# Patient Record
Sex: MALE | Race: WHITE | NOT HISPANIC OR LATINO
[De-identification: names, ages, dates, MRNs, and addresses within clinical notes are randomized per-mention and may not be internally consistent; named-entity substitution may affect disease eponyms.]

---

## 2017-01-31 ENCOUNTER — MEDICATION RENEWAL (OUTPATIENT)
Age: 82
End: 2017-01-31

## 2017-03-27 ENCOUNTER — INPATIENT (INPATIENT)
Facility: HOSPITAL | Age: 82
LOS: 2 days | Discharge: HOME | End: 2017-03-30

## 2017-03-31 ENCOUNTER — APPOINTMENT (OUTPATIENT)
Dept: CARDIOLOGY | Facility: CLINIC | Age: 82
End: 2017-03-31

## 2017-04-03 ENCOUNTER — APPOINTMENT (OUTPATIENT)
Dept: CARDIOLOGY | Facility: CLINIC | Age: 82
End: 2017-04-03

## 2017-04-03 VITALS — RESPIRATION RATE: 18 BRPM | HEART RATE: 56 BPM | SYSTOLIC BLOOD PRESSURE: 110 MMHG | DIASTOLIC BLOOD PRESSURE: 80 MMHG

## 2017-04-03 VITALS — HEIGHT: 67 IN | BODY MASS INDEX: 35 KG/M2 | WEIGHT: 223 LBS

## 2017-04-03 RX ORDER — BLOOD SUGAR DIAGNOSTIC
STRIP MISCELLANEOUS
Qty: 200 | Refills: 0 | Status: ACTIVE | COMMUNITY
Start: 2016-05-16

## 2017-04-03 RX ORDER — OXYBUTYNIN CHLORIDE 10 MG/1
10 TABLET, EXTENDED RELEASE ORAL
Qty: 30 | Refills: 0 | Status: ACTIVE | COMMUNITY
Start: 2016-12-27

## 2017-04-03 RX ORDER — PIOGLITAZONE HYDROCHLORIDE 45 MG/1
45 TABLET ORAL
Qty: 30 | Refills: 0 | Status: ACTIVE | COMMUNITY
Start: 2016-12-27

## 2017-04-03 RX ORDER — OMEPRAZOLE 20 MG/1
20 CAPSULE, DELAYED RELEASE ORAL
Qty: 180 | Refills: 0 | Status: ACTIVE | COMMUNITY
Start: 2016-12-05

## 2017-04-03 RX ORDER — GLIPIZIDE 10 MG/1
10 TABLET ORAL
Qty: 360 | Refills: 0 | Status: ACTIVE | COMMUNITY
Start: 2016-10-17

## 2017-04-03 RX ORDER — MEMANTINE HYDROCHLORIDE 10 MG/1
10 TABLET, FILM COATED ORAL
Refills: 0 | Status: ACTIVE | COMMUNITY

## 2017-06-27 DIAGNOSIS — I95.9 HYPOTENSION, UNSPECIFIED: ICD-10-CM

## 2017-06-27 DIAGNOSIS — B95.61 METHICILLIN SUSCEPTIBLE STAPHYLOCOCCUS AUREUS INFECTION AS THE CAUSE OF DISEASES CLASSIFIED ELSEWHERE: ICD-10-CM

## 2017-06-27 DIAGNOSIS — B96.5 PSEUDOMONAS (AERUGINOSA) (MALLEI) (PSEUDOMALLEI) AS THE CAUSE OF DISEASES CLASSIFIED ELSEWHERE: ICD-10-CM

## 2017-06-27 DIAGNOSIS — N39.0 URINARY TRACT INFECTION, SITE NOT SPECIFIED: ICD-10-CM

## 2017-06-27 DIAGNOSIS — Z95.5 PRESENCE OF CORONARY ANGIOPLASTY IMPLANT AND GRAFT: ICD-10-CM

## 2017-06-27 DIAGNOSIS — I25.10 ATHEROSCLEROTIC HEART DISEASE OF NATIVE CORONARY ARTERY WITHOUT ANGINA PECTORIS: ICD-10-CM

## 2017-06-27 DIAGNOSIS — I48.92 UNSPECIFIED ATRIAL FLUTTER: ICD-10-CM

## 2017-06-27 DIAGNOSIS — A41.9 SEPSIS, UNSPECIFIED ORGANISM: ICD-10-CM

## 2017-06-27 DIAGNOSIS — E53.9 VITAMIN B DEFICIENCY, UNSPECIFIED: ICD-10-CM

## 2017-06-27 DIAGNOSIS — I10 ESSENTIAL (PRIMARY) HYPERTENSION: ICD-10-CM

## 2017-06-27 DIAGNOSIS — F03.90 UNSPECIFIED DEMENTIA, UNSPECIFIED SEVERITY, WITHOUT BEHAVIORAL DISTURBANCE, PSYCHOTIC DISTURBANCE, MOOD DISTURBANCE, AND ANXIETY: ICD-10-CM

## 2017-06-27 DIAGNOSIS — E78.5 HYPERLIPIDEMIA, UNSPECIFIED: ICD-10-CM

## 2017-06-27 DIAGNOSIS — I48.91 UNSPECIFIED ATRIAL FIBRILLATION: ICD-10-CM

## 2017-06-27 DIAGNOSIS — E11.9 TYPE 2 DIABETES MELLITUS WITHOUT COMPLICATIONS: ICD-10-CM

## 2017-06-27 DIAGNOSIS — N40.0 BENIGN PROSTATIC HYPERPLASIA WITHOUT LOWER URINARY TRACT SYMPTOMS: ICD-10-CM

## 2017-06-27 DIAGNOSIS — R65.21 SEVERE SEPSIS WITH SEPTIC SHOCK: ICD-10-CM

## 2017-07-08 ENCOUNTER — EMERGENCY (EMERGENCY)
Facility: HOSPITAL | Age: 82
LOS: 0 days | Discharge: HOME | End: 2017-07-08
Admitting: INTERNAL MEDICINE

## 2017-07-08 DIAGNOSIS — Z79.899 OTHER LONG TERM (CURRENT) DRUG THERAPY: ICD-10-CM

## 2017-07-08 DIAGNOSIS — N50.1 VASCULAR DISORDERS OF MALE GENITAL ORGANS: ICD-10-CM

## 2017-07-08 DIAGNOSIS — E11.9 TYPE 2 DIABETES MELLITUS WITHOUT COMPLICATIONS: ICD-10-CM

## 2017-07-08 DIAGNOSIS — Z88.8 ALLERGY STATUS TO OTHER DRUGS, MEDICAMENTS AND BIOLOGICAL SUBSTANCES STATUS: ICD-10-CM

## 2017-07-08 DIAGNOSIS — Z79.84 LONG TERM (CURRENT) USE OF ORAL HYPOGLYCEMIC DRUGS: ICD-10-CM

## 2017-07-08 DIAGNOSIS — E78.00 PURE HYPERCHOLESTEROLEMIA, UNSPECIFIED: ICD-10-CM

## 2017-07-08 DIAGNOSIS — Z79.82 LONG TERM (CURRENT) USE OF ASPIRIN: ICD-10-CM

## 2017-07-08 DIAGNOSIS — N39.0 URINARY TRACT INFECTION, SITE NOT SPECIFIED: ICD-10-CM

## 2017-07-08 DIAGNOSIS — I48.91 UNSPECIFIED ATRIAL FIBRILLATION: ICD-10-CM

## 2017-07-08 DIAGNOSIS — I10 ESSENTIAL (PRIMARY) HYPERTENSION: ICD-10-CM

## 2017-08-17 ENCOUNTER — EMERGENCY (EMERGENCY)
Facility: HOSPITAL | Age: 82
LOS: 0 days | Discharge: HOME | End: 2017-08-17

## 2017-08-17 DIAGNOSIS — N39.0 URINARY TRACT INFECTION, SITE NOT SPECIFIED: ICD-10-CM

## 2017-08-17 DIAGNOSIS — Z79.84 LONG TERM (CURRENT) USE OF ORAL HYPOGLYCEMIC DRUGS: ICD-10-CM

## 2017-08-17 DIAGNOSIS — E78.00 PURE HYPERCHOLESTEROLEMIA, UNSPECIFIED: ICD-10-CM

## 2017-08-17 DIAGNOSIS — Z79.82 LONG TERM (CURRENT) USE OF ASPIRIN: ICD-10-CM

## 2017-08-17 DIAGNOSIS — Z88.8 ALLERGY STATUS TO OTHER DRUGS, MEDICAMENTS AND BIOLOGICAL SUBSTANCES STATUS: ICD-10-CM

## 2017-08-17 DIAGNOSIS — E11.9 TYPE 2 DIABETES MELLITUS WITHOUT COMPLICATIONS: ICD-10-CM

## 2017-08-17 DIAGNOSIS — I10 ESSENTIAL (PRIMARY) HYPERTENSION: ICD-10-CM

## 2017-08-17 DIAGNOSIS — Z79.899 OTHER LONG TERM (CURRENT) DRUG THERAPY: ICD-10-CM

## 2017-08-17 DIAGNOSIS — I48.91 UNSPECIFIED ATRIAL FIBRILLATION: ICD-10-CM

## 2017-08-17 DIAGNOSIS — R41.0 DISORIENTATION, UNSPECIFIED: ICD-10-CM

## 2017-08-28 ENCOUNTER — APPOINTMENT (OUTPATIENT)
Dept: CARDIOLOGY | Facility: CLINIC | Age: 82
End: 2017-08-28

## 2017-08-28 VITALS — WEIGHT: 218 LBS | HEIGHT: 67 IN | BODY MASS INDEX: 34.21 KG/M2

## 2017-08-28 VITALS — HEART RATE: 56 BPM | SYSTOLIC BLOOD PRESSURE: 120 MMHG | RESPIRATION RATE: 18 BRPM | DIASTOLIC BLOOD PRESSURE: 70 MMHG

## 2017-08-28 DIAGNOSIS — Z86.39 PERSONAL HISTORY OF OTHER ENDOCRINE, NUTRITIONAL AND METABOLIC DISEASE: ICD-10-CM

## 2017-08-28 DIAGNOSIS — R60.0 LOCALIZED EDEMA: ICD-10-CM

## 2017-08-28 DIAGNOSIS — Z95.1 PRESENCE OF AORTOCORONARY BYPASS GRAFT: ICD-10-CM

## 2017-08-28 DIAGNOSIS — I48.0 PAROXYSMAL ATRIAL FIBRILLATION: ICD-10-CM

## 2017-08-28 DIAGNOSIS — I70.90 UNSPECIFIED ATHEROSCLEROSIS: ICD-10-CM

## 2017-08-28 DIAGNOSIS — F03.90 UNSPECIFIED DEMENTIA W/OUT BEHAVIORAL DISTURBANCE: ICD-10-CM

## 2017-08-28 RX ORDER — LEVOFLOXACIN 750 MG/1
750 TABLET, FILM COATED ORAL
Qty: 5 | Refills: 0 | Status: DISCONTINUED | COMMUNITY
Start: 2017-03-30 | End: 2017-08-28

## 2018-01-19 ENCOUNTER — EMERGENCY (EMERGENCY)
Facility: HOSPITAL | Age: 83
LOS: 0 days | Discharge: HOME | End: 2018-01-19
Admitting: INTERNAL MEDICINE

## 2018-01-19 DIAGNOSIS — I48.91 UNSPECIFIED ATRIAL FIBRILLATION: ICD-10-CM

## 2018-01-19 DIAGNOSIS — Z79.82 LONG TERM (CURRENT) USE OF ASPIRIN: ICD-10-CM

## 2018-01-19 DIAGNOSIS — R55 SYNCOPE AND COLLAPSE: ICD-10-CM

## 2018-01-19 DIAGNOSIS — N39.0 URINARY TRACT INFECTION, SITE NOT SPECIFIED: ICD-10-CM

## 2018-01-19 DIAGNOSIS — R60.0 LOCALIZED EDEMA: ICD-10-CM

## 2018-01-19 DIAGNOSIS — I10 ESSENTIAL (PRIMARY) HYPERTENSION: ICD-10-CM

## 2018-01-19 DIAGNOSIS — E11.9 TYPE 2 DIABETES MELLITUS WITHOUT COMPLICATIONS: ICD-10-CM

## 2018-01-19 DIAGNOSIS — R53.1 WEAKNESS: ICD-10-CM

## 2018-01-19 DIAGNOSIS — Z79.84 LONG TERM (CURRENT) USE OF ORAL HYPOGLYCEMIC DRUGS: ICD-10-CM

## 2018-01-19 DIAGNOSIS — E78.00 PURE HYPERCHOLESTEROLEMIA, UNSPECIFIED: ICD-10-CM

## 2018-01-19 DIAGNOSIS — Z88.8 ALLERGY STATUS TO OTHER DRUGS, MEDICAMENTS AND BIOLOGICAL SUBSTANCES STATUS: ICD-10-CM

## 2018-01-19 DIAGNOSIS — Z79.899 OTHER LONG TERM (CURRENT) DRUG THERAPY: ICD-10-CM

## 2018-01-30 ENCOUNTER — EMERGENCY (EMERGENCY)
Facility: HOSPITAL | Age: 83
LOS: 0 days | Discharge: HOME | End: 2018-01-30

## 2018-01-30 DIAGNOSIS — I10 ESSENTIAL (PRIMARY) HYPERTENSION: ICD-10-CM

## 2018-01-30 DIAGNOSIS — Z79.899 OTHER LONG TERM (CURRENT) DRUG THERAPY: ICD-10-CM

## 2018-01-30 DIAGNOSIS — N39.0 URINARY TRACT INFECTION, SITE NOT SPECIFIED: ICD-10-CM

## 2018-01-30 DIAGNOSIS — E11.9 TYPE 2 DIABETES MELLITUS WITHOUT COMPLICATIONS: ICD-10-CM

## 2018-01-30 DIAGNOSIS — Z88.8 ALLERGY STATUS TO OTHER DRUGS, MEDICAMENTS AND BIOLOGICAL SUBSTANCES STATUS: ICD-10-CM

## 2018-01-30 DIAGNOSIS — R55 SYNCOPE AND COLLAPSE: ICD-10-CM

## 2018-01-30 DIAGNOSIS — Z79.82 LONG TERM (CURRENT) USE OF ASPIRIN: ICD-10-CM

## 2018-01-30 DIAGNOSIS — I48.91 UNSPECIFIED ATRIAL FIBRILLATION: ICD-10-CM

## 2018-01-30 DIAGNOSIS — E78.00 PURE HYPERCHOLESTEROLEMIA, UNSPECIFIED: ICD-10-CM

## 2018-01-30 DIAGNOSIS — E78.5 HYPERLIPIDEMIA, UNSPECIFIED: ICD-10-CM

## 2018-02-14 ENCOUNTER — MEDICATION RENEWAL (OUTPATIENT)
Age: 83
End: 2018-02-14

## 2018-02-28 ENCOUNTER — APPOINTMENT (OUTPATIENT)
Dept: CARDIOLOGY | Facility: CLINIC | Age: 83
End: 2018-02-28

## 2018-02-28 VITALS — RESPIRATION RATE: 18 BRPM | HEART RATE: 56 BPM | DIASTOLIC BLOOD PRESSURE: 80 MMHG | SYSTOLIC BLOOD PRESSURE: 120 MMHG

## 2018-02-28 VITALS — BODY MASS INDEX: 34.37 KG/M2 | WEIGHT: 219 LBS | HEIGHT: 67 IN

## 2018-02-28 RX ORDER — FUROSEMIDE 20 MG/1
20 TABLET ORAL
Qty: 7 | Refills: 3 | Status: DISCONTINUED | COMMUNITY
Start: 2017-04-03 | End: 2018-02-28

## 2018-03-14 ENCOUNTER — EMERGENCY (EMERGENCY)
Facility: HOSPITAL | Age: 83
LOS: 0 days | Discharge: HOME | End: 2018-03-14
Attending: EMERGENCY MEDICINE

## 2018-03-14 VITALS
SYSTOLIC BLOOD PRESSURE: 166 MMHG | DIASTOLIC BLOOD PRESSURE: 54 MMHG | TEMPERATURE: 97 F | RESPIRATION RATE: 18 BRPM | HEART RATE: 51 BPM | OXYGEN SATURATION: 97 %

## 2018-03-14 VITALS
RESPIRATION RATE: 18 BRPM | DIASTOLIC BLOOD PRESSURE: 83 MMHG | OXYGEN SATURATION: 98 % | SYSTOLIC BLOOD PRESSURE: 150 MMHG | TEMPERATURE: 99 F | HEART RATE: 51 BPM

## 2018-03-14 DIAGNOSIS — K59.00 CONSTIPATION, UNSPECIFIED: ICD-10-CM

## 2018-03-14 DIAGNOSIS — E78.00 PURE HYPERCHOLESTEROLEMIA, UNSPECIFIED: ICD-10-CM

## 2018-03-14 DIAGNOSIS — K62.89 OTHER SPECIFIED DISEASES OF ANUS AND RECTUM: ICD-10-CM

## 2018-03-14 DIAGNOSIS — I10 ESSENTIAL (PRIMARY) HYPERTENSION: ICD-10-CM

## 2018-03-14 DIAGNOSIS — R39.198 OTHER DIFFICULTIES WITH MICTURITION: ICD-10-CM

## 2018-03-14 DIAGNOSIS — Z88.8 ALLERGY STATUS TO OTHER DRUGS, MEDICAMENTS AND BIOLOGICAL SUBSTANCES: ICD-10-CM

## 2018-03-14 DIAGNOSIS — E11.9 TYPE 2 DIABETES MELLITUS WITHOUT COMPLICATIONS: ICD-10-CM

## 2018-03-14 DIAGNOSIS — Z79.899 OTHER LONG TERM (CURRENT) DRUG THERAPY: ICD-10-CM

## 2018-03-14 LAB
ALBUMIN SERPL ELPH-MCNC: 4.2 G/DL — SIGNIFICANT CHANGE UP (ref 3.5–5.2)
ALP SERPL-CCNC: 69 U/L — SIGNIFICANT CHANGE UP (ref 30–115)
ALT FLD-CCNC: 10 U/L — SIGNIFICANT CHANGE UP (ref 0–41)
ANION GAP SERPL CALC-SCNC: 11 MMOL/L — SIGNIFICANT CHANGE UP (ref 7–14)
APPEARANCE UR: CLEAR — SIGNIFICANT CHANGE UP
AST SERPL-CCNC: 15 U/L — SIGNIFICANT CHANGE UP (ref 0–41)
BACTERIA # UR AUTO: (no result)
BASOPHILS # BLD AUTO: 0.03 K/UL — SIGNIFICANT CHANGE UP (ref 0–0.2)
BASOPHILS NFR BLD AUTO: 0.7 % — SIGNIFICANT CHANGE UP (ref 0–1)
BILIRUB SERPL-MCNC: 0.4 MG/DL — SIGNIFICANT CHANGE UP (ref 0.2–1.2)
BILIRUB UR-MCNC: NEGATIVE — SIGNIFICANT CHANGE UP
BUN SERPL-MCNC: 20 MG/DL — SIGNIFICANT CHANGE UP (ref 10–20)
CALCIUM SERPL-MCNC: 9.1 MG/DL — SIGNIFICANT CHANGE UP (ref 8.5–10.1)
CHLORIDE SERPL-SCNC: 100 MMOL/L — SIGNIFICANT CHANGE UP (ref 98–110)
CO2 SERPL-SCNC: 28 MMOL/L — SIGNIFICANT CHANGE UP (ref 17–32)
COLOR SPEC: YELLOW — SIGNIFICANT CHANGE UP
CREAT SERPL-MCNC: 1.1 MG/DL — SIGNIFICANT CHANGE UP (ref 0.7–1.5)
DIFF PNL FLD: NEGATIVE — SIGNIFICANT CHANGE UP
EOSINOPHIL # BLD AUTO: 0.02 K/UL — SIGNIFICANT CHANGE UP (ref 0–0.7)
EOSINOPHIL NFR BLD AUTO: 0.4 % — SIGNIFICANT CHANGE UP (ref 0–8)
EPI CELLS # UR: (no result) /HPF
GLUCOSE SERPL-MCNC: 138 MG/DL — HIGH (ref 70–110)
GLUCOSE UR QL: 100 MG/DL
HCT VFR BLD CALC: 36.2 % — LOW (ref 42–52)
HGB BLD-MCNC: 11.7 G/DL — LOW (ref 14–18)
IMM GRANULOCYTES NFR BLD AUTO: 0.2 % — SIGNIFICANT CHANGE UP (ref 0.1–0.3)
KETONES UR-MCNC: NEGATIVE — SIGNIFICANT CHANGE UP
LACTATE SERPL-SCNC: 1.4 MMOL/L — SIGNIFICANT CHANGE UP (ref 0.5–2.2)
LEUKOCYTE ESTERASE UR-ACNC: (no result)
LIDOCAIN IGE QN: 14 U/L — SIGNIFICANT CHANGE UP (ref 7–60)
LYMPHOCYTES # BLD AUTO: 0.82 K/UL — LOW (ref 1.2–3.4)
LYMPHOCYTES # BLD AUTO: 18 % — LOW (ref 20.5–51.1)
MCHC RBC-ENTMCNC: 28.5 PG — SIGNIFICANT CHANGE UP (ref 27–31)
MCHC RBC-ENTMCNC: 32.3 G/DL — SIGNIFICANT CHANGE UP (ref 32–37)
MCV RBC AUTO: 88.1 FL — SIGNIFICANT CHANGE UP (ref 80–94)
MONOCYTES # BLD AUTO: 0.29 K/UL — SIGNIFICANT CHANGE UP (ref 0.1–0.6)
MONOCYTES NFR BLD AUTO: 6.4 % — SIGNIFICANT CHANGE UP (ref 1.7–9.3)
NEUTROPHILS # BLD AUTO: 3.38 K/UL — SIGNIFICANT CHANGE UP (ref 1.4–6.5)
NEUTROPHILS NFR BLD AUTO: 74.3 % — SIGNIFICANT CHANGE UP (ref 42.2–75.2)
NITRITE UR-MCNC: NEGATIVE — SIGNIFICANT CHANGE UP
NRBC # BLD: 0 /100 WBCS — SIGNIFICANT CHANGE UP (ref 0–0)
PH UR: 6 — SIGNIFICANT CHANGE UP (ref 5–8)
PLATELET # BLD AUTO: 190 K/UL — SIGNIFICANT CHANGE UP (ref 130–400)
POTASSIUM SERPL-MCNC: 4.1 MMOL/L — SIGNIFICANT CHANGE UP (ref 3.5–5)
POTASSIUM SERPL-SCNC: 4.1 MMOL/L — SIGNIFICANT CHANGE UP (ref 3.5–5)
PROT SERPL-MCNC: 6 G/DL — SIGNIFICANT CHANGE UP (ref 6–8)
PROT UR-MCNC: NEGATIVE MG/DL — SIGNIFICANT CHANGE UP
RBC # BLD: 4.11 M/UL — LOW (ref 4.7–6.1)
RBC # FLD: 13 % — SIGNIFICANT CHANGE UP (ref 11.5–14.5)
RBC CASTS # UR COMP ASSIST: SIGNIFICANT CHANGE UP /HPF
SODIUM SERPL-SCNC: 139 MMOL/L — SIGNIFICANT CHANGE UP (ref 135–146)
SP GR SPEC: 1.02 — SIGNIFICANT CHANGE UP (ref 1.01–1.03)
UROBILINOGEN FLD QL: 0.2 MG/DL — SIGNIFICANT CHANGE UP (ref 0.2–0.2)
WBC # BLD: 4.55 K/UL — LOW (ref 4.8–10.8)
WBC # FLD AUTO: 4.55 K/UL — LOW (ref 4.8–10.8)
WBC UR QL: (no result) /HPF

## 2018-03-14 RX ORDER — DIATRIZOATE MEGLUMINE 180 MG/ML
30 INJECTION, SOLUTION INTRAVESICAL ONCE
Qty: 0 | Refills: 0 | Status: COMPLETED | OUTPATIENT
Start: 2018-03-14 | End: 2018-03-14

## 2018-03-14 RX ORDER — SODIUM CHLORIDE 9 MG/ML
1000 INJECTION INTRAMUSCULAR; INTRAVENOUS; SUBCUTANEOUS
Qty: 0 | Refills: 0 | Status: DISCONTINUED | OUTPATIENT
Start: 2018-03-14 | End: 2018-03-14

## 2018-03-14 RX ADMIN — DIATRIZOATE MEGLUMINE 30 MILLILITER(S): 180 INJECTION, SOLUTION INTRAVESICAL at 12:20

## 2018-03-14 RX ADMIN — SODIUM CHLORIDE 125 MILLILITER(S): 9 INJECTION INTRAMUSCULAR; INTRAVENOUS; SUBCUTANEOUS at 12:11

## 2018-03-14 NOTE — ED ADULT TRIAGE NOTE - CHIEF COMPLAINT QUOTE
patient's daughter states that yesterday he was complaining of rectal pain, and patient's daughter believes he may be rectally impacted.

## 2018-03-14 NOTE — ED PROVIDER NOTE - MEDICAL DECISION MAKING DETAILS
I have personally performed a history and physical exam on this patient and personally directed the management of the patient. Patient presents with complaints of rectal pain it is mild, cramping in nature, improves with passing gas, he deniesa ny dysuria, hesitancy or frequency.  He denies any vomiting, he denies any abdominal pain or chest pain.  He denies any fevers.  On physical exam the patient is nc/at perrla eomi oropharynx clear cta b/l, rrr s1s2 noted abd-soft nt nd bs + ext from with no edema  rectal per pa.  based on his age symptoms and history we obtained labs as well as ct of the abdomen.  He improved here in the Ed I will discharge at this time.  we discussed indications to return at this time

## 2018-03-28 ENCOUNTER — EMERGENCY (EMERGENCY)
Facility: HOSPITAL | Age: 83
LOS: 0 days | Discharge: HOME | End: 2018-03-28
Attending: EMERGENCY MEDICINE

## 2018-03-28 VITALS
DIASTOLIC BLOOD PRESSURE: 66 MMHG | SYSTOLIC BLOOD PRESSURE: 149 MMHG | RESPIRATION RATE: 18 BRPM | HEART RATE: 54 BPM | TEMPERATURE: 98 F | OXYGEN SATURATION: 98 %

## 2018-03-28 VITALS
HEART RATE: 54 BPM | RESPIRATION RATE: 19 BRPM | SYSTOLIC BLOOD PRESSURE: 149 MMHG | HEIGHT: 72 IN | DIASTOLIC BLOOD PRESSURE: 66 MMHG | OXYGEN SATURATION: 98 % | WEIGHT: 240.08 LBS | TEMPERATURE: 97 F

## 2018-03-28 DIAGNOSIS — I10 ESSENTIAL (PRIMARY) HYPERTENSION: ICD-10-CM

## 2018-03-28 DIAGNOSIS — K62.89 OTHER SPECIFIED DISEASES OF ANUS AND RECTUM: ICD-10-CM

## 2018-03-28 DIAGNOSIS — Z79.84 LONG TERM (CURRENT) USE OF ORAL HYPOGLYCEMIC DRUGS: ICD-10-CM

## 2018-03-28 DIAGNOSIS — E78.00 PURE HYPERCHOLESTEROLEMIA, UNSPECIFIED: ICD-10-CM

## 2018-03-28 DIAGNOSIS — Z88.8 ALLERGY STATUS TO OTHER DRUGS, MEDICAMENTS AND BIOLOGICAL SUBSTANCES STATUS: ICD-10-CM

## 2018-03-28 DIAGNOSIS — R10.9 UNSPECIFIED ABDOMINAL PAIN: ICD-10-CM

## 2018-03-28 DIAGNOSIS — Z79.899 OTHER LONG TERM (CURRENT) DRUG THERAPY: ICD-10-CM

## 2018-03-28 DIAGNOSIS — E11.9 TYPE 2 DIABETES MELLITUS WITHOUT COMPLICATIONS: ICD-10-CM

## 2018-03-28 LAB
ALBUMIN SERPL ELPH-MCNC: 3.9 G/DL — SIGNIFICANT CHANGE UP (ref 3.5–5.2)
ALP SERPL-CCNC: 56 U/L — SIGNIFICANT CHANGE UP (ref 30–115)
ALT FLD-CCNC: 11 U/L — SIGNIFICANT CHANGE UP (ref 0–41)
ANION GAP SERPL CALC-SCNC: 16 MMOL/L — HIGH (ref 7–14)
APPEARANCE UR: CLEAR — SIGNIFICANT CHANGE UP
AST SERPL-CCNC: 45 U/L — HIGH (ref 0–41)
BACTERIA # UR AUTO: NEGATIVE — SIGNIFICANT CHANGE UP
BASE EXCESS BLDV CALC-SCNC: 1.3 MMOL/L — SIGNIFICANT CHANGE UP (ref -2–2)
BASOPHILS # BLD AUTO: 0.03 K/UL — SIGNIFICANT CHANGE UP (ref 0–0.2)
BASOPHILS NFR BLD AUTO: 0.5 % — SIGNIFICANT CHANGE UP (ref 0–1)
BILIRUB SERPL-MCNC: 0.4 MG/DL — SIGNIFICANT CHANGE UP (ref 0.2–1.2)
BILIRUB UR-MCNC: NEGATIVE — SIGNIFICANT CHANGE UP
BUN SERPL-MCNC: 22 MG/DL — HIGH (ref 10–20)
CA-I SERPL-SCNC: 1.22 MMOL/L — SIGNIFICANT CHANGE UP (ref 1.12–1.3)
CALCIUM SERPL-MCNC: 9.2 MG/DL — SIGNIFICANT CHANGE UP (ref 8.5–10.1)
CHLORIDE SERPL-SCNC: 103 MMOL/L — SIGNIFICANT CHANGE UP (ref 98–110)
CO2 SERPL-SCNC: 24 MMOL/L — SIGNIFICANT CHANGE UP (ref 17–32)
COD CRY URNS QL: NEGATIVE — SIGNIFICANT CHANGE UP
COLOR SPEC: YELLOW — SIGNIFICANT CHANGE UP
CREAT SERPL-MCNC: 1.2 MG/DL — SIGNIFICANT CHANGE UP (ref 0.7–1.5)
DIFF PNL FLD: NEGATIVE — SIGNIFICANT CHANGE UP
EOSINOPHIL # BLD AUTO: 0.04 K/UL — SIGNIFICANT CHANGE UP (ref 0–0.7)
EOSINOPHIL NFR BLD AUTO: 0.7 % — SIGNIFICANT CHANGE UP (ref 0–8)
EPI CELLS # UR: NEGATIVE — SIGNIFICANT CHANGE UP
GAS PNL BLDV: 139 MMOL/L — SIGNIFICANT CHANGE UP (ref 136–145)
GAS PNL BLDV: SIGNIFICANT CHANGE UP
GLUCOSE SERPL-MCNC: 182 MG/DL — HIGH (ref 70–99)
GLUCOSE UR QL: NEGATIVE MG/DL — SIGNIFICANT CHANGE UP
HCO3 BLDV-SCNC: 27 MMOL/L — SIGNIFICANT CHANGE UP (ref 22–29)
HCT VFR BLD CALC: 37.4 % — LOW (ref 42–52)
HCT VFR BLDA CALC: 37.1 % — SIGNIFICANT CHANGE UP (ref 34–44)
HGB BLD CALC-MCNC: 12.1 G/DL — LOW (ref 14–18)
HGB BLD-MCNC: 11.8 G/DL — LOW (ref 14–18)
HOROWITZ INDEX BLDV+IHG-RTO: 21 — SIGNIFICANT CHANGE UP
IMM GRANULOCYTES NFR BLD AUTO: 0.2 % — SIGNIFICANT CHANGE UP (ref 0.1–0.3)
KETONES UR-MCNC: NEGATIVE — SIGNIFICANT CHANGE UP
LACTATE BLDV-MCNC: 1.9 MMOL/L — HIGH (ref 0.5–1.6)
LEUKOCYTE ESTERASE UR-ACNC: NEGATIVE — SIGNIFICANT CHANGE UP
LIDOCAIN IGE QN: 20 U/L — SIGNIFICANT CHANGE UP (ref 7–60)
LYMPHOCYTES # BLD AUTO: 1.01 K/UL — LOW (ref 1.2–3.4)
LYMPHOCYTES # BLD AUTO: 18.5 % — LOW (ref 20.5–51.1)
MCHC RBC-ENTMCNC: 28.3 PG — SIGNIFICANT CHANGE UP (ref 27–31)
MCHC RBC-ENTMCNC: 31.6 G/DL — LOW (ref 32–37)
MCV RBC AUTO: 89.7 FL — SIGNIFICANT CHANGE UP (ref 80–94)
MONOCYTES # BLD AUTO: 0.35 K/UL — SIGNIFICANT CHANGE UP (ref 0.1–0.6)
MONOCYTES NFR BLD AUTO: 6.4 % — SIGNIFICANT CHANGE UP (ref 1.7–9.3)
NEUTROPHILS # BLD AUTO: 4.03 K/UL — SIGNIFICANT CHANGE UP (ref 1.4–6.5)
NEUTROPHILS NFR BLD AUTO: 73.7 % — SIGNIFICANT CHANGE UP (ref 42.2–75.2)
NITRITE UR-MCNC: NEGATIVE — SIGNIFICANT CHANGE UP
NRBC # BLD: 0 /100 WBCS — SIGNIFICANT CHANGE UP (ref 0–0)
PCO2 BLDV: 48 MMHG — SIGNIFICANT CHANGE UP (ref 41–51)
PH BLDV: 7.36 — SIGNIFICANT CHANGE UP (ref 7.26–7.43)
PH UR: 6 — SIGNIFICANT CHANGE UP (ref 5–8)
PLATELET # BLD AUTO: 194 K/UL — SIGNIFICANT CHANGE UP (ref 130–400)
PO2 BLDV: 28 MMHG — SIGNIFICANT CHANGE UP (ref 20–40)
POTASSIUM BLDV-SCNC: 4.1 MMOL/L — SIGNIFICANT CHANGE UP (ref 3.3–5.6)
POTASSIUM SERPL-MCNC: 6.5 MMOL/L — CRITICAL HIGH (ref 3.5–5)
POTASSIUM SERPL-SCNC: 6.5 MMOL/L — CRITICAL HIGH (ref 3.5–5)
PROT SERPL-MCNC: 6.3 G/DL — SIGNIFICANT CHANGE UP (ref 6–8)
PROT UR-MCNC: 30 MG/DL
RBC # BLD: 4.17 M/UL — LOW (ref 4.7–6.1)
RBC # FLD: 13.2 % — SIGNIFICANT CHANGE UP (ref 11.5–14.5)
RBC CASTS # UR COMP ASSIST: NEGATIVE — SIGNIFICANT CHANGE UP
SAO2 % BLDV: 46 % — SIGNIFICANT CHANGE UP
SODIUM SERPL-SCNC: 143 MMOL/L — SIGNIFICANT CHANGE UP (ref 135–146)
SP GR SPEC: >=1.03 (ref 1.01–1.03)
TRI-PHOS CRY UR QL COMP ASSIST: NEGATIVE — SIGNIFICANT CHANGE UP
URATE CRY FLD QL MICRO: NEGATIVE — SIGNIFICANT CHANGE UP
UROBILINOGEN FLD QL: 0.2 MG/DL — SIGNIFICANT CHANGE UP (ref 0.2–0.2)
WBC # BLD: 5.47 K/UL — SIGNIFICANT CHANGE UP (ref 4.8–10.8)
WBC # FLD AUTO: 5.47 K/UL — SIGNIFICANT CHANGE UP (ref 4.8–10.8)
WBC UR QL: SIGNIFICANT CHANGE UP /HPF

## 2018-03-28 NOTE — ED PROVIDER NOTE - OBJECTIVE STATEMENT
this is 86 yo male presents to ed for evaluation.  patient states he had episode of rectal pain lasting about 2 min. patient denies any pain now. Daugter sent him to ed for eval. patient was seen in ed last week for same. xenia had full worksup and everything was negative

## 2018-03-28 NOTE — ED PROVIDER NOTE - NS ED ROS FT
Review of Systems:  	•	CONSTITUTIONAL - no fever, no diaphoresis, no chills  	•	SKIN - no rash  	•	HEMATOLOGIC - no bleeding, no bruising  	•	EYES - no eye pain, no blurry vision  	•	ENT - no change in hearing, no sore throat, no ear pain or tinnitus  	•	RESPIRATORY - no shortness of breath, no cough  	•	CARDIAC - no chest pain, no palpitations  	•	GI - no abd pain, no nausea, no vomiting, no diarrhea, no constipation  	    	•	NEUROLOGIC - no weakness, no headache, no paresthesias, no LOC  	•	PSYCH - no anxiety, non suicidal, non homicidal, no hallucination, no depression

## 2018-03-28 NOTE — ED PROVIDER NOTE - PROGRESS NOTE DETAILS
Patient to be discharged from ED. Any available test results were discussed with patient and/or family. Verbal instructions given, including instructions to return to ED immediately for any new, worsening, or concerning symptoms. Patient endorsed understanding. Written discharge instructions additionally given, including follow-up plan.

## 2018-03-28 NOTE — ED PROVIDER NOTE - PHYSICAL EXAMINATION
--EXAM--  VITAL SIGNS: I have reviewed vs documented at present.  CONSTITUTIONAL: Well-developed; well-nourished; in no acute distress.   SKIN: Warm and dry, no acute rash.   HEAD: Normocephalic; atraumatic.  EYES: PERRL, EOM intact; conjunctiva and sclera clear. No nystagmus.  ENT: No nasal discharge; airway clear.  NECK: Supple; non tender.  CARD: S1, S2, Regular rate and rhythm.   RESP: No wheezes, rales or rhonchi.  ABD: Normal bowel sounds; soft; non-distended; non-tender. rectal soft stook   EXT: Normal ROM.   NEURO: Alert, oriented, grossly unremarkable. Strength 5/5 in all extremities. Sensation intact throughout.  PSYCH: Cooperative, appropriate.

## 2018-03-28 NOTE — ED PROVIDER NOTE - MEDICAL DECISION MAKING DETAILS
I have personally performed a history and physical exam on this patient and personally directed the management of the patient. patient presents for evaluation of cramping abdominal pain that started approx 2 hours prior to arrival. He was seen a short time ago for similar symtpoms he denies any dysuria hestiancy or frequency he denies any back pain  On physical exam he is nc/at perrla eomi oropharynx clear cta b/l, rrr s1s2 noted abd-soft nt nd bs = no guarding no rebound ext from   A/p - I reviewed old chart including lasbs and ct patient is asymptomatic here in the Ed he had a normal bm in addition has a normal exam with normal vital signs he was able to eat an entire dinner tray I will discharge at this time

## 2018-04-18 ENCOUNTER — INPATIENT (INPATIENT)
Facility: HOSPITAL | Age: 83
LOS: 0 days | Discharge: HOME | End: 2018-04-19
Attending: INTERNAL MEDICINE | Admitting: INTERNAL MEDICINE

## 2018-04-18 ENCOUNTER — EMERGENCY (EMERGENCY)
Facility: HOSPITAL | Age: 83
LOS: 0 days | Discharge: HOME | End: 2018-04-18
Admitting: INTERNAL MEDICINE

## 2018-04-18 VITALS — WEIGHT: 210.1 LBS

## 2018-04-18 DIAGNOSIS — Z87.891 PERSONAL HISTORY OF NICOTINE DEPENDENCE: ICD-10-CM

## 2018-04-18 DIAGNOSIS — Z88.8 ALLERGY STATUS TO OTHER DRUGS, MEDICAMENTS AND BIOLOGICAL SUBSTANCES: ICD-10-CM

## 2018-04-18 DIAGNOSIS — E78.00 PURE HYPERCHOLESTEROLEMIA, UNSPECIFIED: ICD-10-CM

## 2018-04-18 DIAGNOSIS — I10 ESSENTIAL (PRIMARY) HYPERTENSION: ICD-10-CM

## 2018-04-18 DIAGNOSIS — E11.9 TYPE 2 DIABETES MELLITUS WITHOUT COMPLICATIONS: ICD-10-CM

## 2018-04-18 DIAGNOSIS — F03.90 UNSPECIFIED DEMENTIA WITHOUT BEHAVIORAL DISTURBANCE: ICD-10-CM

## 2018-04-18 DIAGNOSIS — N32.9 BLADDER DISORDER, UNSPECIFIED: ICD-10-CM

## 2018-04-18 DIAGNOSIS — R53.1 WEAKNESS: ICD-10-CM

## 2018-04-18 DIAGNOSIS — R41.82 ALTERED MENTAL STATUS, UNSPECIFIED: ICD-10-CM

## 2018-04-18 DIAGNOSIS — I48.91 UNSPECIFIED ATRIAL FIBRILLATION: ICD-10-CM

## 2018-04-18 DIAGNOSIS — E78.5 HYPERLIPIDEMIA, UNSPECIFIED: ICD-10-CM

## 2018-04-18 DIAGNOSIS — R09.82 POSTNASAL DRIP: ICD-10-CM

## 2018-04-18 LAB
ALBUMIN SERPL ELPH-MCNC: 3.8 G/DL — SIGNIFICANT CHANGE UP (ref 3.5–5.2)
ALP SERPL-CCNC: 60 U/L — SIGNIFICANT CHANGE UP (ref 30–115)
ALT FLD-CCNC: 9 U/L — SIGNIFICANT CHANGE UP (ref 0–41)
ANION GAP SERPL CALC-SCNC: 13 MMOL/L — SIGNIFICANT CHANGE UP (ref 7–14)
APPEARANCE UR: CLEAR — SIGNIFICANT CHANGE UP
AST SERPL-CCNC: 17 U/L — SIGNIFICANT CHANGE UP (ref 0–41)
BACTERIA # UR AUTO: (no result)
BILIRUB SERPL-MCNC: 1.2 MG/DL — SIGNIFICANT CHANGE UP (ref 0.2–1.2)
BILIRUB UR-MCNC: (no result)
BUN SERPL-MCNC: 21 MG/DL — HIGH (ref 10–20)
CALCIUM SERPL-MCNC: 9.3 MG/DL — SIGNIFICANT CHANGE UP (ref 8.5–10.1)
CHLORIDE SERPL-SCNC: 100 MMOL/L — SIGNIFICANT CHANGE UP (ref 98–110)
CO2 SERPL-SCNC: 25 MMOL/L — SIGNIFICANT CHANGE UP (ref 17–32)
COD CRY URNS QL: NEGATIVE — SIGNIFICANT CHANGE UP
COLOR SPEC: YELLOW — SIGNIFICANT CHANGE UP
CREAT SERPL-MCNC: 1.2 MG/DL — SIGNIFICANT CHANGE UP (ref 0.7–1.5)
DIFF PNL FLD: (no result)
EPI CELLS # UR: (no result) /HPF
GLUCOSE SERPL-MCNC: 150 MG/DL — HIGH (ref 70–99)
GLUCOSE UR QL: NEGATIVE — SIGNIFICANT CHANGE UP
GRAN CASTS # UR COMP ASSIST: (no result) /LPF
HCT VFR BLD CALC: 34.1 % — LOW (ref 42–52)
HGB BLD-MCNC: 10.9 G/DL — LOW (ref 14–18)
HYALINE CASTS # UR AUTO: (no result) /LPF
KETONES UR-MCNC: 40 — SIGNIFICANT CHANGE UP
LACTATE SERPL-SCNC: 1.4 MMOL/L — SIGNIFICANT CHANGE UP (ref 0.5–2.2)
LEUKOCYTE ESTERASE UR-ACNC: NEGATIVE — SIGNIFICANT CHANGE UP
MCHC RBC-ENTMCNC: 27.9 PG — SIGNIFICANT CHANGE UP (ref 27–31)
MCHC RBC-ENTMCNC: 32 G/DL — SIGNIFICANT CHANGE UP (ref 32–37)
MCV RBC AUTO: 87.4 FL — SIGNIFICANT CHANGE UP (ref 80–94)
NITRITE UR-MCNC: NEGATIVE — SIGNIFICANT CHANGE UP
NRBC # BLD: 0 /100 WBCS — SIGNIFICANT CHANGE UP (ref 0–0)
PH UR: 5.5 — SIGNIFICANT CHANGE UP (ref 5–8)
PLATELET # BLD AUTO: 174 K/UL — SIGNIFICANT CHANGE UP (ref 130–400)
POTASSIUM SERPL-MCNC: 4 MMOL/L — SIGNIFICANT CHANGE UP (ref 3.5–5)
POTASSIUM SERPL-SCNC: 4 MMOL/L — SIGNIFICANT CHANGE UP (ref 3.5–5)
PROT SERPL-MCNC: 6.2 G/DL — SIGNIFICANT CHANGE UP (ref 6–8)
PROT UR-MCNC: 100
RBC # BLD: 3.9 M/UL — LOW (ref 4.7–6.1)
RBC # FLD: 13.7 % — SIGNIFICANT CHANGE UP (ref 11.5–14.5)
RBC CASTS # UR COMP ASSIST: (no result) /HPF
SODIUM SERPL-SCNC: 138 MMOL/L — SIGNIFICANT CHANGE UP (ref 135–146)
SP GR SPEC: >=1.03 (ref 1.01–1.03)
TRI-PHOS CRY UR QL COMP ASSIST: NEGATIVE — SIGNIFICANT CHANGE UP
URATE CRY FLD QL MICRO: NEGATIVE — SIGNIFICANT CHANGE UP
UROBILINOGEN FLD QL: 0.2 — SIGNIFICANT CHANGE UP (ref 0.2–0.2)
WBC # BLD: 8.28 K/UL — SIGNIFICANT CHANGE UP (ref 4.8–10.8)
WBC # FLD AUTO: 8.28 K/UL — SIGNIFICANT CHANGE UP (ref 4.8–10.8)
WBC UR QL: SIGNIFICANT CHANGE UP /HPF

## 2018-04-18 RX ORDER — LOSARTAN POTASSIUM 100 MG/1
100 TABLET, FILM COATED ORAL DAILY
Qty: 0 | Refills: 0 | Status: DISCONTINUED | OUTPATIENT
Start: 2018-04-18 | End: 2018-04-19

## 2018-04-18 RX ORDER — SODIUM CHLORIDE 9 MG/ML
1000 INJECTION INTRAMUSCULAR; INTRAVENOUS; SUBCUTANEOUS ONCE
Qty: 0 | Refills: 0 | Status: COMPLETED | OUTPATIENT
Start: 2018-04-18 | End: 2018-04-18

## 2018-04-18 RX ORDER — OXYBUTYNIN CHLORIDE 5 MG
5 TABLET ORAL DAILY
Qty: 0 | Refills: 0 | Status: DISCONTINUED | OUTPATIENT
Start: 2018-04-18 | End: 2018-04-19

## 2018-04-18 RX ORDER — ACETAMINOPHEN 500 MG
325 TABLET ORAL EVERY 4 HOURS
Qty: 0 | Refills: 0 | Status: DISCONTINUED | OUTPATIENT
Start: 2018-04-18 | End: 2018-04-19

## 2018-04-18 RX ORDER — OXYBUTYNIN CHLORIDE 5 MG
10 TABLET ORAL DAILY
Qty: 0 | Refills: 0 | Status: DISCONTINUED | OUTPATIENT
Start: 2018-04-18 | End: 2018-04-18

## 2018-04-18 RX ORDER — HEPARIN SODIUM 5000 [USP'U]/ML
5000 INJECTION INTRAVENOUS; SUBCUTANEOUS EVERY 12 HOURS
Qty: 0 | Refills: 0 | Status: DISCONTINUED | OUTPATIENT
Start: 2018-04-18 | End: 2018-04-19

## 2018-04-18 RX ORDER — PANTOPRAZOLE SODIUM 20 MG/1
40 TABLET, DELAYED RELEASE ORAL
Qty: 0 | Refills: 0 | Status: DISCONTINUED | OUTPATIENT
Start: 2018-04-18 | End: 2018-04-19

## 2018-04-18 RX ORDER — CARVEDILOL PHOSPHATE 80 MG/1
12.5 CAPSULE, EXTENDED RELEASE ORAL EVERY 12 HOURS
Qty: 0 | Refills: 0 | Status: DISCONTINUED | OUTPATIENT
Start: 2018-04-18 | End: 2018-04-19

## 2018-04-18 RX ORDER — DONEPEZIL HYDROCHLORIDE 10 MG/1
10 TABLET, FILM COATED ORAL AT BEDTIME
Qty: 0 | Refills: 0 | Status: DISCONTINUED | OUTPATIENT
Start: 2018-04-18 | End: 2018-04-19

## 2018-04-18 RX ORDER — MEMANTINE HYDROCHLORIDE 10 MG/1
10 TABLET ORAL
Qty: 0 | Refills: 0 | Status: DISCONTINUED | OUTPATIENT
Start: 2018-04-18 | End: 2018-04-19

## 2018-04-18 RX ORDER — CEFTRIAXONE 500 MG/1
1 INJECTION, POWDER, FOR SOLUTION INTRAMUSCULAR; INTRAVENOUS ONCE
Qty: 0 | Refills: 0 | Status: COMPLETED | OUTPATIENT
Start: 2018-04-18 | End: 2018-04-18

## 2018-04-18 RX ORDER — COLESEVELAM HYDROCHLORIDE 625 MG/1
3 TABLET, FILM COATED ORAL
Qty: 0 | Refills: 0 | COMMUNITY

## 2018-04-18 RX ADMIN — CEFTRIAXONE 100 GRAM(S): 500 INJECTION, POWDER, FOR SOLUTION INTRAMUSCULAR; INTRAVENOUS at 18:05

## 2018-04-18 RX ADMIN — SODIUM CHLORIDE 1000 MILLILITER(S): 9 INJECTION INTRAMUSCULAR; INTRAVENOUS; SUBCUTANEOUS at 19:43

## 2018-04-18 RX ADMIN — SODIUM CHLORIDE 500 MILLILITER(S): 9 INJECTION INTRAMUSCULAR; INTRAVENOUS; SUBCUTANEOUS at 13:44

## 2018-04-18 NOTE — ED PROVIDER NOTE - PHYSICAL EXAMINATION
VITAL SIGNS: I have reviewed the initial vital signs.   CONSTITUTIONAL: Awake, alert. Elderly; in no distress. Non-toxic appearing.   SKIN: No rash, vesicles/lesion, abrasions or lacerations. No ecchymosis or signs of trauma. Cap refill < 2 secs.   HEAD: Normocephalic; atraumatic.   EYES: Symmetrical, no discharge or signs of trauma. Conjunctiva and sclera clear.   ENT: Airway patent. MMM.   NECK: Supple; non-tender.   CARD: No chest wall deformity or tenderness. S1, S2 normal; no murmurs, gallops, or rubs. Regular rate and rhythm.  RESP: Good air movement. Lungs CTAB. No crackles, wheezes, rales or rhonchi.  ABD: Soft; non-distended; non-tender. No rebound/guarding/rigidity.   EXT: No bony deformity or tenderness. Normal ROM x 4 extremities.   NEURO: Strength 5/5 UE/LE b/l. No sensory deficits. n/v intact UE/LE b/l, pulses symmetrical.   PSYCH: Cooperative, appropriate.

## 2018-04-18 NOTE — H&P ADULT - ASSESSMENT
· Chief Complaint: The patient is a 85y Male complaining of weakness.	  · HPI Objective Statement: Pt is an 84 y/o Male, PMHX of htn, DM, presents to ED accompanied by daughter for altered mental status, weakness, lethargy. Daughter reports a few days ago pt was coughing, had post nasal drip, she thought it was a cold and gave him over the counter cold medicines. She reports pt has also had a decreased PO intake over the last several days. Reports low grade fevers at home. Agata productive cough, hemoptysis, chest pain, SOB, abdominal pain, n/v/d, hematuria, dysuria.

## 2018-04-18 NOTE — ED PROVIDER NOTE - MEDICAL DECISION MAKING DETAILS
I personally evaluated the patient. I reviewed the Resident’s or Physician Assistant’s note (as assigned above), and agree with the findings and plan except as documented in my note. Pt brought in with daughter with c/o increased weakness. Daughter states that the patient began to have URI symptoms this week and since then the patient has increased lethargy, with b/b incontinence, decreased PO intake. On exam: NCAT. PERRLA, EOMI, DMM. OP clear. Lungs CTAB. RRR, S1S2 noted. Radial pulses 2+ and equal, pedal pulses 2+ and equal. Abdomen soft, NT/ND, no rebound or guarding. FROM x4 extremities. No focal neuro deficits. Plan: I personally evaluated the patient. I reviewed the Resident’s or Physician Assistant’s note (as assigned above), and agree with the findings and plan except as documented in my note. Pt brought in with daughter with c/o increased weakness. Daughter states that the patient began to have URI symptoms this week and since then the patient has increased lethargy, with b/b incontinence, decreased PO intake. On exam: NCAT. PERRLA, EOMI, DMM. OP clear. Lungs CTAB. RRR, S1S2 noted. Radial pulses 2+ and equal, pedal pulses 2+ and equal. Abdomen soft, NT/ND, no rebound or guarding. FROM x4 extremities. No focal neuro deficits. Plan: Patient has cough, and change in mental status he is not eating usual po fluids and food according the daughter which is a drastic change I will admit for further monitoring I suspect potential infection. I have seen the patient on 2 prior occasions this represents something different.  I will likely admit for further management and increasing confusion

## 2018-04-18 NOTE — PATIENT PROFILE ADULT. - ABILITY TO HEAR (WITH HEARING AID OR HEARING APPLIANCE IF NORMALLY USED):
uses hearing aid/Mildly to Moderately Impaired: difficulty hearing in some environments or speaker may need to increase volume or speak distinctly

## 2018-04-18 NOTE — ED PROVIDER NOTE - ATTENDING CONTRIBUTION TO CARE
I personally evaluated the patient. I reviewed the Resident’s or Physician Assistant’s note (as assigned above), and agree with the findings and plan except as documented in my note. Pt brought in with daughter with c/o increased weakness. Daughter states that the patient began to have URI symptoms this week and since then the patient has increased lethargy, with b/b incontinence, decreased PO intake. On exam: NCAT. PERRLA, EOMI, DMM. OP clear. Lungs CTAB. RRR, S1S2 noted. Radial pulses 2+ and equal, pedal pulses 2+ and equal. Abdomen soft, NT/ND, no rebound or guarding. FROM x4 extremities. No focal neuro deficits. Plan: Patient has cough, and change in mental status he is not eating usual po fluids and food according the daughter which is a drastic change I will admit for further monitoring I suspect potential infection. I have seen the patient on 2 prior occasions this represents something different.  I will likely admit for further management and increasing confusion

## 2018-04-18 NOTE — H&P ADULT - NSHPPHYSICALEXAM_GEN_ALL_CORE
GENERAL:  86y/o Male NAD, resting comfortably.  HEAD:  Atraumatic, Normocephalic  EYES: EOMI, PERRLA, conjunctiva and sclera clear  NECK: Supple, No JVD, no cervical lymphadenopathy, non-tender  CHEST/LUNG: Clear to auscultation bilaterally; No wheeze, rhonchi, or rales  HEART: Regular rate and rhythm; S1&S2  ABDOMEN: Soft, Nontender, Nondistended x 4 quadrants; Bowel sounds present  EXTREMITIES:   Peripheral Pulses Present, No clubbing, no cyanosis, or no edema, no calf tenderness  PSYCH: AAOx3, cooperative, appropriate  NEUROLOGY: WNL  SKIN: WNL

## 2018-04-18 NOTE — ED PROVIDER NOTE - NS ED ROS FT
Except as documented in HPI, all other ROS negative.   GENERAL: Denies fever/chills, loss of appetite/weight or fatigue.  SKIN: Denies rashes, abrasions, lacerations, ecchymosis, erythema, or edema.  HEAD: Denies headache, dizziness or trauma.  CARDIAC: Denies chest pain, palpitations, or SOB.   RESPIRATORY: Denies SOB, cough, hemoptysis or wheezing.   GI: Denies abdominal pain, n/v/d.   : Denies hematuria, dysuria or frequency.   MSK: Denies myalgias, bony deformity or pain.   NEURO: + AMS, + weakness, + lethargy.

## 2018-04-18 NOTE — ED PROVIDER NOTE - PMH
Afib    Diabetes    High cholesterol    Hypertension Afib    Dementia    Diabetes    High cholesterol    Hypertension

## 2018-04-18 NOTE — H&P ADULT - ATTENDING COMMENTS
86yo male sent to ER due to altered mental status which included lethargy and weakness along with poor appetite. Patient seen at bedside but no family currently at bedside. He is fully awake and appears comfortable. He denies any pain or discomfort. On exam NC/AT LUNGS- CTA CV-RR+R  ABDOMEN - soft NEURO- no gross focal defects Assessment + Plan for now will monitor for symptom reccurence Will also consider  evaluation

## 2018-04-18 NOTE — H&P ADULT - NSHPLABSRESULTS_GEN_ALL_CORE
10.9   8.28  )-----------( 174      ( 18 Apr 2018 12:34 )             34.1       04-18    138  |  100  |  21<H>  ----------------------------<  150<H>  4.0   |  25  |  1.2    Ca    9.3      18 Apr 2018 12:34    TPro  6.2  /  Alb  3.8  /  TBili  1.2  /  DBili  x   /  AST  17  /  ALT  9   /  AlkPhos  60  04-18              Urinalysis Basic - ( 18 Apr 2018 14:28 )    Color: Yellow / Appearance: Clear / SG: >=1.030 / pH: x  Gluc: x / Ketone: 40  / Bili: Small / Urobili: 0.2   Blood: x / Protein: 100 / Nitrite: Negative   Leuk Esterase: Negative / RBC: 2-5 /HPF / WBC 1-2 /HPF   Sq Epi: x / Non Sq Epi: Occasional /HPF / Bacteria: Few            Lactate Trend  04-18 @ 12:34 Lactate:1.4             CAPILLARY BLOOD GLUCOSE

## 2018-04-19 ENCOUNTER — TRANSCRIPTION ENCOUNTER (OUTPATIENT)
Age: 83
End: 2018-04-19

## 2018-04-19 VITALS
DIASTOLIC BLOOD PRESSURE: 56 MMHG | SYSTOLIC BLOOD PRESSURE: 113 MMHG | TEMPERATURE: 98 F | RESPIRATION RATE: 14 BRPM | HEART RATE: 88 BPM

## 2018-04-19 RX ADMIN — CARVEDILOL PHOSPHATE 12.5 MILLIGRAM(S): 80 CAPSULE, EXTENDED RELEASE ORAL at 05:47

## 2018-04-19 RX ADMIN — PANTOPRAZOLE SODIUM 40 MILLIGRAM(S): 20 TABLET, DELAYED RELEASE ORAL at 05:48

## 2018-04-19 RX ADMIN — HEPARIN SODIUM 5000 UNIT(S): 5000 INJECTION INTRAVENOUS; SUBCUTANEOUS at 05:48

## 2018-04-19 RX ADMIN — LOSARTAN POTASSIUM 100 MILLIGRAM(S): 100 TABLET, FILM COATED ORAL at 05:48

## 2018-04-19 RX ADMIN — MEMANTINE HYDROCHLORIDE 10 MILLIGRAM(S): 10 TABLET ORAL at 05:48

## 2018-04-19 NOTE — PHYSICAL THERAPY INITIAL EVALUATION ADULT - GENERAL OBSERVATIONS, REHAB EVAL
Time: 11: 00 to 11: 20. Chart reviewed, pt received supine in bed, NAD, no c/o pain, +daughter at bedside.

## 2018-04-19 NOTE — PROGRESS NOTE ADULT - ASSESSMENT
Bladder disorder: Bladder disorder  Dementia: Dementia  High cholesterol: High cholesterol  Diabetes: Diabetes  Hypertension: Hypertension  Altered mental status: Altered mental status

## 2018-04-19 NOTE — DISCHARGE NOTE ADULT - CARE PLAN
Principal Discharge DX:	Dementia  Goal:	stable, resolution  Assessment and plan of treatment:	pt mental status at baseline, ambulated with physical therapist, uses walker at home. no complaints

## 2018-04-19 NOTE — DISCHARGE NOTE ADULT - MEDICATION SUMMARY - MEDICATIONS TO TAKE
I will START or STAY ON the medications listed below when I get home from the hospital:    losartan 100 mg oral tablet  -- 1 tab(s) by mouth once a day  -- Indication: For Hypertension    glipiZIDE 10 mg oral tablet, extended release  -- 2 tab(s) by mouth 2 times a day  -- Indication: For Diabetes    carvedilol 12.5 mg oral tablet  -- 1 tab(s) by mouth 2 times a day  -- Indication: For Hypertension    donepezil 10 mg oral tablet  -- orally once a day  -- Indication: For Dementia    memantine 10 mg oral tablet  -- 1 tab(s) by mouth 2 times a day  -- Indication: For Dementia    omeprazole 20 mg oral delayed release tablet  -- 1 tab(s) by mouth 2 times a day  -- Indication: For gerd    oxybutynin 10 mg/24 hr oral tablet, extended release  -- orally once a day (at bedtime)  -- Indication: For Bladder disorder

## 2018-04-19 NOTE — DISCHARGE NOTE ADULT - PLAN OF CARE
stable, resolution pt mental status at baseline, ambulated with physical therapist, uses walker at home. no complaints

## 2018-04-19 NOTE — DISCHARGE NOTE ADULT - HOSPITAL COURSE
pt admitted with altered mental status, and decreased PO intake, mental status at baseline. pt ambulated with physical therapist using a walker. uses a walker at baseline. lives at home with his daughter. no need for rehab services. pt has no complaints, urinalysis negative, cxr negative.

## 2018-04-19 NOTE — DISCHARGE NOTE ADULT - PATIENT PORTAL LINK FT
You can access the [facility], offered by [facility], by registering with the following website: [email or web address]

## 2018-04-19 NOTE — DISCHARGE NOTE ADULT - ABILITY TO HEAR (WITH HEARING AID OR HEARING APPLIANCE IF NORMALLY USED):
Mildly to Moderately Impaired: difficulty hearing in some environments or speaker may need to increase volume or speak distinctly/uses hearing aid

## 2018-04-19 NOTE — PHYSICAL THERAPY INITIAL EVALUATION ADULT - CRITERIA FOR SKILLED THERAPEUTIC INTERVENTIONS
anticipated equipment needs at discharge/therapy frequency/rehab potential/predicted duration of therapy intervention/impairments found

## 2018-04-19 NOTE — DISCHARGE NOTE ADULT - CARE PROVIDER_API CALL
Geremias Acuña), Internal Medicine  [address] [place] [id number]  Phone: [phone]  Fax: [phone]

## 2018-07-05 ENCOUNTER — INPATIENT (INPATIENT)
Facility: HOSPITAL | Age: 83
LOS: 1 days | Discharge: HOME | End: 2018-07-07
Attending: INTERNAL MEDICINE | Admitting: INTERNAL MEDICINE
Payer: MEDICARE

## 2018-07-05 VITALS
RESPIRATION RATE: 19 BRPM | DIASTOLIC BLOOD PRESSURE: 72 MMHG | SYSTOLIC BLOOD PRESSURE: 165 MMHG | WEIGHT: 220.02 LBS | HEART RATE: 54 BPM | OXYGEN SATURATION: 98 % | HEIGHT: 67 IN | TEMPERATURE: 97 F

## 2018-07-05 RX ORDER — PANTOPRAZOLE SODIUM 20 MG/1
40 TABLET, DELAYED RELEASE ORAL
Qty: 0 | Refills: 0 | Status: DISCONTINUED | OUTPATIENT
Start: 2018-07-05 | End: 2018-07-07

## 2018-07-05 RX ORDER — DEXTROSE 50 % IN WATER 50 %
15 SYRINGE (ML) INTRAVENOUS ONCE
Qty: 0 | Refills: 0 | Status: DISCONTINUED | OUTPATIENT
Start: 2018-07-05 | End: 2018-07-07

## 2018-07-05 RX ORDER — POLYETHYLENE GLYCOL 3350 17 G/17G
17 POWDER, FOR SOLUTION ORAL ONCE
Qty: 0 | Refills: 0 | Status: COMPLETED | OUTPATIENT
Start: 2018-07-05 | End: 2018-07-05

## 2018-07-05 RX ORDER — SODIUM CHLORIDE 9 MG/ML
1000 INJECTION, SOLUTION INTRAVENOUS
Qty: 0 | Refills: 0 | Status: DISCONTINUED | OUTPATIENT
Start: 2018-07-05 | End: 2018-07-07

## 2018-07-05 RX ORDER — LOSARTAN POTASSIUM 100 MG/1
100 TABLET, FILM COATED ORAL DAILY
Qty: 0 | Refills: 0 | Status: DISCONTINUED | OUTPATIENT
Start: 2018-07-05 | End: 2018-07-07

## 2018-07-05 RX ORDER — DEXTROSE 50 % IN WATER 50 %
25 SYRINGE (ML) INTRAVENOUS ONCE
Qty: 0 | Refills: 0 | Status: DISCONTINUED | OUTPATIENT
Start: 2018-07-05 | End: 2018-07-07

## 2018-07-05 RX ORDER — FUROSEMIDE 40 MG
20 TABLET ORAL DAILY
Qty: 0 | Refills: 0 | Status: DISCONTINUED | OUTPATIENT
Start: 2018-07-05 | End: 2018-07-07

## 2018-07-05 RX ORDER — GLUCAGON INJECTION, SOLUTION 0.5 MG/.1ML
1 INJECTION, SOLUTION SUBCUTANEOUS ONCE
Qty: 0 | Refills: 0 | Status: DISCONTINUED | OUTPATIENT
Start: 2018-07-05 | End: 2018-07-07

## 2018-07-05 RX ORDER — CARVEDILOL PHOSPHATE 80 MG/1
12.5 CAPSULE, EXTENDED RELEASE ORAL EVERY 12 HOURS
Qty: 0 | Refills: 0 | Status: DISCONTINUED | OUTPATIENT
Start: 2018-07-05 | End: 2018-07-07

## 2018-07-05 RX ORDER — DEXTROSE 50 % IN WATER 50 %
12.5 SYRINGE (ML) INTRAVENOUS ONCE
Qty: 0 | Refills: 0 | Status: DISCONTINUED | OUTPATIENT
Start: 2018-07-05 | End: 2018-07-07

## 2018-07-05 RX ORDER — INSULIN LISPRO 100/ML
VIAL (ML) SUBCUTANEOUS
Qty: 0 | Refills: 0 | Status: DISCONTINUED | OUTPATIENT
Start: 2018-07-05 | End: 2018-07-07

## 2018-07-05 RX ORDER — HEPARIN SODIUM 5000 [USP'U]/ML
5000 INJECTION INTRAVENOUS; SUBCUTANEOUS EVERY 12 HOURS
Qty: 0 | Refills: 0 | Status: DISCONTINUED | OUTPATIENT
Start: 2018-07-05 | End: 2018-07-07

## 2018-07-05 RX ORDER — LACTULOSE 10 G/15ML
20 SOLUTION ORAL EVERY 4 HOURS
Qty: 0 | Refills: 0 | Status: DISCONTINUED | OUTPATIENT
Start: 2018-07-05 | End: 2018-07-06

## 2018-07-05 RX ORDER — DONEPEZIL HYDROCHLORIDE 10 MG/1
10 TABLET, FILM COATED ORAL AT BEDTIME
Qty: 0 | Refills: 0 | Status: DISCONTINUED | OUTPATIENT
Start: 2018-07-05 | End: 2018-07-07

## 2018-07-05 RX ORDER — MEMANTINE HYDROCHLORIDE 10 MG/1
10 TABLET ORAL
Qty: 0 | Refills: 0 | Status: DISCONTINUED | OUTPATIENT
Start: 2018-07-05 | End: 2018-07-07

## 2018-07-05 RX ORDER — OXYBUTYNIN CHLORIDE 5 MG
5 TABLET ORAL
Qty: 0 | Refills: 0 | Status: DISCONTINUED | OUTPATIENT
Start: 2018-07-05 | End: 2018-07-07

## 2018-07-05 RX ORDER — INSULIN GLARGINE 100 [IU]/ML
10 INJECTION, SOLUTION SUBCUTANEOUS AT BEDTIME
Qty: 0 | Refills: 0 | Status: DISCONTINUED | OUTPATIENT
Start: 2018-07-05 | End: 2018-07-07

## 2018-07-05 RX ADMIN — DONEPEZIL HYDROCHLORIDE 10 MILLIGRAM(S): 10 TABLET, FILM COATED ORAL at 23:18

## 2018-07-05 RX ADMIN — POLYETHYLENE GLYCOL 3350 17 GRAM(S): 17 POWDER, FOR SOLUTION ORAL at 14:59

## 2018-07-05 RX ADMIN — INSULIN GLARGINE 10 UNIT(S): 100 INJECTION, SOLUTION SUBCUTANEOUS at 23:18

## 2018-07-05 RX ADMIN — LACTULOSE 20 GRAM(S): 10 SOLUTION ORAL at 23:18

## 2018-07-05 NOTE — ED PROVIDER NOTE - OBJECTIVE STATEMENT
86 y/o male with 86 y/o male with dementia, HTN, DM presents for medical evaluation. Patient's daughter states for the past 5 years, patient has had intermittent issues with constipation, has been on multiple therapies including miralax daily, however she feels as though she is unable to appropriately manage it at home anymore. Patient is a poor historian, unable to provide history.

## 2018-07-05 NOTE — ED PROVIDER NOTE - ATTENDING CONTRIBUTION TO CARE
abd is NT.  xray shows large stool burden.  no obstruction.  sx tx ordered.  out pt mgmt d/w daughter. abd is NT.  xray shows large stool burden.  no obstruction.  sx tx ordered.  out pt mgmt d/w daughter.  daughter feels she is unable to care for father safely and he is a risk to fall.  will admit for safety.

## 2018-07-05 NOTE — ED PROVIDER NOTE - PHYSICAL EXAMINATION
CONSTITUTIONAL: Well-developed; well-nourished; in no acute distress.   SKIN: warm, dry  HEAD: Normocephalic; atraumatic.  EYES: no conj injection  ENT: No nasal discharge; airway clear.  NECK: Supple; non tender.  CARD: S1, S2 normal; no murmurs, gallops, or rubs. Regular rate and rhythm.   RESP: No wheezes, rales or rhonchi.  ABD: soft ntnd  EXT: No clubbing, cyanosis or edema.   NEURO: grossly unremarkable  PSYCH: Cooperative, appropriate.

## 2018-07-05 NOTE — H&P ADULT - NSHPPHYSICALEXAM_GEN_ALL_CORE
VITALS:  T(F): 97.1 (07-05-18 @ 17:39), Max: 97.1 (07-05-18 @ 17:39)  HR: 55 (07-05-18 @ 17:39) (54 - 55)  BP: 139/64 (07-05-18 @ 17:39) (139/64 - 165/72)  RR: 18 (07-05-18 @ 17:39) (18 - 19)  SpO2: 97% (07-05-18 @ 17:39) (97% - 98%)    PHYSICAL EXAM:  GENERAL: NAD, well-groomed, well-developed  HEAD:  Atraumatic, Normocephalic  EYES: EOMI, PERRLA, conjunctiva and sclera clear  ENMT: No tonsillar erythema, exudates, or enlargement; Moist mucous membranes, Good dentition, No lesions  NECK: Supple, No JVD, Normal thyroid  NERVOUS SYSTEM:  Alert & Oriented X3, Good concentration; Motor Strength 5/5 B/L upper and lower extremities; DTRs 2+ intact and symmetric  CHEST/LUNG: Clear to percussion bilaterally; No rales, rhonchi, wheezing, or rubs  HEART: Regular rate and rhythm; No murmurs, rubs, or gallops  ABDOMEN: Soft, Nontender, Nondistended; Bowel sounds present  EXTREMITIES:  2+ Peripheral Pulses, No clubbing, cyanosis, or edema  LYMPH: No lymphadenopathy noted  SKIN: No rashes or lesions VITALS:  T(F): 97.1 (07-05-18 @ 17:39), Max: 97.1 (07-05-18 @ 17:39)  HR: 55 (07-05-18 @ 17:39) (54 - 55)  BP: 139/64 (07-05-18 @ 17:39) (139/64 - 165/72)  RR: 18 (07-05-18 @ 17:39) (18 - 19)  SpO2: 97% (07-05-18 @ 17:39) (97% - 98%)    PHYSICAL EXAM:  GENERAL: NAD, well-groomed, well-developed  HEAD:  Atraumatic, Normocephalic  EYES: conjunctiva and sclera clear  ENMT: Moist mucous membranes  NECK: Supple, Normal thyroid  NERVOUS SYSTEM:  Alert & Oriented X 1, Motor Strength 5/5 B/L upper and lower extremities  CHEST/LUNG: Clear to auscultation bilaterally; No rales, rhonchi, wheezing, or rubs  HEART: Regular rate and rhythm; No murmurs, rubs, or gallops  ABDOMEN: Soft, Nontender, Nondistended; Bowel sounds present  EXTREMITIES:  2+ Peripheral Pulses, No clubbing, cyanosis, Bipedal edema  LYMPH: No lymphadenopathy noted  SKIN: No rashes or lesions

## 2018-07-05 NOTE — ED ADULT NURSE REASSESSMENT NOTE - NS ED NURSE REASSESS COMMENT FT1
soap water enema administered. Pt tolerated 1000 ml. Waiting for results. Will continue to monitor. Daughter at bedside. No acute distress noted.
MD aware B/L LE swollen.
no bowel movement at this time. Daughter at bedside. Pt ate dinner.  No acute distress noted at this time.

## 2018-07-05 NOTE — H&P ADULT - NSHPSOCIALHISTORY_GEN_ALL_CORE
Lives at home with daughter. Lives at home with daughter.  No tobacco, illicit drugs or Alcohol use.

## 2018-07-05 NOTE — H&P ADULT - HISTORY OF PRESENT ILLNESS
Patient is a 86 y/o Male with PMHx of Dementia, Afib, High cholesterol, Hypertension, NIDDM, No significant past surgical history Patient is a 84 y/o Male with PMHx of Dementia, Afib, High cholesterol, Hypertension, NIDDM, CAD s/p CABG brought in from home by Daughter with complaints of worsening Constipation. Patient's daughter states for the past 5 1/2 years, patient has had intermittent issues with constipation with associated Syncopal/near syncopal events. He has never been evaluated by GI, not sure a prior colonoscopy, no hematochezia, but has been on multiple therapies including Miralax daily. For the past few weeks however symptoms have been very difficult for her to manage at home anymore. Patient due to Dementia a could not provide history except "I don't feel well". X-ray showed Stool filled colon with no evidence of obstruction.

## 2018-07-05 NOTE — H&P ADULT - NSHPLABSRESULTS_GEN_ALL_CORE
X-ray Abdomen 1 View Portable, IMMEDIATE (07.05.18 @ 15:23)     Comparison 3/20/2018  The bowel gas pattern is normal.  No free air is seen.  There is a good deal of stool throughout the colon unchanged.  Impression normal bowel gas pattern

## 2018-07-05 NOTE — H&P ADULT - ASSESSMENT
Assessment and Plan:    1. Constipation:  No evidence of obstruction Continue Tap water enemas and Lactulose  GI consult.  Last colonoscopy unknown.      2. NIDDM:  Monitor Finger sticks.   Continue Correctional dose Insulin.      3. CAD s/p CBAG:  Continue Coreg    4. Dementia:   On Aricept and memantine.      DVT prophylaxis: Heparin Patient is a 86 y/o Male with PMHx of Dementia, Afib, High cholesterol, Hypertension, NIDDM, CAD s/p CABG brought in from home by Daughter with complaints of worsening Constipation. Patient's daughter states for the past 5 1/2 years, patient has had intermittent issues with constipation with associated Syncopal/near syncopal events. He has never been evaluated by GI, not sure a prior colonoscopy, no hematochezia, but has been on multiple therapies including Miralax daily. For the past few weeks however symptoms have been very difficult for her to manage at home anymore. Patient due to Dementia a could not provide history except "I don't feel well". X-ray showed Stool filled colon with no evidence of obstruction.    Assessment and Plan:    1. Constipation:  No evidence of obstruction Continue Tap water enemas and Lactulose/Mag Citrate.  GI consulted. Last colonoscopy unknown.      2. NIDDM:  Monitor Finger sticks. Hold Glipizide.  Continue Correctional dose Insulin & Lantus.      3. CAD s/p CABG  Continue Coreg. Not on Aspirin or Statin.    4. Dementia:   On Aricept and memantine.      DVT prophylaxis: Heparin

## 2018-07-06 LAB
ALBUMIN SERPL ELPH-MCNC: 3.7 G/DL — SIGNIFICANT CHANGE UP (ref 3.5–5.2)
ALP SERPL-CCNC: 65 U/L — SIGNIFICANT CHANGE UP (ref 30–115)
ALT FLD-CCNC: 8 U/L — SIGNIFICANT CHANGE UP (ref 0–41)
ANION GAP SERPL CALC-SCNC: 11 MMOL/L — SIGNIFICANT CHANGE UP (ref 7–14)
AST SERPL-CCNC: 11 U/L — SIGNIFICANT CHANGE UP (ref 0–41)
BILIRUB SERPL-MCNC: 0.4 MG/DL — SIGNIFICANT CHANGE UP (ref 0.2–1.2)
BUN SERPL-MCNC: 21 MG/DL — HIGH (ref 10–20)
CALCIUM SERPL-MCNC: 9.2 MG/DL — SIGNIFICANT CHANGE UP (ref 8.5–10.1)
CHLORIDE SERPL-SCNC: 104 MMOL/L — SIGNIFICANT CHANGE UP (ref 98–110)
CO2 SERPL-SCNC: 27 MMOL/L — SIGNIFICANT CHANGE UP (ref 17–32)
CREAT SERPL-MCNC: 1.1 MG/DL — SIGNIFICANT CHANGE UP (ref 0.7–1.5)
ESTIMATED AVERAGE GLUCOSE: 160 MG/DL — HIGH (ref 68–114)
GLUCOSE SERPL-MCNC: 111 MG/DL — HIGH (ref 70–99)
HBA1C BLD-MCNC: 7.2 % — HIGH (ref 4–5.6)
HCT VFR BLD CALC: 35 % — LOW (ref 42–52)
HGB BLD-MCNC: 11.3 G/DL — LOW (ref 14–18)
MAGNESIUM SERPL-MCNC: 2 MG/DL — SIGNIFICANT CHANGE UP (ref 1.8–2.4)
MCHC RBC-ENTMCNC: 27.8 PG — SIGNIFICANT CHANGE UP (ref 27–31)
MCHC RBC-ENTMCNC: 32.3 G/DL — SIGNIFICANT CHANGE UP (ref 32–37)
MCV RBC AUTO: 86.2 FL — SIGNIFICANT CHANGE UP (ref 80–94)
NRBC # BLD: 0 /100 WBCS — SIGNIFICANT CHANGE UP (ref 0–0)
PHOSPHATE SERPL-MCNC: 3.5 MG/DL — SIGNIFICANT CHANGE UP (ref 2.1–4.9)
PLATELET # BLD AUTO: 200 K/UL — SIGNIFICANT CHANGE UP (ref 130–400)
POTASSIUM SERPL-MCNC: 4.1 MMOL/L — SIGNIFICANT CHANGE UP (ref 3.5–5)
POTASSIUM SERPL-SCNC: 4.1 MMOL/L — SIGNIFICANT CHANGE UP (ref 3.5–5)
PROT SERPL-MCNC: 5.9 G/DL — LOW (ref 6–8)
RBC # BLD: 4.06 M/UL — LOW (ref 4.7–6.1)
RBC # FLD: 14.5 % — SIGNIFICANT CHANGE UP (ref 11.5–14.5)
SODIUM SERPL-SCNC: 142 MMOL/L — SIGNIFICANT CHANGE UP (ref 135–146)
WBC # BLD: 4.88 K/UL — SIGNIFICANT CHANGE UP (ref 4.8–10.8)
WBC # FLD AUTO: 4.88 K/UL — SIGNIFICANT CHANGE UP (ref 4.8–10.8)

## 2018-07-06 RX ORDER — POLYETHYLENE GLYCOL 3350 17 G/17G
17 POWDER, FOR SOLUTION ORAL
Qty: 0 | Refills: 0 | Status: DISCONTINUED | OUTPATIENT
Start: 2018-07-06 | End: 2018-07-07

## 2018-07-06 RX ORDER — SENNA PLUS 8.6 MG/1
2 TABLET ORAL AT BEDTIME
Qty: 0 | Refills: 0 | Status: DISCONTINUED | OUTPATIENT
Start: 2018-07-06 | End: 2018-07-07

## 2018-07-06 RX ORDER — MULTIVIT WITH MIN/MFOLATE/K2 340-15/3 G
300 POWDER (GRAM) ORAL ONCE
Qty: 0 | Refills: 0 | Status: COMPLETED | OUTPATIENT
Start: 2018-07-06 | End: 2018-07-06

## 2018-07-06 RX ADMIN — Medication 300 MILLILITER(S): at 09:47

## 2018-07-06 RX ADMIN — SENNA PLUS 2 TABLET(S): 8.6 TABLET ORAL at 21:15

## 2018-07-06 RX ADMIN — LACTULOSE 20 GRAM(S): 10 SOLUTION ORAL at 02:35

## 2018-07-06 RX ADMIN — MEMANTINE HYDROCHLORIDE 10 MILLIGRAM(S): 10 TABLET ORAL at 05:47

## 2018-07-06 RX ADMIN — Medication 5 MILLIGRAM(S): at 17:04

## 2018-07-06 RX ADMIN — DONEPEZIL HYDROCHLORIDE 10 MILLIGRAM(S): 10 TABLET, FILM COATED ORAL at 21:14

## 2018-07-06 RX ADMIN — Medication 20 MILLIGRAM(S): at 05:47

## 2018-07-06 RX ADMIN — LACTULOSE 20 GRAM(S): 10 SOLUTION ORAL at 05:47

## 2018-07-06 RX ADMIN — LACTULOSE 20 GRAM(S): 10 SOLUTION ORAL at 09:47

## 2018-07-06 RX ADMIN — LOSARTAN POTASSIUM 100 MILLIGRAM(S): 100 TABLET, FILM COATED ORAL at 05:47

## 2018-07-06 RX ADMIN — Medication 5 MILLIGRAM(S): at 05:47

## 2018-07-06 RX ADMIN — MEMANTINE HYDROCHLORIDE 10 MILLIGRAM(S): 10 TABLET ORAL at 17:04

## 2018-07-06 RX ADMIN — CARVEDILOL PHOSPHATE 12.5 MILLIGRAM(S): 80 CAPSULE, EXTENDED RELEASE ORAL at 05:46

## 2018-07-06 RX ADMIN — HEPARIN SODIUM 5000 UNIT(S): 5000 INJECTION INTRAVENOUS; SUBCUTANEOUS at 05:48

## 2018-07-06 RX ADMIN — HEPARIN SODIUM 5000 UNIT(S): 5000 INJECTION INTRAVENOUS; SUBCUTANEOUS at 17:04

## 2018-07-06 RX ADMIN — CARVEDILOL PHOSPHATE 12.5 MILLIGRAM(S): 80 CAPSULE, EXTENDED RELEASE ORAL at 17:04

## 2018-07-06 RX ADMIN — PANTOPRAZOLE SODIUM 40 MILLIGRAM(S): 20 TABLET, DELAYED RELEASE ORAL at 06:20

## 2018-07-06 RX ADMIN — Medication 1: at 17:02

## 2018-07-06 RX ADMIN — Medication 3: at 12:48

## 2018-07-06 RX ADMIN — INSULIN GLARGINE 10 UNIT(S): 100 INJECTION, SOLUTION SUBCUTANEOUS at 21:17

## 2018-07-06 NOTE — PROGRESS NOTE ADULT - SUBJECTIVE AND OBJECTIVE BOX
Patient is an 86 y/o gentleman with PMHx of A-Fib, CAD ( previous CABG), HTN, DM, HLD, and dementia that presents from home for Syncopal episodes that are brought on by constipation . When attempting to toilet patient becomes syncopal. As per note patient has been on Miralax and many available over the counter agents. Patient currently feels well. Patient was placed on Lactulose and has had bowel movement. He currently denies any complaints but appears to be a poor historian overall.  Patient denies any current fever, chills, headache, dizziness, change in vision/hearing/speech, cough, SOB, chest pain, palpitations, abdominal pain, nausea/emesis, flank pain, burning on urination, swelling of lower extremities, itchiness, or change in skin color.    Vital Signs:  T(F): 97.4   HR: 63   BP: 140/79   RR: 16   SpO2: 97%  Physical Exam  Gen: NAD  HEENT: NC/AT, Mucosal Membranes  Cardio: S1/S2 No S3/S4, Regular  Resp: CTA B/L  Abdomen: Soft, ND/NT  Extremities: FROM x 4                          11.3   4.88  )-----------( 200      ( 06 Jul 2018 05:47 )             35.0   07-06    142  |  104  |  21<H>  ----------------------------<  111<H>  4.1   |  27  |  1.1    Ca    9.2      06 Jul 2018 05:47  Phos  3.5     07-06  Mg     2.0     07-06    TPro  5.9<L>  /  Alb  3.7  /  TBili  0.4  /  DBili  x   /  AST  11  /  ALT  8   /  AlkPhos  65  07-06 Patient is an 86 y/o gentleman with PMHx of A-Fib, CAD ( previous CABG), HTN, DM, HLD, and dementia that presents from home for Syncopal episodes that are brought on by constipation . When attempting to toilet patient becomes syncopal. As per note patient has been on Miralax and many available over the counter agents. Patient currently feels well. Patient was placed on Lactulose and has had bowel movements. He currently denies any complaints (other than a reduced appetite) but appears to be a poor historian overall.  Patient denies any current fever, chills, headache, dizziness, change in vision/hearing/speech, cough, SOB, chest pain, palpitations, abdominal pain, nausea/emesis, flank pain, burning on urination, swelling of lower extremities, itchiness, or change in skin color.    Vital Signs:  T(F): 97.4   HR: 63   BP: 140/79   RR: 16   SpO2: 97%  Physical Exam  Gen: NAD  HEENT: NC/AT, Mucosal Membranes  Cardio: S1/S2 No S3/S4, Regular  Resp: CTA B/L  Abdomen: Soft, ND/NT  Extremities: FROM x 4                          11.3   4.88  )-----------( 200      ( 06 Jul 2018 05:47 )             35.0   07-06    142  |  104  |  21<H>  ----------------------------<  111<H>  4.1   |  27  |  1.1    Ca    9.2      06 Jul 2018 05:47  Phos  3.5     07-06  Mg     2.0     07-06    TPro  5.9<L>  /  Alb  3.7  /  TBili  0.4  /  DBili  x   /  AST  11  /  ALT  8   /  AlkPhos  65  07-06

## 2018-07-06 NOTE — PROGRESS NOTE ADULT - SUBJECTIVE AND OBJECTIVE BOX
DIONNE CURIEL  85y  Male    Patient is a 85y old  Male who presents with a chief complaint of c/o constipation (05 Jul 2018 20:38)      INTERVAL HPI/OVERNIGHT EVENTS:  No interval events. patient had a small bowel movement overnight.    REVIEW OF SYSTEMS: Unable to fully obtain due to Dementia.  CONSTITUTIONAL: No fever, weight loss, or fatigue  EYES: No eye pain, visual disturbances, or discharge  ENMT:  No difficulty hearing, tinnitus, vertigo; No sinus or throat pain  RESPIRATORY: No cough, wheezing, chills or hemoptysis; No shortness of breath  CARDIOVASCULAR: No chest pain, palpitations, dizziness, or leg swelling  GASTROINTESTINAL: No abdominal or epigastric pain. No nausea, vomiting, or hematemesis; No diarrhea or constipation. No melena or hematochezia.  GENITOURINARY: No dysuria, frequency, hematuria, or incontinence  NEUROLOGICAL: No headaches, loss of strength, numbness, or tremors  MUSCULOSKELETAL: No joint pain or swelling; No muscle, back, or extremity pain      VITALS:  T(F): 97.4 (07-06-18 @ 05:28), Max: 98 (07-05-18 @ 22:30)  HR: 63 (07-06-18 @ 05:28) (54 - 64)  BP: 140/79 (07-06-18 @ 05:28) (105/51 - 165/72)  RR: 16 (07-06-18 @ 05:28) (16 - 19)  SpO2: 97% (07-05-18 @ 17:39) (97% - 98%)    PHYSICAL EXAM:  GENERAL: NAD  HEAD:  Atraumatic, Normocephalic  EYES: conjunctiva and sclera clear  ENMT: enlargement; Moist mucous membranes  NECK: Supple, Normal thyroid  NERVOUS SYSTEM:  Alert & Oriented X 1; Motor Strength 5/5 B/L upper and lower extremities  CHEST/LUNG: Clear to percussion bilaterally; No rales, rhonchi, wheezing, or rubs  HEART: Regular rate and rhythm; No murmurs, rubs, or gallops  ABDOMEN: Soft, Nontender, Nondistended; Bowel sounds present  EXTREMITIES:  2+ Peripheral Pulses, No clubbing, cyanosis, or edema  LYMPH: No lymphadenopathy noted  SKIN: Please see nursing assessment.    Consultant(s) Notes Reviewed:  [x ] YES  [ ] NO  Care Discussed with Consultants/Other Providers [ x] YES  [ ] NO    LABS:                        11.3   4.88  )-----------( 200      ( 06 Jul 2018 05:47 )             35.0     07-06    142  |  104  |  21<H>  ----------------------------<  111<H>  4.1   |  27  |  1.1    Ca    9.2      06 Jul 2018 05:47  Phos  3.5     07-06  Mg     2.0     07-06    TPro  5.9<L>  /  Alb  3.7  /  TBili  0.4  /  DBili  x   /  AST  11  /  ALT  8   /  AlkPhos  65  07-06      MICROBIOLOGY: None      RADIOLOGY & ADDITIONAL TESTS:  CT Abdomen and Pelvis w/ Oral Cont and w/ IV Cont (03.14.18 @ 15:04)   No acute abnormality in the abdomen and pelvis.    Normal-appearing rectum. No evidence of proctitis. No perirectal abscess.    Stool seen in the rectum.    Xray Abdomen 1 View Portable, IMMEDIATE (07.05.18 @ 15:23)   Comparison 3/20/2018  The bowel gas pattern is normal.  No free air is seen.  There is a good deal of stool throughout the colon unchanged.  Impression normal bowel gas pattern      Imaging Personally Reviewed:  [x] YES  [ ] NO    MEDICATIONS  (STANDING):  carvedilol 12.5 milliGRAM(s) Oral every 12 hours  dextrose 5%. 1000 milliLiter(s) (50 mL/Hr) IV Continuous <Continuous>  dextrose 50% Injectable 12.5 Gram(s) IV Push once  dextrose 50% Injectable 25 Gram(s) IV Push once  dextrose 50% Injectable 25 Gram(s) IV Push once  donepezil 10 milliGRAM(s) Oral at bedtime  furosemide    Tablet 20 milliGRAM(s) Oral daily  heparin  Injectable 5000 Unit(s) SubCutaneous every 12 hours  insulin glargine Injectable (LANTUS) 10 Unit(s) SubCutaneous at bedtime  insulin lispro (HumaLOG) corrective regimen sliding scale   SubCutaneous three times a day before meals  lactulose Syrup 20 Gram(s) Oral every 4 hours  losartan 100 milliGRAM(s) Oral daily  magnesium citrate Solution 300 milliLiter(s) Oral once  memantine 10 milliGRAM(s) Oral two times a day  oxybutynin 5 milliGRAM(s) Oral two times a day  pantoprazole    Tablet 40 milliGRAM(s) Oral before breakfast    MEDICATIONS  (PRN):  dextrose 40% Gel 15 Gram(s) Oral once PRN Blood Glucose LESS THAN 70 milliGRAM(s)/deciliter  glucagon  Injectable 1 milliGRAM(s) IntraMuscular once PRN Glucose LESS THAN 70 milligrams/deciliter      HEALTH ISSUES - PROBLEM Dx:  Constipation  CAD s/p CABG  Dementia  Afib  High cholesterol  Hypertension  Diabetes DIONNE CURIEL  85y  Male    Patient is a 85y old  Male who presents with a chief complaint of c/o constipation (05 Jul 2018 20:38)      INTERVAL HPI/OVERNIGHT EVENTS:  No interval events. patient had a small bowel movement overnight.    REVIEW OF SYSTEMS: Unable to fully obtain due to Dementia.  CONSTITUTIONAL: No fever, weight loss, or fatigue  EYES: No eye pain, visual disturbances, or discharge  ENMT:  No difficulty hearing, tinnitus, vertigo; No sinus or throat pain  RESPIRATORY: No cough, wheezing, chills or hemoptysis; No shortness of breath  CARDIOVASCULAR: No chest pain, palpitations, dizziness, or leg swelling  GASTROINTESTINAL: No abdominal or epigastric pain. No nausea, vomiting, or hematemesis; No diarrhea or constipation. No melena or hematochezia.  GENITOURINARY: No dysuria, frequency, hematuria, or incontinence  NEUROLOGICAL: No headaches, loss of strength, numbness, or tremors  MUSCULOSKELETAL: No joint pain or swelling; No muscle, back, or extremity pain      VITALS:  T(F): 97.4 (07-06-18 @ 05:28), Max: 98 (07-05-18 @ 22:30)  HR: 63 (07-06-18 @ 05:28) (54 - 64)  BP: 140/79 (07-06-18 @ 05:28) (105/51 - 165/72)  RR: 16 (07-06-18 @ 05:28) (16 - 19)  SpO2: 97% (07-05-18 @ 17:39) (97% - 98%)      CAPILLARY BLOOD GLUCOSE  145 (06 Jul 2018 07:41)  183 (05 Jul 2018 21:30)        PHYSICAL EXAM:  GENERAL: NAD  HEAD:  Atraumatic, Normocephalic  EYES: conjunctiva and sclera clear  ENMT: enlargement; Moist mucous membranes  NECK: Supple, Normal thyroid  NERVOUS SYSTEM:  Alert & Oriented X 1; Motor Strength 5/5 B/L upper and lower extremities  CHEST/LUNG: Clear to percussion bilaterally; No rales, rhonchi, wheezing, or rubs  HEART: Regular rate and rhythm; No murmurs, rubs, or gallops  ABDOMEN: Soft, Nontender, Nondistended; Bowel sounds present  EXTREMITIES:  2+ Peripheral Pulses, No clubbing, cyanosis, Bipedal edema  LYMPH: No lymphadenopathy noted  SKIN: Please see nursing assessment.    Consultant(s) Notes Reviewed:  [x ] YES  [ ] NO  Care Discussed with Consultants/Other Providers [ x] YES  [ ] NO    LABS:                        11.3   4.88  )-----------( 200      ( 06 Jul 2018 05:47 )             35.0     07-06    142  |  104  |  21<H>  ----------------------------<  111<H>  4.1   |  27  |  1.1    Ca    9.2      06 Jul 2018 05:47  Phos  3.5     07-06  Mg     2.0     07-06    TPro  5.9<L>  /  Alb  3.7  /  TBili  0.4  /  DBili  x   /  AST  11  /  ALT  8   /  AlkPhos  65  07-06      MICROBIOLOGY: None      RADIOLOGY & ADDITIONAL TESTS:  CT Abdomen and Pelvis w/ Oral Cont and w/ IV Cont (03.14.18 @ 15:04)   No acute abnormality in the abdomen and pelvis.    Normal-appearing rectum. No evidence of proctitis. No perirectal abscess.    Stool seen in the rectum.    Xray Abdomen 1 View Portable, IMMEDIATE (07.05.18 @ 15:23)   Comparison 3/20/2018  The bowel gas pattern is normal.  No free air is seen.  There is a good deal of stool throughout the colon unchanged.  Impression normal bowel gas pattern      Imaging Personally Reviewed:  [x] YES  [ ] NO    MEDICATIONS  (STANDING):  carvedilol 12.5 milliGRAM(s) Oral every 12 hours  dextrose 5%. 1000 milliLiter(s) (50 mL/Hr) IV Continuous <Continuous>  dextrose 50% Injectable 12.5 Gram(s) IV Push once  dextrose 50% Injectable 25 Gram(s) IV Push once  dextrose 50% Injectable 25 Gram(s) IV Push once  donepezil 10 milliGRAM(s) Oral at bedtime  furosemide    Tablet 20 milliGRAM(s) Oral daily  heparin  Injectable 5000 Unit(s) SubCutaneous every 12 hours  insulin glargine Injectable (LANTUS) 10 Unit(s) SubCutaneous at bedtime  insulin lispro (HumaLOG) corrective regimen sliding scale   SubCutaneous three times a day before meals  lactulose Syrup 20 Gram(s) Oral every 4 hours  losartan 100 milliGRAM(s) Oral daily  magnesium citrate Solution 300 milliLiter(s) Oral once  memantine 10 milliGRAM(s) Oral two times a day  oxybutynin 5 milliGRAM(s) Oral two times a day  pantoprazole    Tablet 40 milliGRAM(s) Oral before breakfast    MEDICATIONS  (PRN):  dextrose 40% Gel 15 Gram(s) Oral once PRN Blood Glucose LESS THAN 70 milliGRAM(s)/deciliter  glucagon  Injectable 1 milliGRAM(s) IntraMuscular once PRN Glucose LESS THAN 70 milligrams/deciliter      HEALTH ISSUES - PROBLEM Dx:  Constipation  CAD s/p CABG  Dementia  Afib  High cholesterol  Hypertension  Diabetes

## 2018-07-06 NOTE — CONSULT NOTE ADULT - ASSESSMENT
IMPRESSION: Rehab of 84 y/o  m ptn rehab  for  gd  debility  sp  fall     PRECAUTIONS: [  ] Cardiac  [  ] Respiratory  [  ] Seizures [  ] Contact Isolation  [  ] Droplet Isolation  [ x ] Other fall    Weight Bearing Status:     RECOMMENDATION:    Out of Bed to Chair     DVT/Decubiti Prophylaxis    REHAB PLAN:     [  xx ] Bedside P/T 3-5 times a week   [   ]   Bedside O/T  2-3 times a week             [   ] No Rehab Therapy Indicated                   [   ]  Speech Therapy   Conditioning/ROM                                    ADL  Bed Mobility                                               Conditioning/ROM  Transfers                                                     Bed Mobility  Sitting /Standing Balance                         Transfers                                        Gait Training                                               Sitting/Standing Balance  Stair Training [   ]Applicable                    Home equipment Eval                                                                        Splinting  [   ] Only      GOALS:   ADL   [ x ]   Independent                    Transfers  [x   ] Independent                          Ambulation  [  x ] Independent     [   x ] With device                            [   ]  CG                                                         [   ]  CG                                                                  [   ] CG                            [    ] Min A                                                   [   ] Min A                                                              [   ] Min  A          DISCHARGE PLAN:   [   ]  Good candidate for Intensive Rehabilitation/Hospital based-[facility]                                             Will tolerate 3hrs Intensive Rehab Daily                                       [ xx   ]  Short Term Rehab in Skilled Nursing Facility may  need  hc  vs  LTC                                       [    ]  Home with Outpatient or VN services                                         [    ]  Possible Candidate for Intensive Hospital based Rehab

## 2018-07-06 NOTE — PHYSICAL THERAPY INITIAL EVALUATION ADULT - GENERAL OBSERVATIONS, REHAB EVAL
9: 30 to 9: 50. Chart reviewed, pt received supine, HOB slightly elevated, NAD, c/o stomach pain, RN aware

## 2018-07-06 NOTE — PROGRESS NOTE ADULT - ASSESSMENT
Patient is a 86 y/o Male with PMHx of Dementia, Afib, High cholesterol, Hypertension, NIDDM, CAD s/p CABG brought in from home by Daughter with complaints of worsening Constipation. Patient's daughter states for the past 5 1/2 years, patient has had intermittent issues with constipation with associated Syncopal/near syncopal events. He has never been evaluated by GI, not sure a prior colonoscopy, no hematochezia, but has been on multiple therapies including Miralax daily. For the past few weeks however symptoms have been very difficult for her to manage at home anymore. Patient due to Dementia a could not provide history except "I don't feel well". X-ray showed Stool filled colon with no evidence of obstruction.    Assessment and Plan:    1. Constipation:  No evidence of obstruction Continue Tap water enemas and Lactulose/Mag Citrate.  GI consulted. Last colonoscopy unknown.      2. NIDDM:  Monitor Finger sticks. Hold Glipizide.  Continue Correctional dose Insulin & Lantus.      3. CAD s/p CABG  Continue Coreg. Not on Aspirin or Statin.    4. Dementia:   On Aricept and memantine.      DVT prophylaxis: Heparin.  Disposition: Pending PT eval.

## 2018-07-06 NOTE — PROGRESS NOTE ADULT - ASSESSMENT
Patient is an 84 y/o gentleman with PMHx of A-Fib, CAD ( previous CABG), HTN, DM, HLD, and dementia that presents from home for Syncopal episodes that are brought on by constipation . When attempting to toilet patient becomes syncopal. As per note patient has been on Miralax and many available over the counter agents. Patient currently feels well. Patient was placed on Lactulose and has had bowel movement. He currently denies any complaints but appears to be a poor historian overall. Imaging done during admission notable for stool throughout the bowel. Patient is an 84 y/o gentleman with PMHx of A-Fib, CAD ( previous CABG), HTN, DM, HLD, and dementia that presents from home for Syncopal episodes that are brought on by constipation . When attempting to toilet patient becomes syncopal. As per note patient has been on Miralax and many available over the counter agents. Patient currently feels well. Patient was placed on Lactulose and has had bowel movement. He currently denies any complaints but appears to be a poor historian overall. Imaging done during admission notable for stool throughout the bowel. Patient is having bowel movements currently. Chronic constipation likely from age and comorbidities along with medication.     Chronic constipation   - Would stop Lactulose  - Miralax four times daily  - Dulcolax nightly   - Senna  - Consider Thyroid studies Patient is an 86 y/o gentleman with PMHx of A-Fib, CAD ( previous CABG), HTN, DM, HLD, and dementia that presents from home for Syncopal episodes that are brought on by constipation . When attempting to toilet patient becomes syncopal. As per note patient has been on Miralax and many available over the counter agents. Patient currently feels well. Patient was placed on Lactulose and has had bowel movement. He currently denies any complaints but appears to be a poor historian overall. Imaging done during admission notable for stool throughout the bowel. Patient is having bowel movements currently. Chronic constipation likely from age and comorbidities along with medication.     Chronic constipation   - Would stop Lactulose  - Miralax four times daily  - Dulcolax one pill daily for two days  - Senna nightly   - Consider Thyroid studies   - Continue Tap water enemas three times daily Patient is an 86 y/o gentleman with PMHx of A-Fib, CAD ( previous CABG), HTN, DM, HLD, and dementia that presents from home for Syncopal episodes that are brought on by constipation . When attempting to toilet patient becomes syncopal. As per note patient has been on Miralax and many available over the counter agents. Patient currently feels well. Patient was placed on Lactulose and has had bowel movement. He currently denies any complaints but appears to be a poor historian overall. Imaging done during admission notable for stool throughout the bowel. Patient is having bowel movements currently. Chronic constipation likely from age and comorbidities along with medication.     Chronic constipation   - Would stop Lactulose  - Miralax four times daily  - Dulcolax one pill daily for two days  - Senna  2 pills nightly   - Consider Thyroid studies   - Continue Tap water enemas bid until patient is having soft stools

## 2018-07-06 NOTE — CONSULT NOTE ADULT - SUBJECTIVE AND OBJECTIVE BOX
HPI:  Patient is a 86 y/o Male with PMHx of Dementia, Afib, High cholesterol, Hypertension, NIDDM, CAD s/p CABG brought in from home by Daughter with complaints of worsening Constipation. Patient's daughter states for the past 5 1/2 years, patient has had intermittent issues with constipation with associated Syncopal/near syncopal events. He has never been evaluated by GI, not sure a prior colonoscopy, no hematochezia, but has been on multiple therapies including Miralax daily. For the past few weeks however symptoms have been very difficult for her to manage at home anymore. Patient due to Dementia a could not provide history except "I don't feel well". X-ray showed Stool filled colon with no evidence of obstruction.     PTN  REFERRED TO ACUTE  REHAB  FOR  EVAL AND  TX   PAST MEDICAL & SURGICAL HISTORY:  Dementia  Afib  High cholesterol  Hypertension  Diabetes  No significant past surgical history      Hospital Course:    TODAY'S SUBJECTIVE & REVIEW OF SYMPTOMS:     Constitutional WNL   Cardio WNL   Resp WNL   GI WNL  Heme WNL  Endo WNL  Skin WNL  MSK WNL  Neuro WNL  Cognitive WNL  Psych WNL      MEDICATIONS  (STANDING):  carvedilol 12.5 milliGRAM(s) Oral every 12 hours  dextrose 5%. 1000 milliLiter(s) (50 mL/Hr) IV Continuous <Continuous>  dextrose 50% Injectable 12.5 Gram(s) IV Push once  dextrose 50% Injectable 25 Gram(s) IV Push once  dextrose 50% Injectable 25 Gram(s) IV Push once  donepezil 10 milliGRAM(s) Oral at bedtime  furosemide    Tablet 20 milliGRAM(s) Oral daily  heparin  Injectable 5000 Unit(s) SubCutaneous every 12 hours  insulin glargine Injectable (LANTUS) 10 Unit(s) SubCutaneous at bedtime  insulin lispro (HumaLOG) corrective regimen sliding scale   SubCutaneous three times a day before meals  lactulose Syrup 20 Gram(s) Oral every 4 hours  losartan 100 milliGRAM(s) Oral daily  memantine 10 milliGRAM(s) Oral two times a day  oxybutynin 5 milliGRAM(s) Oral two times a day  pantoprazole    Tablet 40 milliGRAM(s) Oral before breakfast    MEDICATIONS  (PRN):  dextrose 40% Gel 15 Gram(s) Oral once PRN Blood Glucose LESS THAN 70 milliGRAM(s)/deciliter  glucagon  Injectable 1 milliGRAM(s) IntraMuscular once PRN Glucose LESS THAN 70 milligrams/deciliter      FAMILY HISTORY:  No pertinent family history in first degree relatives      Allergies    Avandia (Unknown)  Lipitor (Unknown)  metformin (Unknown)    Intolerances        SOCIAL HISTORY:    [  ] Etoh  [  ] Smoking  [  ] Substance abuse     Home Environment:  [  ] Home Alone  [ x ] Lives with Family  [  ] Home Health Aid    Dwelling:  [  ] Apartment  [ x ] Private House  [  ] Adult Home  [  ] Skilled Nursing Facility      [  ] Short Term  [  ] Long Term  [x  ] Stairs       Elevator [  ]    FUNCTIONAL STATUS PTA: (Check all that apply)  Ambulation: [   ]Independent    [  ] Dependent     [  ] Non-Ambulatory  Assistive Device: [  ] SA Cane  [  ]  Q Cane  [  ] Walker  [  ]  Wheelchair  ADL : [  ] Independent  [  ]  Dependent       Vital Signs Last 24 Hrs  T(C): 36.3 (06 Jul 2018 05:28), Max: 36.7 (05 Jul 2018 22:30)  T(F): 97.4 (06 Jul 2018 05:28), Max: 98 (05 Jul 2018 22:30)  HR: 63 (06 Jul 2018 05:28) (54 - 64)  BP: 140/79 (06 Jul 2018 05:28) (105/51 - 165/72)  BP(mean): --  RR: 16 (06 Jul 2018 05:28) (16 - 19)  SpO2: 97% (05 Jul 2018 17:39) (97% - 98%)      PHYSICAL EXAM: Alert & Oriented X 2 confused  but NAD  GENERAL: NAD, well-groomed, well-developed  HEAD:  Atraumatic, Normocephalic  EYES: EOMI, PERRLA, conjunctiva and sclera clear  NECK: Supple, No JVD, Normal thyroid  CHEST/LUNG: Clear to percussion bilaterally; No rales, rhonchi, wheezing, or rubs  HEART: Regular rate and rhythm; No murmurs, rubs, or gallops  ABDOMEN: Soft, Nontender, Nondistended; Bowel sounds present  EXTREMITIES:  2+ Peripheral Pulses, No clubbing, cyanosis, or edema    NERVOUS SYSTEM:  Cranial Nerves 2-12 intact [ x ] Abnormal  [  ]  ROM: WFL all extremities [ x ]  Abnormal [  ]  Motor Strength: WFL all extremities  [  ]  Abnormal [ x ]  Sensation: intact to light touch [ x ] Abnormal [  ]  Reflexes: Symmetric [  x]  Abnormal [  ]    FUNCTIONAL STATUS:  Bed Mobility: Independent [  ]  Supervision [  ]  Needs Assistance [ x ]  N/A [  ]  Transfers: Independent [  ]  Supervision [  ]  Needs Assistance [x  ]  N/A [  ]   Ambulation: Independent [  ]  Supervision [  ]  Needs Assistance [ x ]  N/A [  ]  ADL: Independent [ x] Requires Assistance [  ] N/A [  ]    see  pt  ie  notes  LABS:                        11.3   4.88  )-----------( 200      ( 06 Jul 2018 05:47 )             35.0     07-06    142  |  104  |  21<H>  ----------------------------<  111<H>  4.1   |  27  |  1.1    Ca    9.2      06 Jul 2018 05:47  Phos  3.5     07-06  Mg     2.0     07-06    TPro  5.9<L>  /  Alb  3.7  /  TBili  0.4  /  DBili  x   /  AST  11  /  ALT  8   /  AlkPhos  65  07-06          RADIOLOGY & ADDITIONAL STUDIES:    Assesment:

## 2018-07-07 ENCOUNTER — TRANSCRIPTION ENCOUNTER (OUTPATIENT)
Age: 83
End: 2018-07-07

## 2018-07-07 VITALS — WEIGHT: 229.94 LBS

## 2018-07-07 LAB
ALBUMIN SERPL ELPH-MCNC: 3.4 G/DL — LOW (ref 3.5–5.2)
ALP SERPL-CCNC: 66 U/L — SIGNIFICANT CHANGE UP (ref 30–115)
ALT FLD-CCNC: 8 U/L — SIGNIFICANT CHANGE UP (ref 0–41)
ANION GAP SERPL CALC-SCNC: 11 MMOL/L — SIGNIFICANT CHANGE UP (ref 7–14)
AST SERPL-CCNC: 13 U/L — SIGNIFICANT CHANGE UP (ref 0–41)
BILIRUB SERPL-MCNC: 0.4 MG/DL — SIGNIFICANT CHANGE UP (ref 0.2–1.2)
BUN SERPL-MCNC: 18 MG/DL — SIGNIFICANT CHANGE UP (ref 10–20)
CALCIUM SERPL-MCNC: 8.8 MG/DL — SIGNIFICANT CHANGE UP (ref 8.5–10.1)
CHLORIDE SERPL-SCNC: 103 MMOL/L — SIGNIFICANT CHANGE UP (ref 98–110)
CO2 SERPL-SCNC: 28 MMOL/L — SIGNIFICANT CHANGE UP (ref 17–32)
CREAT SERPL-MCNC: 1.3 MG/DL — SIGNIFICANT CHANGE UP (ref 0.7–1.5)
GLUCOSE SERPL-MCNC: 137 MG/DL — HIGH (ref 70–99)
HCT VFR BLD CALC: 34.6 % — LOW (ref 42–52)
HGB BLD-MCNC: 11.1 G/DL — LOW (ref 14–18)
MAGNESIUM SERPL-MCNC: 2.3 MG/DL — SIGNIFICANT CHANGE UP (ref 1.8–2.4)
MCHC RBC-ENTMCNC: 27.8 PG — SIGNIFICANT CHANGE UP (ref 27–31)
MCHC RBC-ENTMCNC: 32.1 G/DL — SIGNIFICANT CHANGE UP (ref 32–37)
MCV RBC AUTO: 86.5 FL — SIGNIFICANT CHANGE UP (ref 80–94)
NRBC # BLD: 0 /100 WBCS — SIGNIFICANT CHANGE UP (ref 0–0)
PHOSPHATE SERPL-MCNC: 3.7 MG/DL — SIGNIFICANT CHANGE UP (ref 2.1–4.9)
PLATELET # BLD AUTO: 177 K/UL — SIGNIFICANT CHANGE UP (ref 130–400)
POTASSIUM SERPL-MCNC: 4.5 MMOL/L — SIGNIFICANT CHANGE UP (ref 3.5–5)
POTASSIUM SERPL-SCNC: 4.5 MMOL/L — SIGNIFICANT CHANGE UP (ref 3.5–5)
PROT SERPL-MCNC: 5.5 G/DL — LOW (ref 6–8)
RBC # BLD: 4 M/UL — LOW (ref 4.7–6.1)
RBC # FLD: 14.6 % — HIGH (ref 11.5–14.5)
SODIUM SERPL-SCNC: 142 MMOL/L — SIGNIFICANT CHANGE UP (ref 135–146)
TSH SERPL-MCNC: 2.87 UIU/ML — SIGNIFICANT CHANGE UP (ref 0.27–4.2)
WBC # BLD: 5.34 K/UL — SIGNIFICANT CHANGE UP (ref 4.8–10.8)
WBC # FLD AUTO: 5.34 K/UL — SIGNIFICANT CHANGE UP (ref 4.8–10.8)

## 2018-07-07 PROCEDURE — 93970 EXTREMITY STUDY: CPT | Mod: 26

## 2018-07-07 RX ORDER — POLYETHYLENE GLYCOL 3350 17 G/17G
17 POWDER, FOR SOLUTION ORAL
Qty: 0 | Refills: 0 | COMMUNITY
Start: 2018-07-07

## 2018-07-07 RX ADMIN — LOSARTAN POTASSIUM 100 MILLIGRAM(S): 100 TABLET, FILM COATED ORAL at 05:02

## 2018-07-07 RX ADMIN — PANTOPRAZOLE SODIUM 40 MILLIGRAM(S): 20 TABLET, DELAYED RELEASE ORAL at 07:38

## 2018-07-07 RX ADMIN — CARVEDILOL PHOSPHATE 12.5 MILLIGRAM(S): 80 CAPSULE, EXTENDED RELEASE ORAL at 05:01

## 2018-07-07 RX ADMIN — Medication 20 MILLIGRAM(S): at 05:01

## 2018-07-07 RX ADMIN — HEPARIN SODIUM 5000 UNIT(S): 5000 INJECTION INTRAVENOUS; SUBCUTANEOUS at 05:01

## 2018-07-07 RX ADMIN — Medication 5 MILLIGRAM(S): at 11:09

## 2018-07-07 RX ADMIN — POLYETHYLENE GLYCOL 3350 17 GRAM(S): 17 POWDER, FOR SOLUTION ORAL at 05:03

## 2018-07-07 RX ADMIN — MEMANTINE HYDROCHLORIDE 10 MILLIGRAM(S): 10 TABLET ORAL at 05:02

## 2018-07-07 RX ADMIN — Medication 5 MILLIGRAM(S): at 05:02

## 2018-07-07 RX ADMIN — POLYETHYLENE GLYCOL 3350 17 GRAM(S): 17 POWDER, FOR SOLUTION ORAL at 11:09

## 2018-07-07 NOTE — PROGRESS NOTE ADULT - ASSESSMENT
Patient is a 86 y/o Male with PMHx of Dementia, Afib, High cholesterol, Hypertension, NIDDM, CAD s/p CABG brought in from home by Daughter with complaints of worsening Constipation. Patient's daughter states for the past 5 1/2 years, patient has had intermittent issues with constipation with associated Syncopal/near syncopal events. He has never been evaluated by GI, not sure a prior colonoscopy, no hematochezia, but has been on multiple therapies including Miralax daily. For the past few weeks however symptoms have been very difficult for her to manage at home anymore. Patient due to Dementia a could not provide history except "I don't feel well". X-ray showed Stool filled colon with no evidence of obstruction.    Assessment and Plan:    1. Constipation:  No evidence of obstruction. Now passing soft stools.   Stop Tap water enemas. Continue Dulcolax, Miralax and Senna.  GI consulted: appreciate input. Recommendations noted.       2. NIDDM:  Monitor Finger sticks. Hold Glipizide.  Continue Correctional dose Insulin & Lantus.      3. CAD s/p CABG  Continue Coreg. Not on Aspirin or Statin.    4. Dementia:   On Aricept and memantine.      DVT prophylaxis: Heparin.  Disposition: STR recommended.

## 2018-07-07 NOTE — DISCHARGE NOTE ADULT - PATIENT PORTAL LINK FT
You can access the [facility] Patient Portal, offered by [facility], by registering with the following website: [email or web address]

## 2018-07-07 NOTE — DISCHARGE NOTE ADULT - MEDICATION SUMMARY - MEDICATIONS TO TAKE
I will START or STAY ON the medications listed below when I get home from the hospital:    losartan 100 mg oral tablet  -- 1 tab(s) by mouth once a day  -- Indication: For Hypertension    glipiZIDE 10 mg oral tablet, extended release  -- 2 tab(s) by mouth 2 times a day  -- Indication: For Diabetes    carvedilol 12.5 mg oral tablet  -- 1 tab(s) by mouth 2 times a day  -- Indication: For Hypertension    donepezil 10 mg oral tablet  -- orally once a day  -- Indication: For Dementia    polyethylene glycol 3350 oral powder for reconstitution  -- 17 gram(s) by mouth once a day, As Needed  -- Indication: For Constipation    memantine 10 mg oral tablet  -- 1 tab(s) by mouth 2 times a day  -- Indication: For Dementia    omeprazole 20 mg oral delayed release tablet  -- 1 tab(s) by mouth 2 times a day  -- Indication: For GERD    oxybutynin 10 mg/24 hr oral tablet, extended release  -- orally once a day (at bedtime)  -- Indication: For Overactive Bladder

## 2018-07-07 NOTE — DISCHARGE NOTE ADULT - HOSPITAL COURSE
Patient is a 84 y/o Male with PMHx of Dementia, Afib, High cholesterol, Hypertension, NIDDM, CAD s/p CABG brought in from home by Daughter with complaints of worsening Constipation. Patient's daughter states for the past 5 1/2 years, patient has had intermittent issues with constipation with associated Syncopal/near syncopal events. He has never been evaluated by GI, not sure a prior colonoscopy, no hematochezia, but has been on multiple therapies including Miralax daily. For the past few weeks however symptoms have been very difficult for her to manage at home anymore. Patient due to Dementia a could not provide history except "I don't feel well". X-ray showed Stool filled colon with no evidence of obstruction.    Assessment and Plan:    1. Constipation:  No evidence of obstruction. Now passing soft stools.   Stop Tap water enemas. Continue Dulcolax, Miralax and Senna.  GI consulted: appreciate input. Recommendations noted.       2. NIDDM:  Monitor Finger sticks. Hold Glipizide.  Continue Correctional dose Insulin & Lantus.      3. CAD s/p CABG  Continue Coreg. Not on Aspirin or Statin.    4. Dementia:   On Aricept and memantine.

## 2018-07-07 NOTE — PROGRESS NOTE ADULT - SUBJECTIVE AND OBJECTIVE BOX
DIONNE CURIEL  85y  Male    Patient is a 85y old  Male who presents with a chief complaint of c/o constipation (05 Jul 2018 20:38)      INTERVAL HPI/OVERNIGHT EVENTS:  No interval events. Patient has no complaints. Moving his bowels.      REVIEW OF SYSTEMS: Unable to fully obtain due to Dementia.  CONSTITUTIONAL: No fever, weight loss, or fatigue  EYES: No eye pain, visual disturbances, or discharge  ENMT:  No difficulty hearing, tinnitus, vertigo; No sinus or throat pain  RESPIRATORY: No cough, wheezing, chills or hemoptysis; No shortness of breath  CARDIOVASCULAR: No chest pain, palpitations, dizziness, or leg swelling  GASTROINTESTINAL: No abdominal or epigastric pain. No nausea, vomiting, or hematemesis; No diarrhea or constipation. No melena or hematochezia.  GENITOURINARY: No dysuria, frequency, hematuria, or incontinence  NEUROLOGICAL: No headaches, loss of strength, numbness, or tremors  MUSCULOSKELETAL: No joint pain or swelling; No muscle, back, or extremity pain      VITALS:  T(C): 35.8 (07 Jul 2018 05:27), Max: 37.6 (06 Jul 2018 22:08)  T(F): 96.4 (07 Jul 2018 05:27), Max: 99.6 (06 Jul 2018 22:08)  HR: 59 (07 Jul 2018 05:27) (57 - 64)  BP: 139/63 (07 Jul 2018 05:27) (109/55 - 146/66)  BP(mean): --  RR: 16 (07 Jul 2018 05:27) (16 - 16)  SpO2: --      CAPILLARY BLOOD GLUCOSE  194 (07 Jul 2018 11:25)  131 (07 Jul 2018 07:22)  117 (06 Jul 2018 22:08)  187 (06 Jul 2018 16:43)        PHYSICAL EXAM:  GENERAL: NAD  HEAD:  Atraumatic, Normocephalic  EYES: conjunctiva and sclera clear  ENMT: enlargement; Moist mucous membranes  NECK: Supple, Normal thyroid  NERVOUS SYSTEM:  Alert & Oriented X 1; Motor Strength 5/5 B/L upper and lower extremities  CHEST/LUNG: Clear to percussion bilaterally; No rales, rhonchi, wheezing, or rubs  HEART: Regular rate and rhythm; No murmurs, rubs, or gallops  ABDOMEN: Soft, Nontender, Nondistended; Bowel sounds present  EXTREMITIES:  2+ Peripheral Pulses, No clubbing, cyanosis, Bipedal edema  LYMPH: No lymphadenopathy noted  SKIN: Please see nursing assessment.    Consultant(s) Notes Reviewed:  [x ] YES  [ ] NO  Care Discussed with Consultants/Other Providers [ x] YES  [ ] NO    LABS:                        11.3   4.88  )-----------( 200      ( 06 Jul 2018 05:47 )             35.0     07-06    142  |  104  |  21<H>  ----------------------------<  111<H>  4.1   |  27  |  1.1    Ca    9.2      06 Jul 2018 05:47  Phos  3.5     07-06  Mg     2.0     07-06    TPro  5.9<L>  /  Alb  3.7  /  TBili  0.4  /  DBili  x   /  AST  11  /  ALT  8   /  AlkPhos  65  07-06      MICROBIOLOGY: None      RADIOLOGY & ADDITIONAL TESTS:  CT Abdomen and Pelvis w/ Oral Cont and w/ IV Cont (03.14.18 @ 15:04)   No acute abnormality in the abdomen and pelvis.    Normal-appearing rectum. No evidence of proctitis. No perirectal abscess.    Stool seen in the rectum.    X-ray Abdomen 1 View Portable, IMMEDIATE (07.05.18 @ 15:23)   Comparison 3/20/2018  The bowel gas pattern is normal.  No free air is seen.  There is a good deal of stool throughout the colon unchanged.  Impression normal bowel gas pattern    VA Duplex Lower Ext Vein Scan, Bilat (07.07.18 @ 10:33)  No evidence of deep venous thrombosis or superficial thrombophlebitis in   the bilateral lower extremities.      Imaging Personally Reviewed:  [x] YES  [ ] NO    MEDICATIONS  (STANDING):  bisacodyl 5 milliGRAM(s) Oral daily  carvedilol 12.5 milliGRAM(s) Oral every 12 hours  dextrose 5%. 1000 milliLiter(s) (50 mL/Hr) IV Continuous <Continuous>  dextrose 50% Injectable 12.5 Gram(s) IV Push once  dextrose 50% Injectable 25 Gram(s) IV Push once  dextrose 50% Injectable 25 Gram(s) IV Push once  donepezil 10 milliGRAM(s) Oral at bedtime  furosemide    Tablet 20 milliGRAM(s) Oral daily  heparin  Injectable 5000 Unit(s) SubCutaneous every 12 hours  insulin glargine Injectable (LANTUS) 10 Unit(s) SubCutaneous at bedtime  insulin lispro (HumaLOG) corrective regimen sliding scale   SubCutaneous three times a day before meals  losartan 100 milliGRAM(s) Oral daily  memantine 10 milliGRAM(s) Oral two times a day  oxybutynin 5 milliGRAM(s) Oral two times a day  pantoprazole    Tablet 40 milliGRAM(s) Oral before breakfast  polyethylene glycol 3350 17 Gram(s) Oral <User Schedule>  senna 2 Tablet(s) Oral at bedtime    MEDICATIONS  (PRN):  dextrose 40% Gel 15 Gram(s) Oral once PRN Blood Glucose LESS THAN 70 milliGRAM(s)/deciliter  glucagon  Injectable 1 milliGRAM(s) IntraMuscular once PRN Glucose LESS THAN 70 milligrams/deciliter        HEALTH ISSUES - PROBLEM Dx:  Constipation  CAD s/p CABG  Dementia  Afib  High cholesterol  Hypertension  Diabetes

## 2018-07-07 NOTE — DISCHARGE NOTE ADULT - CARE PLAN
Principal Discharge DX:	Constipation  Goal:	Regulate bowel movements  Assessment and plan of treatment:	Continue Miralax as needed. Follow up with PMD or Gastroenterologist as needed.

## 2018-07-07 NOTE — DISCHARGE NOTE ADULT - CARE PROVIDERS DIRECT ADDRESSES
,DirectAddress_Unknown,[email or web address]

## 2018-07-07 NOTE — DISCHARGE NOTE ADULT - PLAN OF CARE
Regulate bowel movements Continue Miralax as needed. Follow up with PMD or Gastroenterologist as needed.

## 2018-07-07 NOTE — DISCHARGE NOTE ADULT - CARE PROVIDER_API CALL
Geremias Acuña), Internal Medicine  [address] [address]  Phone: [phone]  Fax: [phone]    Cortez Sanders), Gastroenterology; Internal Medicine  [address] [place] [id number]  Phone: [phone]  Fax: [phone]

## 2018-07-07 NOTE — DISCHARGE NOTE ADULT - OTHER SIGNIFICANT FINDINGS
LABS:                        11.3   4.88  )-----------( 200      ( 06 Jul 2018 05:47 )             35.0     07-06    142  |  104  |  21<H>  ----------------------------<  111<H>  4.1   |  27  |  1.1    Ca    9.2      06 Jul 2018 05:47  Phos  3.5     07-06  Mg     2.0     07-06    TPro  5.9<L>  /  Alb  3.7  /  TBili  0.4  /  DBili  x   /  AST  11  /  ALT  8   /  AlkPhos  65  07-06      MICROBIOLOGY: None      RADIOLOGY & ADDITIONAL TESTS:  CT Abdomen and Pelvis w/ Oral Cont and w/ IV Cont (03.14.18 @ 15:04)   No acute abnormality in the abdomen and pelvis.    Normal-appearing rectum. No evidence of proctitis. No perirectal abscess.    Stool seen in the rectum.    X-ray Abdomen 1 View Portable, IMMEDIATE (07.05.18 @ 15:23)   Comparison 3/20/2018  The bowel gas pattern is normal.  No free air is seen.  There is a good deal of stool throughout the colon unchanged.  Impression normal bowel gas pattern    VA Duplex Lower Ext Vein Scan, Bilat (07.07.18 @ 10:33)  No evidence of deep venous thrombosis or superficial thrombophlebitis in   the bilateral lower extremities.

## 2018-07-13 DIAGNOSIS — I25.10 ATHEROSCLEROTIC HEART DISEASE OF NATIVE CORONARY ARTERY WITHOUT ANGINA PECTORIS: ICD-10-CM

## 2018-07-13 DIAGNOSIS — K59.00 CONSTIPATION, UNSPECIFIED: ICD-10-CM

## 2018-07-13 DIAGNOSIS — I10 ESSENTIAL (PRIMARY) HYPERTENSION: ICD-10-CM

## 2018-07-13 DIAGNOSIS — Z95.1 PRESENCE OF AORTOCORONARY BYPASS GRAFT: ICD-10-CM

## 2018-07-13 DIAGNOSIS — E11.9 TYPE 2 DIABETES MELLITUS WITHOUT COMPLICATIONS: ICD-10-CM

## 2018-07-13 DIAGNOSIS — Z79.84 LONG TERM (CURRENT) USE OF ORAL HYPOGLYCEMIC DRUGS: ICD-10-CM

## 2018-07-13 DIAGNOSIS — E78.5 HYPERLIPIDEMIA, UNSPECIFIED: ICD-10-CM

## 2018-07-13 DIAGNOSIS — F03.90 UNSPECIFIED DEMENTIA WITHOUT BEHAVIORAL DISTURBANCE: ICD-10-CM

## 2018-07-13 DIAGNOSIS — I48.91 UNSPECIFIED ATRIAL FIBRILLATION: ICD-10-CM

## 2018-07-26 PROBLEM — E11.9 TYPE 2 DIABETES MELLITUS WITHOUT COMPLICATIONS: Chronic | Status: ACTIVE | Noted: 2018-03-14

## 2018-07-26 PROBLEM — I10 ESSENTIAL (PRIMARY) HYPERTENSION: Chronic | Status: ACTIVE | Noted: 2018-03-14

## 2018-07-26 PROBLEM — E78.00 PURE HYPERCHOLESTEROLEMIA, UNSPECIFIED: Chronic | Status: ACTIVE | Noted: 2018-03-14

## 2018-09-03 PROBLEM — R60.0 BILATERAL EDEMA OF LOWER EXTREMITY: Status: ACTIVE | Noted: 2017-04-03

## 2018-09-07 ENCOUNTER — APPOINTMENT (OUTPATIENT)
Dept: CARDIOLOGY | Facility: CLINIC | Age: 83
End: 2018-09-07

## 2018-09-24 ENCOUNTER — INPATIENT (INPATIENT)
Facility: HOSPITAL | Age: 83
LOS: 3 days | Discharge: HOME | End: 2018-09-28
Attending: HOSPITALIST | Admitting: HOSPITALIST

## 2018-09-24 VITALS
HEART RATE: 65 BPM | HEIGHT: 67 IN | TEMPERATURE: 101 F | SYSTOLIC BLOOD PRESSURE: 139 MMHG | OXYGEN SATURATION: 98 % | RESPIRATION RATE: 18 BRPM | WEIGHT: 220.02 LBS | DIASTOLIC BLOOD PRESSURE: 60 MMHG

## 2018-09-24 LAB
ALBUMIN SERPL ELPH-MCNC: 3.8 G/DL — SIGNIFICANT CHANGE UP (ref 3.5–5.2)
ALP SERPL-CCNC: 58 U/L — SIGNIFICANT CHANGE UP (ref 30–115)
ALT FLD-CCNC: 10 U/L — SIGNIFICANT CHANGE UP (ref 0–41)
ANION GAP SERPL CALC-SCNC: 13 MMOL/L — SIGNIFICANT CHANGE UP (ref 7–14)
APPEARANCE UR: CLEAR — SIGNIFICANT CHANGE UP
AST SERPL-CCNC: 34 U/L — SIGNIFICANT CHANGE UP (ref 0–41)
BASOPHILS # BLD AUTO: 0.03 K/UL — SIGNIFICANT CHANGE UP (ref 0–0.2)
BASOPHILS NFR BLD AUTO: 0.2 % — SIGNIFICANT CHANGE UP (ref 0–1)
BILIRUB SERPL-MCNC: 0.9 MG/DL — SIGNIFICANT CHANGE UP (ref 0.2–1.2)
BILIRUB UR-MCNC: NEGATIVE — SIGNIFICANT CHANGE UP
BUN SERPL-MCNC: 25 MG/DL — HIGH (ref 10–20)
CALCIUM SERPL-MCNC: 9 MG/DL — SIGNIFICANT CHANGE UP (ref 8.5–10.1)
CHLORIDE SERPL-SCNC: 102 MMOL/L — SIGNIFICANT CHANGE UP (ref 98–110)
CO2 SERPL-SCNC: 25 MMOL/L — SIGNIFICANT CHANGE UP (ref 17–32)
COLOR SPEC: YELLOW — SIGNIFICANT CHANGE UP
CREAT SERPL-MCNC: 1.4 MG/DL — SIGNIFICANT CHANGE UP (ref 0.7–1.5)
DIFF PNL FLD: ABNORMAL
EOSINOPHIL # BLD AUTO: 0 K/UL — SIGNIFICANT CHANGE UP (ref 0–0.7)
EOSINOPHIL NFR BLD AUTO: 0 % — SIGNIFICANT CHANGE UP (ref 0–8)
GAS PNL BLDV: SIGNIFICANT CHANGE UP
GLUCOSE BLDC GLUCOMTR-MCNC: 130 MG/DL — HIGH (ref 70–99)
GLUCOSE BLDC GLUCOMTR-MCNC: 151 MG/DL — HIGH (ref 70–99)
GLUCOSE BLDC GLUCOMTR-MCNC: 163 MG/DL — HIGH (ref 70–99)
GLUCOSE BLDC GLUCOMTR-MCNC: 181 MG/DL — HIGH (ref 70–99)
GLUCOSE SERPL-MCNC: 160 MG/DL — HIGH (ref 70–99)
GLUCOSE UR QL: NEGATIVE MG/DL — SIGNIFICANT CHANGE UP
HCT VFR BLD CALC: 33.7 % — LOW (ref 42–52)
HGB BLD-MCNC: 11.1 G/DL — LOW (ref 14–18)
IMM GRANULOCYTES NFR BLD AUTO: 0.6 % — HIGH (ref 0.1–0.3)
KETONES UR-MCNC: NEGATIVE — SIGNIFICANT CHANGE UP
LEUKOCYTE ESTERASE UR-ACNC: SIGNIFICANT CHANGE UP
LYMPHOCYTES # BLD AUTO: 0.56 K/UL — LOW (ref 1.2–3.4)
LYMPHOCYTES # BLD AUTO: 4.6 % — LOW (ref 20.5–51.1)
MAGNESIUM SERPL-MCNC: 2 MG/DL — SIGNIFICANT CHANGE UP (ref 1.8–2.4)
MCHC RBC-ENTMCNC: 28.7 PG — SIGNIFICANT CHANGE UP (ref 27–31)
MCHC RBC-ENTMCNC: 32.9 G/DL — SIGNIFICANT CHANGE UP (ref 32–37)
MCV RBC AUTO: 87.1 FL — SIGNIFICANT CHANGE UP (ref 80–94)
MONOCYTES # BLD AUTO: 0.74 K/UL — HIGH (ref 0.1–0.6)
MONOCYTES NFR BLD AUTO: 6.1 % — SIGNIFICANT CHANGE UP (ref 1.7–9.3)
NEUTROPHILS # BLD AUTO: 10.83 K/UL — HIGH (ref 1.4–6.5)
NEUTROPHILS NFR BLD AUTO: 88.5 % — HIGH (ref 42.2–75.2)
NITRITE UR-MCNC: POSITIVE
PH UR: 5.5 — SIGNIFICANT CHANGE UP (ref 5–8)
PLATELET # BLD AUTO: 149 K/UL — SIGNIFICANT CHANGE UP (ref 130–400)
POTASSIUM SERPL-MCNC: 4.9 MMOL/L — SIGNIFICANT CHANGE UP (ref 3.5–5)
POTASSIUM SERPL-SCNC: 4.9 MMOL/L — SIGNIFICANT CHANGE UP (ref 3.5–5)
PROT SERPL-MCNC: 6.3 G/DL — SIGNIFICANT CHANGE UP (ref 6–8)
PROT UR-MCNC: 30 MG/DL
RBC # BLD: 3.87 M/UL — LOW (ref 4.7–6.1)
RBC # FLD: 14.9 % — HIGH (ref 11.5–14.5)
SODIUM SERPL-SCNC: 140 MMOL/L — SIGNIFICANT CHANGE UP (ref 135–146)
SP GR SPEC: 1.02 — SIGNIFICANT CHANGE UP (ref 1.01–1.03)
TROPONIN T SERPL-MCNC: 0.03 NG/ML — CRITICAL HIGH
TROPONIN T SERPL-MCNC: 0.04 NG/ML — CRITICAL HIGH
UROBILINOGEN FLD QL: 0.2 MG/DL — SIGNIFICANT CHANGE UP (ref 0.2–0.2)
WBC # BLD: 12.23 K/UL — HIGH (ref 4.8–10.8)
WBC # FLD AUTO: 12.23 K/UL — HIGH (ref 4.8–10.8)

## 2018-09-24 RX ORDER — PANTOPRAZOLE SODIUM 20 MG/1
40 TABLET, DELAYED RELEASE ORAL
Qty: 0 | Refills: 0 | Status: DISCONTINUED | OUTPATIENT
Start: 2018-09-24 | End: 2018-09-28

## 2018-09-24 RX ORDER — CEFTRIAXONE 500 MG/1
INJECTION, POWDER, FOR SOLUTION INTRAMUSCULAR; INTRAVENOUS
Qty: 0 | Refills: 0 | Status: DISCONTINUED | OUTPATIENT
Start: 2018-09-24 | End: 2018-09-24

## 2018-09-24 RX ORDER — SODIUM CHLORIDE 9 MG/ML
1000 INJECTION, SOLUTION INTRAVENOUS
Qty: 0 | Refills: 0 | Status: DISCONTINUED | OUTPATIENT
Start: 2018-09-24 | End: 2018-09-28

## 2018-09-24 RX ORDER — LOSARTAN POTASSIUM 100 MG/1
100 TABLET, FILM COATED ORAL DAILY
Qty: 0 | Refills: 0 | Status: DISCONTINUED | OUTPATIENT
Start: 2018-09-24 | End: 2018-09-24

## 2018-09-24 RX ORDER — ACETAMINOPHEN 500 MG
650 TABLET ORAL ONCE
Qty: 0 | Refills: 0 | Status: COMPLETED | OUTPATIENT
Start: 2018-09-24 | End: 2018-09-24

## 2018-09-24 RX ORDER — CEFTRIAXONE 500 MG/1
1 INJECTION, POWDER, FOR SOLUTION INTRAMUSCULAR; INTRAVENOUS ONCE
Qty: 0 | Refills: 0 | Status: COMPLETED | OUTPATIENT
Start: 2018-09-24 | End: 2018-09-24

## 2018-09-24 RX ORDER — ENOXAPARIN SODIUM 100 MG/ML
40 INJECTION SUBCUTANEOUS EVERY 24 HOURS
Qty: 0 | Refills: 0 | Status: DISCONTINUED | OUTPATIENT
Start: 2018-09-24 | End: 2018-09-28

## 2018-09-24 RX ORDER — SODIUM CHLORIDE 9 MG/ML
1000 INJECTION INTRAMUSCULAR; INTRAVENOUS; SUBCUTANEOUS ONCE
Qty: 0 | Refills: 0 | Status: COMPLETED | OUTPATIENT
Start: 2018-09-24 | End: 2018-09-24

## 2018-09-24 RX ORDER — POLYETHYLENE GLYCOL 3350 17 G/17G
17 POWDER, FOR SOLUTION ORAL ONCE
Qty: 0 | Refills: 0 | Status: COMPLETED | OUTPATIENT
Start: 2018-09-24 | End: 2018-09-24

## 2018-09-24 RX ORDER — ASPIRIN/CALCIUM CARB/MAGNESIUM 324 MG
325 TABLET ORAL DAILY
Qty: 0 | Refills: 0 | Status: DISCONTINUED | OUTPATIENT
Start: 2018-09-24 | End: 2018-09-28

## 2018-09-24 RX ORDER — MEMANTINE HYDROCHLORIDE 10 MG/1
10 TABLET ORAL
Qty: 0 | Refills: 0 | Status: DISCONTINUED | OUTPATIENT
Start: 2018-09-24 | End: 2018-09-28

## 2018-09-24 RX ORDER — CARVEDILOL PHOSPHATE 80 MG/1
12.5 CAPSULE, EXTENDED RELEASE ORAL EVERY 12 HOURS
Qty: 0 | Refills: 0 | Status: DISCONTINUED | OUTPATIENT
Start: 2018-09-24 | End: 2018-09-24

## 2018-09-24 RX ORDER — DONEPEZIL HYDROCHLORIDE 10 MG/1
10 TABLET, FILM COATED ORAL AT BEDTIME
Qty: 0 | Refills: 0 | Status: DISCONTINUED | OUTPATIENT
Start: 2018-09-24 | End: 2018-09-28

## 2018-09-24 RX ORDER — DOCUSATE SODIUM 100 MG
100 CAPSULE ORAL THREE TIMES A DAY
Qty: 0 | Refills: 0 | Status: DISCONTINUED | OUTPATIENT
Start: 2018-09-24 | End: 2018-09-28

## 2018-09-24 RX ORDER — SODIUM CHLORIDE 9 MG/ML
1000 INJECTION INTRAMUSCULAR; INTRAVENOUS; SUBCUTANEOUS
Qty: 0 | Refills: 0 | Status: DISCONTINUED | OUTPATIENT
Start: 2018-09-24 | End: 2018-09-26

## 2018-09-24 RX ORDER — POLYETHYLENE GLYCOL 3350 17 G/17G
17 POWDER, FOR SOLUTION ORAL DAILY
Qty: 0 | Refills: 0 | Status: DISCONTINUED | OUTPATIENT
Start: 2018-09-24 | End: 2018-09-28

## 2018-09-24 RX ORDER — PIOGLITAZONE HYDROCHLORIDE 15 MG/1
45 TABLET ORAL DAILY
Qty: 0 | Refills: 0 | Status: DISCONTINUED | OUTPATIENT
Start: 2018-09-24 | End: 2018-09-28

## 2018-09-24 RX ORDER — ACETAMINOPHEN 500 MG
650 TABLET ORAL EVERY 6 HOURS
Qty: 0 | Refills: 0 | Status: DISCONTINUED | OUTPATIENT
Start: 2018-09-24 | End: 2018-09-28

## 2018-09-24 RX ORDER — OXYBUTYNIN CHLORIDE 5 MG
10 TABLET ORAL DAILY
Qty: 0 | Refills: 0 | Status: DISCONTINUED | OUTPATIENT
Start: 2018-09-24 | End: 2018-09-28

## 2018-09-24 RX ORDER — SODIUM CHLORIDE 9 MG/ML
1000 INJECTION, SOLUTION INTRAVENOUS
Qty: 0 | Refills: 0 | Status: DISCONTINUED | OUTPATIENT
Start: 2018-09-24 | End: 2018-09-24

## 2018-09-24 RX ORDER — INSULIN LISPRO 100/ML
VIAL (ML) SUBCUTANEOUS
Qty: 0 | Refills: 0 | Status: DISCONTINUED | OUTPATIENT
Start: 2018-09-24 | End: 2018-09-28

## 2018-09-24 RX ORDER — CEFTRIAXONE 500 MG/1
1 INJECTION, POWDER, FOR SOLUTION INTRAMUSCULAR; INTRAVENOUS EVERY 24 HOURS
Qty: 0 | Refills: 0 | Status: DISCONTINUED | OUTPATIENT
Start: 2018-09-24 | End: 2018-09-26

## 2018-09-24 RX ADMIN — Medication 10 MILLIGRAM(S): at 15:30

## 2018-09-24 RX ADMIN — ENOXAPARIN SODIUM 40 MILLIGRAM(S): 100 INJECTION SUBCUTANEOUS at 15:29

## 2018-09-24 RX ADMIN — SODIUM CHLORIDE 125 MILLILITER(S): 9 INJECTION INTRAMUSCULAR; INTRAVENOUS; SUBCUTANEOUS at 23:53

## 2018-09-24 RX ADMIN — MEMANTINE HYDROCHLORIDE 10 MILLIGRAM(S): 10 TABLET ORAL at 17:28

## 2018-09-24 RX ADMIN — SODIUM CHLORIDE 2000 MILLILITER(S): 9 INJECTION INTRAMUSCULAR; INTRAVENOUS; SUBCUTANEOUS at 09:27

## 2018-09-24 RX ADMIN — PIOGLITAZONE HYDROCHLORIDE 45 MILLIGRAM(S): 15 TABLET ORAL at 21:43

## 2018-09-24 RX ADMIN — DONEPEZIL HYDROCHLORIDE 10 MILLIGRAM(S): 10 TABLET, FILM COATED ORAL at 21:41

## 2018-09-24 RX ADMIN — Medication 1: at 17:32

## 2018-09-24 RX ADMIN — CEFTRIAXONE 100 GRAM(S): 500 INJECTION, POWDER, FOR SOLUTION INTRAMUSCULAR; INTRAVENOUS at 15:32

## 2018-09-24 RX ADMIN — Medication 325 MILLIGRAM(S): at 15:28

## 2018-09-24 RX ADMIN — SODIUM CHLORIDE 125 MILLILITER(S): 9 INJECTION INTRAMUSCULAR; INTRAVENOUS; SUBCUTANEOUS at 15:10

## 2018-09-24 RX ADMIN — PANTOPRAZOLE SODIUM 40 MILLIGRAM(S): 20 TABLET, DELAYED RELEASE ORAL at 17:29

## 2018-09-24 RX ADMIN — Medication 650 MILLIGRAM(S): at 09:58

## 2018-09-24 RX ADMIN — Medication 100 MILLIGRAM(S): at 15:28

## 2018-09-24 RX ADMIN — CEFTRIAXONE 100 GRAM(S): 500 INJECTION, POWDER, FOR SOLUTION INTRAMUSCULAR; INTRAVENOUS at 11:17

## 2018-09-24 RX ADMIN — Medication 100 MILLIGRAM(S): at 21:42

## 2018-09-24 RX ADMIN — POLYETHYLENE GLYCOL 3350 17 GRAM(S): 17 POWDER, FOR SOLUTION ORAL at 15:28

## 2018-09-24 RX ADMIN — Medication 650 MILLIGRAM(S): at 19:47

## 2018-09-24 RX ADMIN — SODIUM CHLORIDE 1000 MILLILITER(S): 9 INJECTION INTRAMUSCULAR; INTRAVENOUS; SUBCUTANEOUS at 11:17

## 2018-09-24 NOTE — H&P ADULT - HISTORY OF PRESENT ILLNESS
85y old male pmhx below presents to the ED as per daughter for generalised weakness and confusion since yesterday now progressively worse today. hx of UTI and constipation, daughter states decrease po intake and unable to get off toilet seat. as per EMS cloudy urine with fever of 101.2 in ED. Pt is hard of hearing unable to get further hx

## 2018-09-24 NOTE — ED PROVIDER NOTE - PHYSICAL EXAMINATION
VITAL SIGNS: I have reviewed nursing notes and confirm.  CONSTITUTIONAL: Well-developed; dry mucus membranes. No NAD  SKIN: Skin exam is warm and dry, no acute rash.  HEAD: Normocephalic; atraumatic..  NECK: Supple; non tender.  No lymphadenopathy.  CARD: S1, S2 normal; no murmurs, gallops, or rubs. Regular rate and rhythm.  RESP: No wheezes, rales or rhonchi.  ABD: Decreased  bowel sounds; soft; non-distended; +  suprapubic tenderness  EXT: Normal ROM. No clubbing, cyanosis or edema.  NEURO: Alert, oriented x 2  PSYCH: Cooperative, appropriate.

## 2018-09-24 NOTE — H&P ADULT - NSHPLABSRESULTS_GEN_ALL_CORE
11.1   12.23 )-----------( 149      ( 24 Sep 2018 09:13 )             33.7           140  |  102  |  25<H>  ----------------------------<  160<H>  4.9   |  25  |  1.4    Ca    9.0      24 Sep 2018 09:13  Mg     2.0         TPro  6.3  /  Alb  3.8  /  TBili  0.9  /  DBili  x   /  AST  34  /  ALT  10  /  AlkPhos  58            Magnesium, Serum: 2.0 mg/dL (18 @ 09:13)          Urinalysis Basic - ( 24 Sep 2018 10:34 )    Color: Yellow / Appearance: Clear / S.025 / pH: x  Gluc: x / Ketone: Negative  / Bili: Negative / Urobili: 0.2 mg/dL   Blood: x / Protein: 30 mg/dL / Nitrite: Positive   Leuk Esterase: Large / RBC: 25-50 /HPF / WBC >50 /HPF   Sq Epi: x / Non Sq Epi: x / Bacteria: x            Lactate Trend      CARDIAC MARKERS ( 24 Sep 2018 12:25 )  x     / 0.03 ng/mL / x     / x     / x      CARDIAC MARKERS ( 24 Sep 2018 09:13 )  x     / 0.04 ng/mL / x     / x     / x

## 2018-09-24 NOTE — PATIENT PROFILE ADULT. - ABILITY TO HEAR (WITH HEARING AID OR HEARING APPLIANCE IF NORMALLY USED):
uses hearing aide in R ear/Mildly to Moderately Impaired: difficulty hearing in some environments or speaker may need to increase volume or speak distinctly

## 2018-09-24 NOTE — ED PROVIDER NOTE - NS ED ROS FT
Constitutional: See HPI.  Eyes: No visual changes, eye pain or discharge. No Photophobia  ENMT: No hearing changes, pain, discharge or infections. No neck pain or stiffness. No limited ROM  Cardiac: No SOB or edema. No chest pain with exertion.  Respiratory: No cough or respiratory distress. No hemoptysis. No history of asthma or RAD.  GI: + suprapubic pain  : No dysuria, frequency or burning. No Discharge  MS:  + weakness   Neuro: No headache, falls. No LOC.  Skin: No skin rash.  Except as documented in the HPI, all other systems are negative.

## 2018-09-24 NOTE — H&P ADULT - ATTENDING COMMENTS
Patient was evaluated and examined by bedside, independently of PA, no c/o abdominal pain, no confusion, feels weak,     All labs, radiology studies, VS was reviewed  I agree with medical plan outlined by Medical resident as stated above.  -Sepsis due to UTI- IVF, Urine and blood cxs  -IV rocephin tx.    -Dehydration with mild MARVIN- IVF,   -I and O monitoring  -f/up BMP in am    DM- bsfs every 6 hours    - Hypotension- with dehydration  -Hold Coreg and Losartan  -IVF    -trop above reference range  -cardiac monitoring for next 24 hours  -trend trops

## 2018-09-24 NOTE — H&P ADULT - NSHPPHYSICALEXAM_GEN_ALL_CORE
PHYSICAL EXAM:  GENERAL: alert, NAD, well-developed, well nourished, looks stated age  HEAD:  Atraumatic, Normocephalic  EYES: EOMI, PERRLA, conjunctiva and sclera clear  NECK: Supple, No JVD  CHEST/LUNG: Clear to auscultation bilaterally  HEART: S1,S2 Regular rate and rhythm  ABDOMEN: Soft, nontender, nondistended, obese, Bowel sounds present and normoactive  EXTREMITIES:  2+ peripheral pulses bilaterally and symmetrically  NEUROLOGY: non-focal, muscle strength 5/5 all extremities  SKIN: No rashes or lesions

## 2018-09-24 NOTE — H&P ADULT - ASSESSMENT
DX; UTI  A/P; admit to med-surg  tele monitor  labs/ecg/c-xray  cex3  IV hydration  IV ABX  continue previous meds  f.s with insulin coverage  valdivia care  cardiology consult  PT/REHAB EVAL  monitor vss  monitor pt

## 2018-09-24 NOTE — ED ADULT NURSE NOTE - NSIMPLEMENTINTERV_GEN_ALL_ED
Implemented All Fall Risk Interventions:  Bland to call system. Call bell, personal items and telephone within reach. Instruct patient to call for assistance. Room bathroom lighting operational. Non-slip footwear when patient is off stretcher. Physically safe environment: no spills, clutter or unnecessary equipment. Stretcher in lowest position, wheels locked, appropriate side rails in place. Provide visual cue, wrist band, yellow gown, etc. Monitor gait and stability. Monitor for mental status changes and reorient to person, place, and time. Review medications for side effects contributing to fall risk. Reinforce activity limits and safety measures with patient and family.

## 2018-09-24 NOTE — CONSULT NOTE ADULT - SUBJECTIVE AND OBJECTIVE BOX
HPI:  85y old male pmhx below presents to the ED as per daughter for generalised weakness and confusion since yesterday now progressively worse today. hx of UTI and constipation, daughter states decrease po intake and unable to get off toilet seat. as per EMS cloudy urine with fever of 101.2 in ED. Pt is hard of hearing unable to get further hx .    PTN  REFERRED TO ACUTE  REHAB  FOR  EVAL AND  TX   PAST MEDICAL & SURGICAL HISTORY:  Dementia  Afib  High cholesterol  Hypertension  Diabetes  No significant past surgical history      Hospital Course:    TODAY'S SUBJECTIVE & REVIEW OF SYMPTOMS:     Constitutional WNL   Cardio WNL   Resp WNL   GI WNL  Heme WNL  Endo WNL  Skin WNL  MSK WNL  Neuro WNL  Cognitive WNL  Psych WNL      MEDICATIONS  (STANDING):  aspirin 325 milliGRAM(s) Oral daily  cefTRIAXone   IVPB 1 Gram(s) IV Intermittent every 24 hours  dextrose 5%. 1000 milliLiter(s) (50 mL/Hr) IV Continuous <Continuous>  docusate sodium 100 milliGRAM(s) Oral three times a day  donepezil 10 milliGRAM(s) Oral at bedtime  enoxaparin Injectable 40 milliGRAM(s) SubCutaneous every 24 hours  insulin lispro (HumaLOG) corrective regimen sliding scale   SubCutaneous three times a day before meals  memantine 10 milliGRAM(s) Oral two times a day  oxybutynin 10 milliGRAM(s) Oral daily  pantoprazole    Tablet 40 milliGRAM(s) Oral before breakfast  pioglitazone 45 milliGRAM(s) Oral daily  sodium biphosphate Rectal Enema 1 Enema Rectal once  sodium chloride 0.9%. 1000 milliLiter(s) (125 mL/Hr) IV Continuous <Continuous>    MEDICATIONS  (PRN):  acetaminophen   Tablet .. 650 milliGRAM(s) Oral every 6 hours PRN Temp greater or equal to 38C (100.4F), Mild Pain (1 - 3)  polyethylene glycol 3350 17 Gram(s) Oral daily PRN Constipation      FAMILY HISTORY:  No pertinent family history in first degree relatives      Allergies    Avandia (Unknown)  Lipitor (Unknown)  metformin (Unknown)    Intolerances        SOCIAL HISTORY:    [  ] Etoh  [  ] Smoking  [  ] Substance abuse     Home Environment:  [  ] Home Alone  [ x ] Lives with Family  [  ] Home Health Aid    Dwelling:  [  ] Apartment  [ x ] Private House  [  ] Adult Home  [  ] Skilled Nursing Facility      [  ] Short Term  [  ] Long Term  [  x] Stairs       Elevator [  ]    FUNCTIONAL STATUS PTA: (Check all that apply)  Ambulation: [  x ]Independent    [  ] Dependent     [  ] Non-Ambulatory  Assistive Device: [ x] SA Cane  [  ]  Q Cane  [x  ] Walker  [  ]  Wheelchair  ADL : [ x ] Independent  [  ]  Dependent       Vital Signs Last 24 Hrs  T(C): 35.3 (24 Sep 2018 12:41), Max: 38.4 (24 Sep 2018 08:27)  T(F): 95.6 (24 Sep 2018 12:41), Max: 101.2 (24 Sep 2018 08:27)  HR: 56 (24 Sep 2018 12:41) (55 - 65)  BP: 91/49 (24 Sep 2018 12:41) (91/49 - 139/60)  BP(mean): --  RR: 16 (24 Sep 2018 12:41) (16 - 18)  SpO2: 96% (24 Sep 2018 11:10) (96% - 98%)      PHYSICAL EXAM: Alert & Oriented X 2  GENERAL: NAD, well-groomed, well-developed  HEAD:  Atraumatic, Normocephalic  EYES: EOMI, PERRLA, conjunctiva and sclera clear  NECK: Supple, No JVD, Normal thyroid  CHEST/LUNG: Clear to percussion bilaterally; No rales, rhonchi, wheezing, or rubs  HEART: Regular rate and rhythm; No murmurs, rubs, or gallops  ABDOMEN: Soft, Nontender, Nondistended; Bowel sounds present  EXTREMITIES:  2+ Peripheral Pulses, No clubbing, cyanosis, or edema    NERVOUS SYSTEM:  Cranial Nerves 2-12 intact [x  ] Abnormal  [  ]  ROM: WFL all extremities [ passive  ]  Abnormal [  ]  Motor Strength: WFL all extremities  [  ]  Abnormal [ 4/5  all  ext ]  Sensation: intact to light touch [x  ] Abnormal [  ]  Reflexes: Symmetric [x  ]  Abnormal [  ]    FUNCTIONAL STATUS:  Bed Mobility: Independent [  ]  Supervision [  ]  Needs Assistance [x  ]  N/A [  ]  Transfers: Independent [  ]  Supervision [  ]  Needs Assistance [ x ]  N/A [  ]   Ambulation: Independent [  ]  Supervision [  ]  Needs Assistance [ x ]  N/A [  ]  ADL: Independent [ x] Requires Assistance [  ] N/A [  ]      LABS:                        11.1   12.23 )-----------( 149      ( 24 Sep 2018 09:13 )             33.7         140  |  102  |  25<H>  ----------------------------<  160<H>  4.9   |  25  |  1.4    Ca    9.0      24 Sep 2018 09:13  Mg     2.0         TPro  6.3  /  Alb  3.8  /  TBili  0.9  /  DBili  x   /  AST  34  /  ALT  10  /  AlkPhos  58        Urinalysis Basic - ( 24 Sep 2018 10:34 )    Color: Yellow / Appearance: Clear / S.025 / pH: x  Gluc: x / Ketone: Negative  / Bili: Negative / Urobili: 0.2 mg/dL   Blood: x / Protein: 30 mg/dL / Nitrite: Positive   Leuk Esterase: Large / RBC: 25-50 /HPF / WBC >50 /HPF   Sq Epi: x / Non Sq Epi: x / Bacteria: x        RADIOLOGY & ADDITIONAL STUDIES:    Assesment:

## 2018-09-24 NOTE — ED PROVIDER NOTE - PROGRESS NOTE DETAILS
86 y/o male with cc- weakness and dehydration. WBC- 12, T max- 101. 2 and UA positive for UTI. s/p 2L NS, tylenol and Rocephin. Given lethargy, dehydration and KUB findings of fecal impaction, pt warrants admission. Ordered tap water enema for constipation. I personally evaluated the patient. I reviewed the Resident’s or Physician Assistant’s note (as assigned above), and agree with the findings and plan except as documented in my note. Pt brought by family for progressive weakness and inability to ambulate. Pt has had similar presentations in the past when he had a UTI. Normally he is ambulatory with assistance, he has not been able to ambulate for the last 2 days. VS noted. Pt is NAD, somewhat lethargic. Chest clear, abdomen  NT, no CVA tenderness. Diagnostic testing reviewed. Consistent with UTI.  ABX  initiated in the ED. After interviewing family, they are unable to perform ADLs , will admit. Admission will be to a monitored setting in view of the abnormal troponin level.

## 2018-09-24 NOTE — CONSULT NOTE ADULT - ASSESSMENT
IMPRESSION: Rehab of 84 y/o  m  rehab   for  debility gd      PRECAUTIONS: [  ] Cardiac  [  ] Respiratory  [  ] Seizures [  ] Contact Isolation  [  ] Droplet Isolation  [ fall  ] Other    Weight Bearing Status:     RECOMMENDATION:    Out of Bed to Chair     DVT/Decubiti Prophylaxis    REHAB PLAN:     [ xx  ] Bedside P/T 3-5 times a week   [   ]   Bedside O/T  2-3 times a week             [   ] No Rehab Therapy Indicated                   [   ]  Speech Therapy   Conditioning/ROM                                    ADL  Bed Mobility                                               Conditioning/ROM  Transfers                                                     Bed Mobility  Sitting /Standing Balance                         Transfers                                        Gait Training                                               Sitting/Standing Balance  Stair Training [   ]Applicable                    Home equipment Eval                                                                        Splinting  [   ] Only      GOALS:   ADL   [  x ]   Independent                    Transfers  [ x  ] Independent                          Ambulation  [ x  ] Independent     [  x  ] With device                            [   ]  CG                                                         [   ]  CG                                                                  [   ] CG                            [    ] Min A                                                   [   ] Min A                                                              [   ] Min  A          DISCHARGE PLAN:   [   ]  Good candidate for Intensive Rehabilitation/Hospital based-[facility]A [facility]                                             Will tolerate 3hrs Intensive Rehab Daily                                       [  xx  ]  Short Term Rehab in Skilled Nursing Facility                                       [    ]  Home with Outpatient or VN services                                         [    ]  Possible Candidate for Intensive Hospital based Rehab

## 2018-09-24 NOTE — ED PROVIDER NOTE - OBJECTIVE STATEMENT
Patient is a 84 y/o Male with PMHx of Dementia A& O x 2, Chronic Constipation, Afib, High cholesterol, Hypertension, NIDDM, CAD s/p CABG brought in from home by Daughter with complaints of dehydration, weakness and possible UTI.    As per EMs and daughter, pt has cloudy urine. Per daughter, he is weak with decreased PO intake and was unable to get himself off the toilet. Last BM unknown.

## 2018-09-25 DIAGNOSIS — A41.9 SEPSIS, UNSPECIFIED ORGANISM: ICD-10-CM

## 2018-09-25 DIAGNOSIS — K59.00 CONSTIPATION, UNSPECIFIED: ICD-10-CM

## 2018-09-25 DIAGNOSIS — I25.10 ATHEROSCLEROTIC HEART DISEASE OF NATIVE CORONARY ARTERY WITHOUT ANGINA PECTORIS: ICD-10-CM

## 2018-09-25 DIAGNOSIS — R74.8 ABNORMAL LEVELS OF OTHER SERUM ENZYMES: ICD-10-CM

## 2018-09-25 DIAGNOSIS — G30.9 ALZHEIMER'S DISEASE, UNSPECIFIED: ICD-10-CM

## 2018-09-25 DIAGNOSIS — E11.9 TYPE 2 DIABETES MELLITUS WITHOUT COMPLICATIONS: ICD-10-CM

## 2018-09-25 DIAGNOSIS — I10 ESSENTIAL (PRIMARY) HYPERTENSION: ICD-10-CM

## 2018-09-25 LAB
ANION GAP SERPL CALC-SCNC: 14 MMOL/L — SIGNIFICANT CHANGE UP (ref 7–14)
BUN SERPL-MCNC: 24 MG/DL — HIGH (ref 10–20)
CALCIUM SERPL-MCNC: 8.2 MG/DL — LOW (ref 8.5–10.1)
CHLORIDE SERPL-SCNC: 106 MMOL/L — SIGNIFICANT CHANGE UP (ref 98–110)
CO2 SERPL-SCNC: 23 MMOL/L — SIGNIFICANT CHANGE UP (ref 17–32)
CREAT SERPL-MCNC: 1.2 MG/DL — SIGNIFICANT CHANGE UP (ref 0.7–1.5)
ESTIMATED AVERAGE GLUCOSE: 146 MG/DL — HIGH (ref 68–114)
GLUCOSE BLDC GLUCOMTR-MCNC: 117 MG/DL — HIGH (ref 70–99)
GLUCOSE BLDC GLUCOMTR-MCNC: 164 MG/DL — HIGH (ref 70–99)
GLUCOSE BLDC GLUCOMTR-MCNC: 193 MG/DL — HIGH (ref 70–99)
GLUCOSE BLDC GLUCOMTR-MCNC: 269 MG/DL — HIGH (ref 70–99)
GLUCOSE SERPL-MCNC: 111 MG/DL — HIGH (ref 70–99)
HBA1C BLD-MCNC: 6.7 % — HIGH (ref 4–5.6)
HCT VFR BLD CALC: 30.2 % — LOW (ref 42–52)
HGB BLD-MCNC: 9.7 G/DL — LOW (ref 14–18)
MCHC RBC-ENTMCNC: 28.5 PG — SIGNIFICANT CHANGE UP (ref 27–31)
MCHC RBC-ENTMCNC: 32.1 G/DL — SIGNIFICANT CHANGE UP (ref 32–37)
MCV RBC AUTO: 88.8 FL — SIGNIFICANT CHANGE UP (ref 80–94)
NRBC # BLD: 0 /100 WBCS — SIGNIFICANT CHANGE UP (ref 0–0)
PLATELET # BLD AUTO: 120 K/UL — LOW (ref 130–400)
POTASSIUM SERPL-MCNC: 3.9 MMOL/L — SIGNIFICANT CHANGE UP (ref 3.5–5)
POTASSIUM SERPL-SCNC: 3.9 MMOL/L — SIGNIFICANT CHANGE UP (ref 3.5–5)
RBC # BLD: 3.4 M/UL — LOW (ref 4.7–6.1)
RBC # FLD: 15 % — HIGH (ref 11.5–14.5)
SODIUM SERPL-SCNC: 143 MMOL/L — SIGNIFICANT CHANGE UP (ref 135–146)
TROPONIN T SERPL-MCNC: 0.04 NG/ML — CRITICAL HIGH
WBC # BLD: 10.21 K/UL — SIGNIFICANT CHANGE UP (ref 4.8–10.8)
WBC # FLD AUTO: 10.21 K/UL — SIGNIFICANT CHANGE UP (ref 4.8–10.8)

## 2018-09-25 RX ADMIN — Medication 325 MILLIGRAM(S): at 11:53

## 2018-09-25 RX ADMIN — Medication 1 ENEMA: at 11:53

## 2018-09-25 RX ADMIN — SODIUM CHLORIDE 125 MILLILITER(S): 9 INJECTION INTRAMUSCULAR; INTRAVENOUS; SUBCUTANEOUS at 17:03

## 2018-09-25 RX ADMIN — Medication 10 MILLIGRAM(S): at 11:53

## 2018-09-25 RX ADMIN — PIOGLITAZONE HYDROCHLORIDE 45 MILLIGRAM(S): 15 TABLET ORAL at 12:37

## 2018-09-25 RX ADMIN — Medication 100 MILLIGRAM(S): at 05:23

## 2018-09-25 RX ADMIN — MEMANTINE HYDROCHLORIDE 10 MILLIGRAM(S): 10 TABLET ORAL at 05:23

## 2018-09-25 RX ADMIN — DONEPEZIL HYDROCHLORIDE 10 MILLIGRAM(S): 10 TABLET, FILM COATED ORAL at 21:20

## 2018-09-25 RX ADMIN — Medication 650 MILLIGRAM(S): at 22:41

## 2018-09-25 RX ADMIN — ENOXAPARIN SODIUM 40 MILLIGRAM(S): 100 INJECTION SUBCUTANEOUS at 17:03

## 2018-09-25 RX ADMIN — CEFTRIAXONE 100 GRAM(S): 500 INJECTION, POWDER, FOR SOLUTION INTRAMUSCULAR; INTRAVENOUS at 09:53

## 2018-09-25 RX ADMIN — SODIUM CHLORIDE 125 MILLILITER(S): 9 INJECTION INTRAMUSCULAR; INTRAVENOUS; SUBCUTANEOUS at 14:29

## 2018-09-25 RX ADMIN — Medication 100 MILLIGRAM(S): at 14:28

## 2018-09-25 RX ADMIN — MEMANTINE HYDROCHLORIDE 10 MILLIGRAM(S): 10 TABLET ORAL at 17:03

## 2018-09-25 RX ADMIN — PANTOPRAZOLE SODIUM 40 MILLIGRAM(S): 20 TABLET, DELAYED RELEASE ORAL at 06:08

## 2018-09-25 RX ADMIN — Medication 3: at 17:04

## 2018-09-25 RX ADMIN — Medication 1: at 11:55

## 2018-09-25 RX ADMIN — Medication 100 MILLIGRAM(S): at 21:20

## 2018-09-25 NOTE — PROGRESS NOTE ADULT - SUBJECTIVE AND OBJECTIVE BOX
DIONNE CURIEL  85y  Male      Patient is a 85y old Male who presents with a chief complaint of weaknes (24 Sep 2018 16:19)      INTERVAL HPI/OVERNIGHT EVENTS:  Patient was febrile in the evening    REVIEW OF SYSTEMS:  CONSTITUTIONAL: fever and fatigue  EYES: No eye pain, visual disturbances, or discharge  ENMT:  No difficulty hearing, tinnitus, vertigo; No sinus or throat pain  NECK: No pain or stiffness  RESPIRATORY: No cough, wheezing, chills or hemoptysis; No shortness of breath  CARDIOVASCULAR: No chest pain, palpitations, dizziness, or leg swelling  GASTROINTESTINAL: constipation.  GENITOURINARY: No dysuria, frequency, hematuria, or incontinence  NEUROLOGICAL: No headaches, memory loss, loss of strength, numbness, or tremors  SKIN: No itching, burning, rashes, or lesions   LYMPH NODES: No enlarged glands  ENDOCRINE: No heat or cold intolerance; No hair loss  MUSCULOSKELETAL: No joint pain or swelling; No muscle, back, or extremity pain  PSYCHIATRIC: No depression, anxiety, mood swings, or difficulty sleeping  HEME/LYMPH: No easy bruising, or bleeding gums  ALLERY AND IMMUNOLOGIC: No hives or eczema    T(C): 37.7 (18 @ 06:05), Max: 38.9 (18 @ 19:45)  HR: 73 (18 @ 06:05) (56 - 73)  BP: 90/54 (18 @ 06:05) (90/54 - 107/51)  RR: 16 (18 @ 06:05) (16 - 16)  SpO2: --      PHYSICAL EXAM:  GENERAL: NAD, well-groomed, well-developed  HEAD:  Atraumatic, Normocephalic  EYES: EOMI, PERRLA, conjunctiva and sclera clear  ENMT: No tonsillar erythema, exudates, or enlargement; Moist mucous membranes, Good dentition, No lesions  NECK: Supple, No JVD, Normal thyroid  NERVOUS SYSTEM:  Alert & Oriented X3, Good concentration; Motor Strength 5/5 B/L upper and lower extremities; DTRs 2+ intact and symmetric  CHEST/LUNG: Clear to percussion bilaterally; No rales, rhonchi, wheezing, or rubs  HEART: Regular rate and rhythm; No murmurs, rubs, or gallops  ABDOMEN: Soft, Nontender, Nondistended; Bowel sounds present  EXTREMITIES:  2+ Peripheral Pulses, No clubbing, cyanosis, +1 edema  LYMPH: No lymphadenopathy noted  SKIN: No rashes or lesions    Consultant(s) Notes Reviewed:  [x ] YES  [ ] NO  Care Discussed with Consultants/Other Providers [ x] YES  [ ] NO    LAB:        143  |  106  |  24<H>  ----------------------------<  111<H>  3.9   |  23  |  1.2    Ca    8.2<L>      25 Sep 2018 06:47  Mg     2.0         TPro  6.3  /  Alb  3.8  /  TBili  0.9  /  DBili  x   /  AST  34  /  ALT  10  /  AlkPhos  58  24    CARDIAC MARKERS ( 25 Sep 2018 06:47 )  x     / 0.04 ng/mL / x     / x     / x      CARDIAC MARKERS ( 24 Sep 2018 21:53 )  x     / 0.04 ng/mL / x     / x     / x      CARDIAC MARKERS ( 24 Sep 2018 19:03 )  x     / 0.04 ng/mL / x     / x     / x      CARDIAC MARKERS ( 24 Sep 2018 12:25 )  x     / 0.03 ng/mL / x     / x     / x      CARDIAC MARKERS ( 24 Sep 2018 09:13 )  x     / 0.04 ng/mL / x     / x     / x                                9.7    10.21 )-----------( 120      ( 25 Sep 2018 06:47 )             30.2     Daily Height in cm: 177.8 (24 Sep 2018 12:41)    Daily   Drug Dosing Weight  Height (cm): 177.8 (24 Sep 2018 12:41)  Weight (kg): 99.9 (24 Sep 2018 12:41)  BMI (kg/m2): 31.6 (24 Sep 2018 12:41)  BSA (m2): 2.17 (24 Sep 2018 12:41)  CAPILLARY BLOOD GLUCOSE      POCT Blood Glucose.: 193 mg/dL (25 Sep 2018 11:26)  POCT Blood Glucose.: 117 mg/dL (25 Sep 2018 07:45)  POCT Blood Glucose.: 163 mg/dL (24 Sep 2018 20:58)  POCT Blood Glucose.: 181 mg/dL (24 Sep 2018 18:33)  POCT Blood Glucose.: 151 mg/dL (24 Sep 2018 17:29)  POCT Blood Glucose.: 130 mg/dL (24 Sep 2018 16:06)    I&O's Summary    24 Sep 2018 07:01  -  25 Sep 2018 07:00  --------------------------------------------------------  IN: 0 mL / OUT: 680 mL / NET: -680 mL      Urinalysis Basic - ( 24 Sep 2018 10:34 )    Color: Yellow / Appearance: Clear / S.025 / pH: x  Gluc: x / Ketone: Negative  / Bili: Negative / Urobili: 0.2 mg/dL   Blood: x / Protein: 30 mg/dL / Nitrite: Positive   Leuk Esterase: Large / RBC: 25-50 /HPF / WBC >50 /HPF   Sq Epi: x / Non Sq Epi: x / Bacteria: x      LIVER FUNCTIONS - ( 24 Sep 2018 09:13 )  Alb: 3.8 g/dL / Pro: 6.3 g/dL / ALK PHOS: 58 U/L / ALT: 10 U/L / AST: 34 U/L / GGT: x               RADIOLOGY & ADDITIONAL TESTS:    Imaging Personally Reviewed:  [x ] YES  [ ] NO    MEDICATIONS  (STANDING):  aspirin 325 milliGRAM(s) Oral daily  cefTRIAXone   IVPB 1 Gram(s) IV Intermittent every 24 hours  dextrose 5%. 1000 milliLiter(s) (50 mL/Hr) IV Continuous <Continuous>  docusate sodium 100 milliGRAM(s) Oral three times a day  donepezil 10 milliGRAM(s) Oral at bedtime  enoxaparin Injectable 40 milliGRAM(s) SubCutaneous every 24 hours  insulin lispro (HumaLOG) corrective regimen sliding scale   SubCutaneous three times a day before meals  memantine 10 milliGRAM(s) Oral two times a day  oxybutynin 10 milliGRAM(s) Oral daily  pantoprazole    Tablet 40 milliGRAM(s) Oral before breakfast  pioglitazone 45 milliGRAM(s) Oral daily  sodium chloride 0.9%. 1000 milliLiter(s) (125 mL/Hr) IV Continuous <Continuous>    MEDICATIONS  (PRN):  acetaminophen   Tablet .. 650 milliGRAM(s) Oral every 6 hours PRN Temp greater or equal to 38C (100.4F), Mild Pain (1 - 3)  polyethylene glycol 3350 17 Gram(s) Oral daily PRN Constipation

## 2018-09-25 NOTE — PHYSICAL THERAPY INITIAL EVALUATION ADULT - GAIT DEVIATIONS NOTED, PT EVAL
decreased osorio/decreased step length/decreased weight-shifting ability/stooped posture, dec heel strike/pushoff

## 2018-09-25 NOTE — PROGRESS NOTE ADULT - ASSESSMENT
Patient admitted with recurrent UTI/ sepsis POA secondary to UTI  found to be impacted (chronic problem)

## 2018-09-25 NOTE — PHYSICAL THERAPY INITIAL EVALUATION ADULT - GENERAL OBSERVATIONS, REHAB EVAL
10:40-11:00 Chart reviewed. Pt encountered semireclined in bed, may be seen by Physical Therapist as confirmed with Nurse. Patient denied pain at rest and would like to walk now; +tele; +valdivia; +IV

## 2018-09-25 NOTE — PHYSICAL THERAPY INITIAL EVALUATION ADULT - IMPAIRED TRANSFERS: SIT/STAND, REHAB EVAL
impaired postural control/decreased endurance/narrow base of support/decreased strength/impaired balance

## 2018-09-26 LAB
-  AMPICILLIN/SULBACTAM: SIGNIFICANT CHANGE UP
-  CEFAZOLIN: SIGNIFICANT CHANGE UP
-  GENTAMICIN: SIGNIFICANT CHANGE UP
-  OXACILLIN: SIGNIFICANT CHANGE UP
-  RIFAMPIN: SIGNIFICANT CHANGE UP
-  TETRACYCLINE: SIGNIFICANT CHANGE UP
-  TRIMETHOPRIM/SULFAMETHOXAZOLE: SIGNIFICANT CHANGE UP
-  VANCOMYCIN: SIGNIFICANT CHANGE UP
CULTURE RESULTS: SIGNIFICANT CHANGE UP
GLUCOSE BLDC GLUCOMTR-MCNC: 133 MG/DL — HIGH (ref 70–99)
GLUCOSE BLDC GLUCOMTR-MCNC: 188 MG/DL — HIGH (ref 70–99)
GLUCOSE BLDC GLUCOMTR-MCNC: 266 MG/DL — HIGH (ref 70–99)
GLUCOSE BLDC GLUCOMTR-MCNC: 303 MG/DL — HIGH (ref 70–99)
GLUCOSE BLDC GLUCOMTR-MCNC: 355 MG/DL — HIGH (ref 70–99)
METHOD TYPE: SIGNIFICANT CHANGE UP
ORGANISM # SPEC MICROSCOPIC CNT: SIGNIFICANT CHANGE UP
ORGANISM # SPEC MICROSCOPIC CNT: SIGNIFICANT CHANGE UP
SPECIMEN SOURCE: SIGNIFICANT CHANGE UP

## 2018-09-26 RX ORDER — INSULIN LISPRO 100/ML
5 VIAL (ML) SUBCUTANEOUS ONCE
Qty: 0 | Refills: 0 | Status: COMPLETED | OUTPATIENT
Start: 2018-09-26 | End: 2018-09-26

## 2018-09-26 RX ORDER — VANCOMYCIN HCL 1 G
1000 VIAL (EA) INTRAVENOUS EVERY 12 HOURS
Qty: 0 | Refills: 0 | Status: DISCONTINUED | OUTPATIENT
Start: 2018-09-26 | End: 2018-09-27

## 2018-09-26 RX ADMIN — MEMANTINE HYDROCHLORIDE 10 MILLIGRAM(S): 10 TABLET ORAL at 17:40

## 2018-09-26 RX ADMIN — Medication 10 MILLIGRAM(S): at 12:06

## 2018-09-26 RX ADMIN — Medication 100 MILLIGRAM(S): at 21:30

## 2018-09-26 RX ADMIN — MEMANTINE HYDROCHLORIDE 10 MILLIGRAM(S): 10 TABLET ORAL at 05:34

## 2018-09-26 RX ADMIN — PIOGLITAZONE HYDROCHLORIDE 45 MILLIGRAM(S): 15 TABLET ORAL at 12:07

## 2018-09-26 RX ADMIN — Medication 325 MILLIGRAM(S): at 12:05

## 2018-09-26 RX ADMIN — CEFTRIAXONE 100 GRAM(S): 500 INJECTION, POWDER, FOR SOLUTION INTRAMUSCULAR; INTRAVENOUS at 09:08

## 2018-09-26 RX ADMIN — Medication 100 MILLIGRAM(S): at 05:34

## 2018-09-26 RX ADMIN — DONEPEZIL HYDROCHLORIDE 10 MILLIGRAM(S): 10 TABLET, FILM COATED ORAL at 21:30

## 2018-09-26 RX ADMIN — Medication 3: at 17:41

## 2018-09-26 RX ADMIN — Medication 5: at 14:00

## 2018-09-26 RX ADMIN — Medication 250 MILLIGRAM(S): at 17:47

## 2018-09-26 RX ADMIN — PANTOPRAZOLE SODIUM 40 MILLIGRAM(S): 20 TABLET, DELAYED RELEASE ORAL at 05:34

## 2018-09-26 RX ADMIN — ENOXAPARIN SODIUM 40 MILLIGRAM(S): 100 INJECTION SUBCUTANEOUS at 15:17

## 2018-09-26 RX ADMIN — SODIUM CHLORIDE 125 MILLILITER(S): 9 INJECTION INTRAMUSCULAR; INTRAVENOUS; SUBCUTANEOUS at 09:49

## 2018-09-26 RX ADMIN — Medication 650 MILLIGRAM(S): at 17:39

## 2018-09-26 NOTE — PROGRESS NOTE ADULT - ASSESSMENT
Patient admitted with recurrent UTI/ sepsis POA secondary to UTI  found to be impacted (chronic problem) - pt had several BM's s/p enemas

## 2018-09-26 NOTE — PROGRESS NOTE ADULT - SUBJECTIVE AND OBJECTIVE BOX
IDONNE CURIEL  85y  Male      Patient is a 85y old Male who presents with a chief complaint of weaknes (24 Sep 2018 16:19)      INTERVAL HPI/OVERNIGHT EVENTS:  Patient was febrile in the evening again today    REVIEW OF SYSTEMS:  CONSTITUTIONAL: fever and fatigue  EYES: No eye pain, visual disturbances, or discharge  ENMT:  No difficulty hearing, tinnitus, vertigo; No sinus or throat pain  NECK: No pain or stiffness  RESPIRATORY: No cough, wheezing, chills or hemoptysis; No shortness of breath  CARDIOVASCULAR: No chest pain, palpitations, dizziness, or leg swelling  GASTROINTESTINAL: constipation.  GENITOURINARY: No dysuria, frequency, hematuria, or incontinence  NEUROLOGICAL: No headaches, memory loss, loss of strength, numbness, or tremors  SKIN: No itching, burning, rashes, or lesions   LYMPH NODES: No enlarged glands  ENDOCRINE: No heat or cold intolerance; No hair loss  MUSCULOSKELETAL: No joint pain or swelling; No muscle, back, or extremity pain  PSYCHIATRIC: No depression, anxiety, mood swings, or difficulty sleeping  HEME/LYMPH: No easy bruising, or bleeding gums  ALLERY AND IMMUNOLOGIC: No hives or eczema    Vital Signs Last 24 Hrs  T(C): 37.1 (26 Sep 2018 06:01), Max: 38.6 (25 Sep 2018 22:47)  T(F): 98.7 (26 Sep 2018 06:01), Max: 101.4 (25 Sep 2018 22:47)  HR: 75 (26 Sep 2018 06:01) (70 - 83)  BP: 111/53 (26 Sep 2018 06:01) (105/56 - 111/53)  BP(mean): --  RR: 16 (26 Sep 2018 06:01) (16 - 16)  SpO2: --      PHYSICAL EXAM:  GENERAL: NAD, well-groomed, well-developed  HEAD:  Atraumatic, Normocephalic  EYES: EOMI, PERRLA, conjunctiva and sclera clear  ENMT: No tonsillar erythema, exudates, or enlargement; Moist mucous membranes, Good dentition, No lesions  NECK: Supple, No JVD, Normal thyroid  NERVOUS SYSTEM:  Alert & Oriented X3, Good concentration; Motor Strength 5/5 B/L upper and lower extremities; DTRs 2+ intact and symmetric  CHEST/LUNG: Clear to percussion bilaterally; No rales, rhonchi, wheezing, or rubs  HEART: Regular rate and rhythm; No murmurs, rubs, or gallops  ABDOMEN: Soft, Nontender, Nondistended; Bowel sounds present  EXTREMITIES:  2+ Peripheral Pulses, No clubbing, cyanosis, +1 edema  LYMPH: No lymphadenopathy noted  SKIN: No rashes or lesions    Consultant(s) Notes Reviewed:  [x ] YES  [ ] NO  Care Discussed with Consultants/Other Providers [ x] YES  [ ] NO    LAB:        143  |  106  |  24<H>  ----------------------------<  111<H>  3.9   |  23  |  1.2    Ca    8.2<L>      25 Sep 2018 06:47  Mg     2.0         TPro  6.3  /  Alb  3.8  /  TBili  0.9  /  DBili  x   /  AST  34  /  ALT  10  /  AlkPhos  58      CARDIAC MARKERS ( 25 Sep 2018 06:47 )  x     / 0.04 ng/mL / x     / x     / x      CARDIAC MARKERS ( 24 Sep 2018 21:53 )  x     / 0.04 ng/mL / x     / x     / x      CARDIAC MARKERS ( 24 Sep 2018 19:03 )  x     / 0.04 ng/mL / x     / x     / x      CARDIAC MARKERS ( 24 Sep 2018 12:25 )  x     / 0.03 ng/mL / x     / x     / x      CARDIAC MARKERS ( 24 Sep 2018 09:13 )  x     / 0.04 ng/mL / x     / x     / x                                9.7    10.21 )-----------( 120      ( 25 Sep 2018 06:47 )             30.2     Daily Height in cm: 177.8 (24 Sep 2018 12:41)    Daily   Drug Dosing Weight  Height (cm): 177.8 (24 Sep 2018 12:41)  Weight (kg): 99.9 (24 Sep 2018 12:41)  BMI (kg/m2): 31.6 (24 Sep 2018 12:41)  BSA (m2): 2.17 (24 Sep 2018 12:41)  CAPILLARY BLOOD GLUCOSE      POCT Blood Glucose.: 193 mg/dL (25 Sep 2018 11:26)  POCT Blood Glucose.: 117 mg/dL (25 Sep 2018 07:45)  POCT Blood Glucose.: 163 mg/dL (24 Sep 2018 20:58)  POCT Blood Glucose.: 181 mg/dL (24 Sep 2018 18:33)  POCT Blood Glucose.: 151 mg/dL (24 Sep 2018 17:29)  POCT Blood Glucose.: 130 mg/dL (24 Sep 2018 16:06)    I&O's Summary    25 Sep 2018 07:  -  26 Sep 2018 07:00  --------------------------------------------------------  IN: 0 mL / OUT: 301 mL / NET: -301 mL    26 Sep 2018 07:  -  26 Sep 2018 10:08  --------------------------------------------------------  IN: 1000 mL / OUT: 0 mL / NET: 1000 mL      Urinalysis Basic - ( 24 Sep 2018 10:34 )    Color: Yellow / Appearance: Clear / S.025 / pH: x  Gluc: x / Ketone: Negative  / Bili: Negative / Urobili: 0.2 mg/dL   Blood: x / Protein: 30 mg/dL / Nitrite: Positive   Leuk Esterase: Large / RBC: 25-50 /HPF / WBC >50 /HPF   Sq Epi: x / Non Sq Epi: x / Bacteria: x      LIVER FUNCTIONS - ( 24 Sep 2018 09:13 )  Alb: 3.8 g/dL / Pro: 6.3 g/dL / ALK PHOS: 58 U/L / ALT: 10 U/L / AST: 34 U/L / GGT: x               RADIOLOGY & ADDITIONAL TESTS:    Imaging Personally Reviewed:  [x ] YES  [ ] NO    MEDICATIONS  (STANDING):  aspirin 325 milliGRAM(s) Oral daily  dextrose 5%. 1000 milliLiter(s) (50 mL/Hr) IV Continuous <Continuous>  docusate sodium 100 milliGRAM(s) Oral three times a day  donepezil 10 milliGRAM(s) Oral at bedtime  enoxaparin Injectable 40 milliGRAM(s) SubCutaneous every 24 hours  insulin lispro (HumaLOG) corrective regimen sliding scale   SubCutaneous three times a day before meals  memantine 10 milliGRAM(s) Oral two times a day  oxybutynin 10 milliGRAM(s) Oral daily  pantoprazole    Tablet 40 milliGRAM(s) Oral before breakfast  pioglitazone 45 milliGRAM(s) Oral daily  vancomycin  IVPB 1000 milliGRAM(s) IV Intermittent every 12 hours    MEDICATIONS  (PRN):  acetaminophen   Tablet .. 650 milliGRAM(s) Oral every 6 hours PRN Temp greater or equal to 38C (100.4F), Mild Pain (1 - 3)  polyethylene glycol 3350 17 Gram(s) Oral daily PRN Constipation

## 2018-09-27 DIAGNOSIS — G93.41 METABOLIC ENCEPHALOPATHY: ICD-10-CM

## 2018-09-27 DIAGNOSIS — N39.0 URINARY TRACT INFECTION, SITE NOT SPECIFIED: ICD-10-CM

## 2018-09-27 LAB
ALBUMIN SERPL ELPH-MCNC: 3.2 G/DL — LOW (ref 3.5–5.2)
ALP SERPL-CCNC: 58 U/L — SIGNIFICANT CHANGE UP (ref 30–115)
ALT FLD-CCNC: 10 U/L — SIGNIFICANT CHANGE UP (ref 0–41)
ANION GAP SERPL CALC-SCNC: 14 MMOL/L — SIGNIFICANT CHANGE UP (ref 7–14)
AST SERPL-CCNC: 12 U/L — SIGNIFICANT CHANGE UP (ref 0–41)
BILIRUB SERPL-MCNC: 0.4 MG/DL — SIGNIFICANT CHANGE UP (ref 0.2–1.2)
BUN SERPL-MCNC: 23 MG/DL — HIGH (ref 10–20)
CALCIUM SERPL-MCNC: 8.6 MG/DL — SIGNIFICANT CHANGE UP (ref 8.5–10.1)
CHLORIDE SERPL-SCNC: 105 MMOL/L — SIGNIFICANT CHANGE UP (ref 98–110)
CO2 SERPL-SCNC: 22 MMOL/L — SIGNIFICANT CHANGE UP (ref 17–32)
CREAT SERPL-MCNC: 1.1 MG/DL — SIGNIFICANT CHANGE UP (ref 0.7–1.5)
GLUCOSE BLDC GLUCOMTR-MCNC: 174 MG/DL — HIGH (ref 70–99)
GLUCOSE BLDC GLUCOMTR-MCNC: 189 MG/DL — HIGH (ref 70–99)
GLUCOSE BLDC GLUCOMTR-MCNC: 248 MG/DL — HIGH (ref 70–99)
GLUCOSE BLDC GLUCOMTR-MCNC: 250 MG/DL — HIGH (ref 70–99)
GLUCOSE SERPL-MCNC: 149 MG/DL — HIGH (ref 70–99)
HCT VFR BLD CALC: 30.8 % — LOW (ref 42–52)
HGB BLD-MCNC: 9.8 G/DL — LOW (ref 14–18)
MCHC RBC-ENTMCNC: 27.8 PG — SIGNIFICANT CHANGE UP (ref 27–31)
MCHC RBC-ENTMCNC: 31.8 G/DL — LOW (ref 32–37)
MCV RBC AUTO: 87.5 FL — SIGNIFICANT CHANGE UP (ref 80–94)
NRBC # BLD: 0 /100 WBCS — SIGNIFICANT CHANGE UP (ref 0–0)
PLATELET # BLD AUTO: 130 K/UL — SIGNIFICANT CHANGE UP (ref 130–400)
POTASSIUM SERPL-MCNC: 3.9 MMOL/L — SIGNIFICANT CHANGE UP (ref 3.5–5)
POTASSIUM SERPL-SCNC: 3.9 MMOL/L — SIGNIFICANT CHANGE UP (ref 3.5–5)
PROT SERPL-MCNC: 5.5 G/DL — LOW (ref 6–8)
RBC # BLD: 3.52 M/UL — LOW (ref 4.7–6.1)
RBC # FLD: 14.9 % — HIGH (ref 11.5–14.5)
SODIUM SERPL-SCNC: 141 MMOL/L — SIGNIFICANT CHANGE UP (ref 135–146)
WBC # BLD: 6.53 K/UL — SIGNIFICANT CHANGE UP (ref 4.8–10.8)
WBC # FLD AUTO: 6.53 K/UL — SIGNIFICANT CHANGE UP (ref 4.8–10.8)

## 2018-09-27 RX ADMIN — Medication 10 MILLIGRAM(S): at 11:58

## 2018-09-27 RX ADMIN — Medication 2: at 17:27

## 2018-09-27 RX ADMIN — Medication 2: at 13:58

## 2018-09-27 RX ADMIN — Medication 1 TABLET(S): at 17:28

## 2018-09-27 RX ADMIN — PIOGLITAZONE HYDROCHLORIDE 45 MILLIGRAM(S): 15 TABLET ORAL at 11:58

## 2018-09-27 RX ADMIN — MEMANTINE HYDROCHLORIDE 10 MILLIGRAM(S): 10 TABLET ORAL at 17:28

## 2018-09-27 RX ADMIN — DONEPEZIL HYDROCHLORIDE 10 MILLIGRAM(S): 10 TABLET, FILM COATED ORAL at 22:10

## 2018-09-27 RX ADMIN — Medication 100 MILLIGRAM(S): at 05:32

## 2018-09-27 RX ADMIN — Medication 1: at 09:19

## 2018-09-27 RX ADMIN — Medication 250 MILLIGRAM(S): at 05:31

## 2018-09-27 RX ADMIN — Medication 100 MILLIGRAM(S): at 22:10

## 2018-09-27 RX ADMIN — Medication 325 MILLIGRAM(S): at 11:58

## 2018-09-27 RX ADMIN — MEMANTINE HYDROCHLORIDE 10 MILLIGRAM(S): 10 TABLET ORAL at 05:32

## 2018-09-27 RX ADMIN — PANTOPRAZOLE SODIUM 40 MILLIGRAM(S): 20 TABLET, DELAYED RELEASE ORAL at 09:21

## 2018-09-27 RX ADMIN — ENOXAPARIN SODIUM 40 MILLIGRAM(S): 100 INJECTION SUBCUTANEOUS at 15:17

## 2018-09-27 NOTE — PROGRESS NOTE ADULT - SUBJECTIVE AND OBJECTIVE BOX
DIONNE CURIEL  85y  Male      Patient is a 85y old Male who presents with a chief complaint of weakness (24 Sep 2018 16:19)      INTERVAL HPI/OVERNIGHT EVENTS:  Patient was febrile in the evening again today    REVIEW OF SYSTEMS:  CONSTITUTIONAL: fever and fatigue  EYES: No eye pain, visual disturbances, or discharge  ENMT:  No difficulty hearing, tinnitus, vertigo; No sinus or throat pain  NECK: No pain or stiffness  RESPIRATORY: No cough, wheezing, chills or hemoptysis; No shortness of breath  CARDIOVASCULAR: No chest pain, palpitations, dizziness, or leg swelling  GASTROINTESTINAL: constipation.  GENITOURINARY: No dysuria, frequency, hematuria, or incontinence  NEUROLOGICAL: No headaches, memory loss, loss of strength, numbness, or tremors  SKIN: No itching, burning, rashes, or lesions   LYMPH NODES: No enlarged glands  ENDOCRINE: No heat or cold intolerance; No hair loss  MUSCULOSKELETAL: No joint pain or swelling; No muscle, back, or extremity pain  PSYCHIATRIC: No depression, anxiety, mood swings, or difficulty sleeping  HEME/LYMPH: No easy bruising, or bleeding gums  ALLERY AND IMMUNOLOGIC: No hives or eczema    Vital Signs Last 24 Hrs  T(C): 37.4 (27 Sep 2018 05:50), Max: 38.1 (26 Sep 2018 17:45)  T(F): 99.3 (27 Sep 2018 05:50), Max: 100.5 (26 Sep 2018 17:45)  HR: 77 (27 Sep 2018 05:50) (69 - 95)  BP: 129/69 (27 Sep 2018 05:50) (120/82 - 133/62)  BP(mean): --  RR: 16 (27 Sep 2018 05:50) (16 - 16)  SpO2: --      PHYSICAL EXAM:  GENERAL: NAD, well-groomed, well-developed  HEAD:  Atraumatic, Normocephalic  EYES: EOMI, PERRLA, conjunctiva and sclera clear  ENMT: No tonsillar erythema, exudates, or enlargement; Moist mucous membranes, Good dentition, No lesions  NECK: Supple, No JVD, Normal thyroid  NERVOUS SYSTEM:  Alert & Oriented X3, Good concentration; Motor Strength 5/5 B/L upper and lower extremities; DTRs 2+ intact and symmetric  CHEST/LUNG: Clear to percussion bilaterally; No rales, rhonchi, wheezing, or rubs  HEART: Regular rate and rhythm; No murmurs, rubs, or gallops  ABDOMEN: Soft, Nontender, Nondistended; Bowel sounds present  EXTREMITIES:  2+ Peripheral Pulses, No clubbing, cyanosis, +1 edema  LYMPH: No lymphadenopathy noted  SKIN: No rashes or lesions    Consultant(s) Notes Reviewed:  [x ] YES  [ ] NO  Care Discussed with Consultants/Other Providers [ x] YES  [ ] NO    LAB:        141  |  105  |  23<H>  ----------------------------<  149<H>  3.9   |  22  |  1.1    Ca    8.6      27 Sep 2018 06:51    TPro  5.5<L>  /  Alb  3.2<L>  /  TBili  0.4  /  DBili  x   /  AST  12  /  ALT  10  /  AlkPhos  58      TPro  6.3  /  Alb  3.8  /  TBili  0.9  /  DBili  x   /  AST  34  /  ALT  10  /  AlkPhos  58  24    CARDIAC MARKERS ( 25 Sep 2018 06:47 )  x     / 0.04 ng/mL / x     / x     / x      CARDIAC MARKERS ( 24 Sep 2018 21:53 )  x     / 0.04 ng/mL / x     / x     / x      CARDIAC MARKERS ( 24 Sep 2018 19:03 )  x     / 0.04 ng/mL / x     / x     / x      CARDIAC MARKERS ( 24 Sep 2018 12:25 )  x     / 0.03 ng/mL / x     / x     / x      CARDIAC MARKERS ( 24 Sep 2018 09:13 )  x     / 0.04 ng/mL / x     / x     / x                         9.8    6.53  )-----------( 130      ( 27 Sep 2018 06:51 )             30.8       Daily Height in cm: 177.8 (24 Sep 2018 12:41)    Daily   Drug Dosing Weight  Height (cm): 177.8 (24 Sep 2018 12:41)  Weight (kg): 99.9 (24 Sep 2018 12:41)  BMI (kg/m2): 31.6 (24 Sep 2018 12:41)  BSA (m2): 2.17 (24 Sep 2018 12:41)  CAPILLARY BLOOD GLUCOSE      POCT Blood Glucose.: 193 mg/dL (25 Sep 2018 11:26)  POCT Blood Glucose.: 117 mg/dL (25 Sep 2018 07:45)  POCT Blood Glucose.: 163 mg/dL (24 Sep 2018 20:58)  POCT Blood Glucose.: 181 mg/dL (24 Sep 2018 18:33)  POCT Blood Glucose.: 151 mg/dL (24 Sep 2018 17:29)  POCT Blood Glucose.: 130 mg/dL (24 Sep 2018 16:06)    I&O's Summary    25 Sep 2018 07:  -  26 Sep 2018 07:00  --------------------------------------------------------  IN: 0 mL / OUT: 301 mL / NET: -301 mL    26 Sep 2018 07:01  -  26 Sep 2018 10:08  --------------------------------------------------------  IN: 1000 mL / OUT: 0 mL / NET: 1000 mL      Urinalysis Basic - ( 24 Sep 2018 10:34 )    Color: Yellow / Appearance: Clear / S.025 / pH: x  Gluc: x / Ketone: Negative  / Bili: Negative / Urobili: 0.2 mg/dL   Blood: x / Protein: 30 mg/dL / Nitrite: Positive   Leuk Esterase: Large / RBC: 25-50 /HPF / WBC >50 /HPF   Sq Epi: x / Non Sq Epi: x / Bacteria: x      LIVER FUNCTIONS - ( 24 Sep 2018 09:13 )  Alb: 3.8 g/dL / Pro: 6.3 g/dL / ALK PHOS: 58 U/L / ALT: 10 U/L / AST: 34 U/L / GGT: x               RADIOLOGY & ADDITIONAL TESTS:    Imaging Personally Reviewed:  [x ] YES  [ ] NO    MEDICATIONS  (STANDING):  aspirin 325 milliGRAM(s) Oral daily  dextrose 5%. 1000 milliLiter(s) (50 mL/Hr) IV Continuous <Continuous>  docusate sodium 100 milliGRAM(s) Oral three times a day  donepezil 10 milliGRAM(s) Oral at bedtime  enoxaparin Injectable 40 milliGRAM(s) SubCutaneous every 24 hours  insulin lispro (HumaLOG) corrective regimen sliding scale   SubCutaneous three times a day before meals  memantine 10 milliGRAM(s) Oral two times a day  oxybutynin 10 milliGRAM(s) Oral daily  pantoprazole    Tablet 40 milliGRAM(s) Oral before breakfast  pioglitazone 45 milliGRAM(s) Oral daily  trimethoprim  160 mG/sulfamethoxazole 800 mG 1 Tablet(s) Oral two times a day    MEDICATIONS  (PRN):  acetaminophen   Tablet .. 650 milliGRAM(s) Oral every 6 hours PRN Temp greater or equal to 38C (100.4F), Mild Pain (1 - 3)  polyethylene glycol 3350 17 Gram(s) Oral daily PRN Constipation

## 2018-09-27 NOTE — PROGRESS NOTE ADULT - PROBLEM SELECTOR PLAN 1
IV abx - changed to bactrim by ID for 10 days  antipyretics  valdivia removed  off IVF - fluid overload

## 2018-09-27 NOTE — CONSULT NOTE ADULT - SUBJECTIVE AND OBJECTIVE BOX
DIONNE CURIEL 85yMalePatient is a 85y old  Male who presents with a chief complaint of weaknes (26 Sep 2018 10:03)      Patient has history of:  Avandia (Unknown)  Lipitor (Unknown)  metformin (Unknown)    PMH - UTI - prior valdivia    FSH - not relevant    ROS - not contributory     Patient treated with:    PHYSICAL EXAM  T(F): 99.3 (18 @ 05:50), Max: 100.5 (18 @ 17:45)  HR: 77 (18 @ 05:50) (69 - 95)  BP: 129/69 (18 @ 05:50) (120/82 - 133/62)  RR: 16 (18 @ 05:50) (16 - 16)  SpO2: --  Daily     Daily Weight in k.1 (26 Sep 2018 07:21)    awake and alert   HEENT: normal, no nuchal rigidity  Cor: RSR Nl S1 S2  Lungs: clear  Abdomen: Nontender, Nl BS,   Ext: No phlebitis     LAB & RADIOLOGIC RESULTS:                        9.7    10.21 )-----------( 120      ( 25 Sep 2018 06:47 )             30.2             143  |  106  |  24<H>  ----------------------------<  111<H>  3.9   |  23  |  1.2    Ca    8.2<L>      25 Sep 2018 06:47    Progress Note Adult-Hospitalist Attending [ALEXANDRIA Sifuentes] (18 @ 10:03)  Progress Note Adult-Hospitalist Attending [ALEXANDRIA Sifuentes] (18 @ 12:15)  Physical Therapy Initial Evaluation Adult [SAMIR EncinasnguyenBatool] (18 @ 11:00)  Consult Note Adult-Physiatry Attending [CAROL Enciso] (18 @ 16:19)  H&P Adult [ALLIE Dobbins] (18 @ 13:34)  Patient Profile Adult [DEEJAY Espinoza] (18 @ 13:01)  Outpatient Clinical Summary - Medical  Group [O. Provider] (09-24-18 @ 12:32)  ED ADULT Nurse Note [DANIEL Reis] (18 @ 09:27)  ED ADULT Triage Note [GILLIAN Bryson] (18 @ 08:27)  ED Provider Note [DANIEL Bean] (18 @ 08:25)  Outpatient Clinical Summary - Medical  Group [O. Provider] (18 @ 08:13)  Outpatient Clinical Summary - Medical  Group [O. Provider] (18 @ 03:25)  Discharge Note Adult [CAROL AlanCAROL] (18 @ 12:52)  Progress Note Adult-Gastroenterology PA/Attending [DANIEL Wright] (18 @ 12:52)  Physical Therapy Initial Evaluation Adult [DANIEL Mj] (18 @ 10:22)  Consult Note Adult-Physiatry Attending [CAROL Enciso] (18 @ 09:57)  Progress Note Adult-Hospitalist Attending [CAROL Davis] (18 @ 09:43)  Outpatient Clinical Summary - Medical  Group [O. Provider] (18 @ 20:48)  Patient Profile Adult [YUDY Ross] (18 @ 20:38)  Outpatient Clinical Summary - Medical  Group [O. Provider] (18 @ 20:11)  Provider Contact Note (Other) [DEEJAY Perez] (18 @ 19:53)  ED ADULT Nurse Reassessment Note [DANIEL Comer] (18 @ 19:06)  ED ADULT Nurse Reassessment Note [DANIEL Comer] (18 @ 18:40)  H&P Adult [CAROL Davis] (18 @ 18:16)  ED ADULT Nurse Note [DEEJAY Vivar] (18 @ 17:31)  ED ADULT Nurse Reassessment Note [DANIEL Comer] (18 @ 17:30)  ED Provider Note [YUDY Acevedo] (18 @ 16:22)  ED ADULT Triage Note [LUIS Underwood] (18 @ 14:23)  Discharge Note Adult [ALEXANDRIA Mercado] (18 @ 13:54)  Physical Therapy Initial Evaluation Adult [DANIEL Barker] (18 @ 11:56)  Progress Note Adult-Hospitalist Attending [CHARLIE Tavarez] (18 @ 10:01)  Patient Profile Adult [MAKENNA Blank] (18 @ 21:02)  H&P Adult [CAMILLE Purvis] (18 @ 19:55)  Outpatient Clinical Summary - Medical  Group [O. Provider] (18 @ 19:21)  ED Provider Note [ANTHONY Arvizu] (18 @ 14:49)  ED ADULT Triage Note [DANIEL Comer] (18 @ 11:42)  ED ADULT Nurse Note [DANIEL Comer] (18 @ 11:35)  Outpatient Clinical Summary - Medical  Group [O. Provider] (18 @ 11:00)  ED Provider Note [CAMILLE Arvizu] (18 @ 15:23)  ED Clerical [ALEXANDRIA Talley] (18 @ 12:43)  ED ADULT Nurse Note [CHARLIE Peck] (18 @ 11:33)  ED ADULT Triage Note [GILLIAN Philippe] (18 @ 10:41)  Outpatient Clinical Summary - Medical  Group [O. Provider] (18 @ 10:29)  ED Provider Note [CAROL Arvizu] (18 @ 16:45)  ED ADULT Nurse Note [GILLIAN Singh] (18 @ 12:23)  ED ADULT Triage Note [NIKOLE Powell] (18 @ 11:02)  Outpatient Clinical Summary - Medical  Group [O. Provider] (18 @ 10:55)    Culture - Blood (collected 18 @ 10:10)  Source: .Blood Blood-Peripheral  Preliminary Report (18 @ 01:01):    No growth to date.

## 2018-09-27 NOTE — PROGRESS NOTE ADULT - ASSESSMENT
Patient admitted with recurrent UTI/ sepsis POA secondary to UTI  found to be impacted (chronic problem) - pt had several BM's s/p enemas    Patient febrile yesterday  anticipate d/c in am

## 2018-09-28 ENCOUNTER — TRANSCRIPTION ENCOUNTER (OUTPATIENT)
Age: 83
End: 2018-09-28

## 2018-09-28 ENCOUNTER — APPOINTMENT (OUTPATIENT)
Dept: CARDIOLOGY | Facility: CLINIC | Age: 83
End: 2018-09-28

## 2018-09-28 VITALS
HEART RATE: 68 BPM | TEMPERATURE: 99 F | SYSTOLIC BLOOD PRESSURE: 129 MMHG | DIASTOLIC BLOOD PRESSURE: 61 MMHG | RESPIRATION RATE: 18 BRPM

## 2018-09-28 LAB
GLUCOSE BLDC GLUCOMTR-MCNC: 171 MG/DL — HIGH (ref 70–99)
GLUCOSE BLDC GLUCOMTR-MCNC: 225 MG/DL — HIGH (ref 70–99)

## 2018-09-28 RX ORDER — INFLUENZA VIRUS VACCINE 15; 15; 15; 15 UG/.5ML; UG/.5ML; UG/.5ML; UG/.5ML
0.5 SUSPENSION INTRAMUSCULAR ONCE
Qty: 0 | Refills: 0 | Status: COMPLETED | OUTPATIENT
Start: 2018-09-28 | End: 2018-09-28

## 2018-09-28 RX ORDER — LOSARTAN POTASSIUM 100 MG/1
1 TABLET, FILM COATED ORAL
Qty: 0 | Refills: 0 | COMMUNITY

## 2018-09-28 RX ORDER — CARVEDILOL PHOSPHATE 80 MG/1
1 CAPSULE, EXTENDED RELEASE ORAL
Qty: 0 | Refills: 0 | COMMUNITY

## 2018-09-28 RX ADMIN — PANTOPRAZOLE SODIUM 40 MILLIGRAM(S): 20 TABLET, DELAYED RELEASE ORAL at 09:33

## 2018-09-28 RX ADMIN — PIOGLITAZONE HYDROCHLORIDE 45 MILLIGRAM(S): 15 TABLET ORAL at 11:43

## 2018-09-28 RX ADMIN — Medication 1 TABLET(S): at 05:23

## 2018-09-28 RX ADMIN — INFLUENZA VIRUS VACCINE 0.5 MILLILITER(S): 15; 15; 15; 15 SUSPENSION INTRAMUSCULAR at 11:43

## 2018-09-28 RX ADMIN — Medication 325 MILLIGRAM(S): at 11:43

## 2018-09-28 RX ADMIN — Medication 1: at 09:32

## 2018-09-28 RX ADMIN — Medication 100 MILLIGRAM(S): at 05:23

## 2018-09-28 RX ADMIN — MEMANTINE HYDROCHLORIDE 10 MILLIGRAM(S): 10 TABLET ORAL at 05:23

## 2018-09-28 RX ADMIN — Medication 10 MILLIGRAM(S): at 11:43

## 2018-09-28 NOTE — DISCHARGE NOTE ADULT - HOSPITAL COURSE
Patient admitted with recurrent UTI/ sepsis POA secondary to UTI  found to be impacted (chronic problem) - pt had several BM's s/p enemas     Problem/Plan - 1:  ·  Problem: Sepsis secondary to UTI.  Plan: IV abx - changed to bactrim by ID for 10 days  antipyretics  valdivia removed  off IVF - fluid overload.      Problem/Plan - 2:  ·  Problem: Constipation, unspecified constipation type.  Plan: and impaction  -miralax, enemas, colace  -ambulate.      Problem/Plan - 3:  ·  Problem: Essential hypertension.  Plan: not on medication.      Problem/Plan - 4:  ·  Problem: Type 2 diabetes mellitus without complication, without long-term current use of insulin.  Plan: well controlled on home meds.      Problem/Plan - 5:  ·  Problem: Alzheimer's dementia without behavioral disturbance, unspecified timing of dementia onset.  Plan: well controlled on home meds.      Problem/Plan - 6:  Problem: CAD (coronary artery disease). Plan: well controlled on home meds.     Problem/Plan - 7:  ·  Problem: Troponin level elevated.  Plan: likely secondary to sepsis  no tele events- d/c tele.      Problem/Plan - 8:  ·  Problem: Metabolic encephalopathy.  Plan: multifactorial - secondary to sepsis and alzheimer's dementia  at baseline.     D/C planning took over 60 minutes.  Discussed plan in detail with pt and daughter.

## 2018-09-28 NOTE — DISCHARGE NOTE ADULT - PLAN OF CARE
prevent recurrence take antibiotics as perscribed  take over the counter probiotics while taking antibiotics  follow up with PMD resolved take medication as prescribed and follow up with PMD ***YOU ARE NO LONGER TAKING YOUR LOSARTAN AND CARVEDILOL.  FOLLOW UP WITH YOUR PMD TO DETERMINE IF/WHEN YOU SHOULD RESTART***

## 2018-09-28 NOTE — PROGRESS NOTE ADULT - SUBJECTIVE AND OBJECTIVE BOX
DIONNE CURIEL  85y  Male      Patient is a 85y old Male who presents with a chief complaint of weakness (24 Sep 2018 16:19)      INTERVAL HPI/OVERNIGHT EVENTS:  no complaints  lying comfortably in bed  afebrile for over 24hrs    REVIEW OF SYSTEMS:  CONSTITUTIONAL: NAD, denies fatigue or discomfort  EYES: No eye pain, visual disturbances, or discharge  ENMT:  No difficulty hearing, tinnitus, vertigo; No sinus or throat pain  NECK: No pain or stiffness  RESPIRATORY: No cough, wheezing, chills or hemoptysis; No shortness of breath  CARDIOVASCULAR: No chest pain, palpitations, dizziness, or leg swelling  GASTROINTESTINAL: constipation.  GENITOURINARY: No dysuria, frequency, hematuria, or incontinence  NEUROLOGICAL: No headaches, memory loss, loss of strength, numbness, or tremors  SKIN: No itching, burning, rashes, or lesions   LYMPH NODES: No enlarged glands  ENDOCRINE: No heat or cold intolerance; No hair loss  MUSCULOSKELETAL: No joint pain or swelling; No muscle, back, or extremity pain  PSYCHIATRIC: No depression, anxiety, mood swings, or difficulty sleeping  HEME/LYMPH: No easy bruising, or bleeding gums  ALLERY AND IMMUNOLOGIC: No hives or eczema    Vital Signs Last 24 Hrs  T(C): 37.4 (28 Sep 2018 05:29), Max: 37.4 (27 Sep 2018 22:20)  T(F): 99.4 (28 Sep 2018 05:29), Max: 99.4 (28 Sep 2018 05:29)  HR: 68 (28 Sep 2018 05:29) (63 - 68)  BP: 129/61 (28 Sep 2018 05:29) (94/52 - 129/61)  BP(mean): --  RR: 18 (28 Sep 2018 05:29) (16 - 18)  SpO2: --      PHYSICAL EXAM:  GENERAL: NAD, well-groomed, well-developed  HEAD:  Atraumatic, Normocephalic  EYES: EOMI, PERRLA, conjunctiva and sclera clear  ENMT: No tonsillar erythema, exudates, or enlargement; Moist mucous membranes, Good dentition, No lesions  NECK: Supple, No JVD, Normal thyroid  NERVOUS SYSTEM:  Alert & Oriented X3, Good concentration; Motor Strength 5/5 B/L upper and lower extremities; DTRs 2+ intact and symmetric  CHEST/LUNG: Clear to percussion bilaterally; No rales, rhonchi, wheezing, or rubs  HEART: Regular rate and rhythm; No murmurs, rubs, or gallops  ABDOMEN: Soft, Nontender, Nondistended; Bowel sounds present  EXTREMITIES:  2+ Peripheral Pulses, No clubbing, cyanosis, +1 edema  LYMPH: No lymphadenopathy noted  SKIN: No rashes or lesions    Consultant(s) Notes Reviewed:  [x ] YES  [ ] NO  Care Discussed with Consultants/Other Providers [ x] YES  [ ] NO    LAB:   NO NEW LABS        141  |  105  |  23<H>  ----------------------------<  149<H>  3.9   |  22  |  1.1    Ca    8.6      27 Sep 2018 06:51    TPro  5.5<L>  /  Alb  3.2<L>  /  TBili  0.4  /  DBili  x   /  AST  12  /  ALT  10  /  AlkPhos  58  27    TPro  6.3  /  Alb  3.8  /  TBili  0.9  /  DBili  x   /  AST  34  /  ALT  10  /  AlkPhos  58  0924    CARDIAC MARKERS ( 25 Sep 2018 06:47 )  x     / 0.04 ng/mL / x     / x     / x      CARDIAC MARKERS ( 24 Sep 2018 21:53 )  x     / 0.04 ng/mL / x     / x     / x      CARDIAC MARKERS ( 24 Sep 2018 19:03 )  x     / 0.04 ng/mL / x     / x     / x      CARDIAC MARKERS ( 24 Sep 2018 12:25 )  x     / 0.03 ng/mL / x     / x     / x      CARDIAC MARKERS ( 24 Sep 2018 09:13 )  x     / 0.04 ng/mL / x     / x     / x                         9.8    6.53  )-----------( 130      ( 27 Sep 2018 06:51 )             30.8       Daily Height in cm: 177.8 (24 Sep 2018 12:41)    Daily   Drug Dosing Weight  Height (cm): 177.8 (24 Sep 2018 12:41)  Weight (kg): 99.9 (24 Sep 2018 12:41)  BMI (kg/m2): 31.6 (24 Sep 2018 12:41)  BSA (m2): 2.17 (24 Sep 2018 12:41)  CAPILLARY BLOOD GLUCOSE      POCT Blood Glucose.: 193 mg/dL (25 Sep 2018 11:26)  POCT Blood Glucose.: 117 mg/dL (25 Sep 2018 07:45)  POCT Blood Glucose.: 163 mg/dL (24 Sep 2018 20:58)  POCT Blood Glucose.: 181 mg/dL (24 Sep 2018 18:33)  POCT Blood Glucose.: 151 mg/dL (24 Sep 2018 17:29)  POCT Blood Glucose.: 130 mg/dL (24 Sep 2018 16:06)    I&O's Summary    25 Sep 2018 07:  -  26 Sep 2018 07:00  --------------------------------------------------------  IN: 0 mL / OUT: 301 mL / NET: -301 mL    26 Sep 2018 07:01  -  26 Sep 2018 10:08  --------------------------------------------------------  IN: 1000 mL / OUT: 0 mL / NET: 1000 mL      Urinalysis Basic - ( 24 Sep 2018 10:34 )    Color: Yellow / Appearance: Clear / S.025 / pH: x  Gluc: x / Ketone: Negative  / Bili: Negative / Urobili: 0.2 mg/dL   Blood: x / Protein: 30 mg/dL / Nitrite: Positive   Leuk Esterase: Large / RBC: 25-50 /HPF / WBC >50 /HPF   Sq Epi: x / Non Sq Epi: x / Bacteria: x      LIVER FUNCTIONS - ( 24 Sep 2018 09:13 )  Alb: 3.8 g/dL / Pro: 6.3 g/dL / ALK PHOS: 58 U/L / ALT: 10 U/L / AST: 34 U/L / GGT: x               RADIOLOGY & ADDITIONAL TESTS:    Imaging Personally Reviewed:  [x ] YES  [ ] NO    MEDICATIONS  (STANDING):  aspirin 325 milliGRAM(s) Oral daily  dextrose 5%. 1000 milliLiter(s) (50 mL/Hr) IV Continuous <Continuous>  docusate sodium 100 milliGRAM(s) Oral three times a day  donepezil 10 milliGRAM(s) Oral at bedtime  enoxaparin Injectable 40 milliGRAM(s) SubCutaneous every 24 hours  insulin lispro (HumaLOG) corrective regimen sliding scale   SubCutaneous three times a day before meals  memantine 10 milliGRAM(s) Oral two times a day  oxybutynin 10 milliGRAM(s) Oral daily  pantoprazole    Tablet 40 milliGRAM(s) Oral before breakfast  pioglitazone 45 milliGRAM(s) Oral daily  trimethoprim  160 mG/sulfamethoxazole 800 mG 1 Tablet(s) Oral two times a day    MEDICATIONS  (PRN):  acetaminophen   Tablet .. 650 milliGRAM(s) Oral every 6 hours PRN Temp greater or equal to 38C (100.4F), Mild Pain (1 - 3)  polyethylene glycol 3350 17 Gram(s) Oral daily PRN Constipation

## 2018-09-28 NOTE — DISCHARGE NOTE ADULT - PATIENT PORTAL LINK FT
You can access the [facility] Patient Portal, offered by [facility], by registering with the following website: [email or web address]

## 2018-09-28 NOTE — DISCHARGE NOTE ADULT - CARE PLAN
Principal Discharge DX:	Sepsis secondary to UTI  Goal:	prevent recurrence  Assessment and plan of treatment:	take antibiotics as perscribed  take over the counter probiotics while taking antibiotics  follow up with PMD  Secondary Diagnosis:	Septic encephalopathy  Assessment and plan of treatment:	resolved  Secondary Diagnosis:	Type 2 diabetes mellitus without complication, without long-term current use of insulin  Assessment and plan of treatment:	take medication as prescribed and follow up with PMD  Secondary Diagnosis:	Essential hypertension  Assessment and plan of treatment:	***YOU ARE NO LONGER TAKING YOUR LOSARTAN AND CARVEDILOL.  FOLLOW UP WITH YOUR PMD TO DETERMINE IF/WHEN YOU SHOULD RESTART***  Secondary Diagnosis:	High cholesterol  Assessment and plan of treatment:	take medication as prescribed and follow up with PMD  Secondary Diagnosis:	Constipation, unspecified constipation type  Assessment and plan of treatment:	take medication as prescribed and follow up with PMD

## 2018-09-28 NOTE — DISCHARGE NOTE ADULT - SECONDARY DIAGNOSIS.
Septic encephalopathy Type 2 diabetes mellitus without complication, without long-term current use of insulin Essential hypertension High cholesterol Constipation, unspecified constipation type

## 2018-09-28 NOTE — DISCHARGE NOTE ADULT - CARE PROVIDER_API CALL
Geremias Acuña), Internal Medicine  [address] [place] [id number]  Phone: [phone]  Fax: [phone]

## 2018-09-28 NOTE — DISCHARGE NOTE ADULT - MEDICATION SUMMARY - MEDICATIONS TO TAKE
I will START or STAY ON the medications listed below when I get home from the hospital:    aspirin 325 mg oral tablet  -- 1 tab(s) by mouth once a day  -- Indication: For home med    pioglitazone 45 mg oral tablet  -- 1 tab(s) by mouth once a day  -- Indication: For diabetes    glipiZIDE 10 mg oral tablet, extended release  -- 2 tab(s) by mouth 2 times a day  -- Indication: For diabetes    Welchol 625 mg oral tablet  -- 3 tab(s) by mouth 2 times a day  -- Indication: For Cholesterol    donepezil 10 mg oral tablet  -- orally once a day  -- Indication: For dementia    Colace 100 mg oral capsule  -- Indication: For Constipation    polyethylene glycol 3350 oral powder for reconstitution  -- 17 gram(s) by mouth once a day, As Needed  -- Indication: For Constipation    memantine 10 mg oral tablet  -- 1 tab(s) by mouth 2 times a day  -- Indication: For dementia    omeprazole 20 mg oral delayed release tablet  -- 1 tab(s) by mouth 2 times a day  -- Indication: For gerd    sulfamethoxazole-trimethoprim 800 mg-160 mg oral tablet  -- 1 tab(s) by mouth 2 times a day  -- Indication: For UTI (urinary tract infection)    oxybutynin 10 mg/24 hr oral tablet, extended release  -- orally once a day (at bedtime)  -- Indication: For bph

## 2018-09-28 NOTE — DISCHARGE NOTE ADULT - MEDICATION SUMMARY - MEDICATIONS TO STOP TAKING
I will STOP taking the medications listed below when I get home from the hospital:    losartan 100 mg oral tablet  -- 1 tab(s) by mouth once a day    carvedilol 12.5 mg oral tablet  -- 1 tab(s) by mouth 2 times a day

## 2018-09-28 NOTE — PROGRESS NOTE ADULT - ASSESSMENT
Patient admitted with recurrent UTI/ sepsis POA secondary to UTI  found to be impacted (chronic problem) - pt had several BM's s/p enemas    Patient febrile yesterday   d/c today  discussed with pt's daughter

## 2018-09-30 LAB
CULTURE RESULTS: SIGNIFICANT CHANGE UP
SPECIMEN SOURCE: SIGNIFICANT CHANGE UP

## 2018-10-02 DIAGNOSIS — I25.10 ATHEROSCLEROTIC HEART DISEASE OF NATIVE CORONARY ARTERY WITHOUT ANGINA PECTORIS: ICD-10-CM

## 2018-10-02 DIAGNOSIS — E11.9 TYPE 2 DIABETES MELLITUS WITHOUT COMPLICATIONS: ICD-10-CM

## 2018-10-02 DIAGNOSIS — F02.80 DEMENTIA IN OTHER DISEASES CLASSIFIED ELSEWHERE, UNSPECIFIED SEVERITY, WITHOUT BEHAVIORAL DISTURBANCE, PSYCHOTIC DISTURBANCE, MOOD DISTURBANCE, AND ANXIETY: ICD-10-CM

## 2018-10-02 DIAGNOSIS — A41.9 SEPSIS, UNSPECIFIED ORGANISM: ICD-10-CM

## 2018-10-02 DIAGNOSIS — N39.0 URINARY TRACT INFECTION, SITE NOT SPECIFIED: ICD-10-CM

## 2018-10-02 DIAGNOSIS — E86.0 DEHYDRATION: ICD-10-CM

## 2018-10-02 DIAGNOSIS — G93.40 ENCEPHALOPATHY, UNSPECIFIED: ICD-10-CM

## 2018-10-02 DIAGNOSIS — I95.9 HYPOTENSION, UNSPECIFIED: ICD-10-CM

## 2018-10-02 DIAGNOSIS — N17.9 ACUTE KIDNEY FAILURE, UNSPECIFIED: ICD-10-CM

## 2018-10-02 DIAGNOSIS — E78.00 PURE HYPERCHOLESTEROLEMIA, UNSPECIFIED: ICD-10-CM

## 2018-10-02 DIAGNOSIS — I48.91 UNSPECIFIED ATRIAL FIBRILLATION: ICD-10-CM

## 2018-10-02 DIAGNOSIS — G30.9 ALZHEIMER'S DISEASE, UNSPECIFIED: ICD-10-CM

## 2018-10-02 DIAGNOSIS — K59.00 CONSTIPATION, UNSPECIFIED: ICD-10-CM

## 2018-10-02 DIAGNOSIS — Z23 ENCOUNTER FOR IMMUNIZATION: ICD-10-CM

## 2018-10-02 DIAGNOSIS — B95.61 METHICILLIN SUSCEPTIBLE STAPHYLOCOCCUS AUREUS INFECTION AS THE CAUSE OF DISEASES CLASSIFIED ELSEWHERE: ICD-10-CM

## 2018-10-02 DIAGNOSIS — Z79.84 LONG TERM (CURRENT) USE OF ORAL HYPOGLYCEMIC DRUGS: ICD-10-CM

## 2018-10-02 DIAGNOSIS — I10 ESSENTIAL (PRIMARY) HYPERTENSION: ICD-10-CM

## 2018-10-02 DIAGNOSIS — H91.91 UNSPECIFIED HEARING LOSS, RIGHT EAR: ICD-10-CM

## 2018-10-08 ENCOUNTER — INPATIENT (INPATIENT)
Facility: HOSPITAL | Age: 83
LOS: 3 days | Discharge: NURSING, REHAB OR LONG-TERM CARE | End: 2018-10-12
Attending: HOSPITALIST | Admitting: HOSPITALIST
Payer: MEDICARE

## 2018-10-08 VITALS
SYSTOLIC BLOOD PRESSURE: 140 MMHG | OXYGEN SATURATION: 96 % | WEIGHT: 225.09 LBS | TEMPERATURE: 101 F | RESPIRATION RATE: 16 BRPM | HEART RATE: 91 BPM | HEIGHT: 69 IN | DIASTOLIC BLOOD PRESSURE: 72 MMHG

## 2018-10-08 LAB
ALBUMIN SERPL ELPH-MCNC: 3.7 G/DL — SIGNIFICANT CHANGE UP (ref 3.5–5.2)
ALP SERPL-CCNC: 68 U/L — SIGNIFICANT CHANGE UP (ref 30–115)
ALT FLD-CCNC: 11 U/L — SIGNIFICANT CHANGE UP (ref 0–41)
ANION GAP SERPL CALC-SCNC: 19 MMOL/L — HIGH (ref 7–14)
APTT BLD: 33.5 SEC — SIGNIFICANT CHANGE UP (ref 27–39.2)
AST SERPL-CCNC: 16 U/L — SIGNIFICANT CHANGE UP (ref 0–41)
BASOPHILS # BLD AUTO: 0.02 K/UL — SIGNIFICANT CHANGE UP (ref 0–0.2)
BASOPHILS NFR BLD AUTO: 0.2 % — SIGNIFICANT CHANGE UP (ref 0–1)
BILIRUB SERPL-MCNC: 1 MG/DL — SIGNIFICANT CHANGE UP (ref 0.2–1.2)
BUN SERPL-MCNC: 14 MG/DL — SIGNIFICANT CHANGE UP (ref 10–20)
CALCIUM SERPL-MCNC: 8.8 MG/DL — SIGNIFICANT CHANGE UP (ref 8.5–10.1)
CHLORIDE SERPL-SCNC: 95 MMOL/L — LOW (ref 98–110)
CO2 SERPL-SCNC: 23 MMOL/L — SIGNIFICANT CHANGE UP (ref 17–32)
CREAT SERPL-MCNC: 1.1 MG/DL — SIGNIFICANT CHANGE UP (ref 0.7–1.5)
EOSINOPHIL # BLD AUTO: 0.04 K/UL — SIGNIFICANT CHANGE UP (ref 0–0.7)
EOSINOPHIL NFR BLD AUTO: 0.4 % — SIGNIFICANT CHANGE UP (ref 0–8)
GLUCOSE SERPL-MCNC: 134 MG/DL — HIGH (ref 70–99)
HCT VFR BLD CALC: 34.8 % — LOW (ref 42–52)
HGB BLD-MCNC: 11.1 G/DL — LOW (ref 14–18)
IMM GRANULOCYTES NFR BLD AUTO: 0.4 % — HIGH (ref 0.1–0.3)
INR BLD: 1.45 RATIO — HIGH (ref 0.65–1.3)
LACTATE SERPL-SCNC: 1.6 MMOL/L — SIGNIFICANT CHANGE UP (ref 0.5–2.2)
LYMPHOCYTES # BLD AUTO: 0.72 K/UL — LOW (ref 1.2–3.4)
LYMPHOCYTES # BLD AUTO: 7.8 % — LOW (ref 20.5–51.1)
MCHC RBC-ENTMCNC: 27.6 PG — SIGNIFICANT CHANGE UP (ref 27–31)
MCHC RBC-ENTMCNC: 31.9 G/DL — LOW (ref 32–37)
MCV RBC AUTO: 86.6 FL — SIGNIFICANT CHANGE UP (ref 80–94)
MONOCYTES # BLD AUTO: 0.61 K/UL — HIGH (ref 0.1–0.6)
MONOCYTES NFR BLD AUTO: 6.6 % — SIGNIFICANT CHANGE UP (ref 1.7–9.3)
NEUTROPHILS # BLD AUTO: 7.78 K/UL — HIGH (ref 1.4–6.5)
NEUTROPHILS NFR BLD AUTO: 84.6 % — HIGH (ref 42.2–75.2)
NRBC # BLD: 0 /100 WBCS — SIGNIFICANT CHANGE UP (ref 0–0)
PLATELET # BLD AUTO: 297 K/UL — SIGNIFICANT CHANGE UP (ref 130–400)
POTASSIUM SERPL-MCNC: 4.5 MMOL/L — SIGNIFICANT CHANGE UP (ref 3.5–5)
POTASSIUM SERPL-SCNC: 4.5 MMOL/L — SIGNIFICANT CHANGE UP (ref 3.5–5)
PROT SERPL-MCNC: 6.3 G/DL — SIGNIFICANT CHANGE UP (ref 6–8)
PROTHROM AB SERPL-ACNC: 15.8 SEC — HIGH (ref 9.95–12.87)
RBC # BLD: 4.02 M/UL — LOW (ref 4.7–6.1)
RBC # FLD: 14.7 % — HIGH (ref 11.5–14.5)
SODIUM SERPL-SCNC: 137 MMOL/L — SIGNIFICANT CHANGE UP (ref 135–146)
WBC # BLD: 9.21 K/UL — SIGNIFICANT CHANGE UP (ref 4.8–10.8)
WBC # FLD AUTO: 9.21 K/UL — SIGNIFICANT CHANGE UP (ref 4.8–10.8)

## 2018-10-08 PROCEDURE — 93970 EXTREMITY STUDY: CPT | Mod: 26

## 2018-10-08 RX ORDER — SODIUM CHLORIDE 9 MG/ML
3100 INJECTION INTRAMUSCULAR; INTRAVENOUS; SUBCUTANEOUS ONCE
Qty: 0 | Refills: 0 | Status: COMPLETED | OUTPATIENT
Start: 2018-10-08 | End: 2018-10-08

## 2018-10-08 RX ORDER — CEFEPIME 1 G/1
2000 INJECTION, POWDER, FOR SOLUTION INTRAMUSCULAR; INTRAVENOUS ONCE
Qty: 0 | Refills: 0 | Status: COMPLETED | OUTPATIENT
Start: 2018-10-08 | End: 2018-10-08

## 2018-10-08 RX ORDER — ACETAMINOPHEN 500 MG
975 TABLET ORAL ONCE
Qty: 0 | Refills: 0 | Status: COMPLETED | OUTPATIENT
Start: 2018-10-08 | End: 2018-10-08

## 2018-10-08 RX ORDER — CIPROFLOXACIN LACTATE 400MG/40ML
400 VIAL (ML) INTRAVENOUS ONCE
Qty: 0 | Refills: 0 | Status: COMPLETED | OUTPATIENT
Start: 2018-10-08 | End: 2018-10-08

## 2018-10-08 RX ADMIN — Medication 975 MILLIGRAM(S): at 20:24

## 2018-10-08 RX ADMIN — CEFEPIME 100 MILLIGRAM(S): 1 INJECTION, POWDER, FOR SOLUTION INTRAMUSCULAR; INTRAVENOUS at 22:15

## 2018-10-08 RX ADMIN — SODIUM CHLORIDE 3100 MILLILITER(S): 9 INJECTION INTRAMUSCULAR; INTRAVENOUS; SUBCUTANEOUS at 22:15

## 2018-10-08 RX ADMIN — Medication 200 MILLIGRAM(S): at 20:20

## 2018-10-08 NOTE — ED PROVIDER NOTE - PROGRESS NOTE DETAILS
sepsis activated at this ntime CT negative -  ultrasound with thick urinary bladder -   CT  no other source of infection - pt recently on abx for uti - pt baseline mental status per daughter,  no headache neck pin neck supple - risk outweighs benefit of LP at this time .

## 2018-10-08 NOTE — ED PROVIDER NOTE - MEDICAL DECISION MAKING DETAILS
85yM pmhx  dementia, DM, htn chol, cabg, hx of afib - no anticoagulation, no antiarrythmic , urinary incontinence  on oxybutynin,  wears adult diapers -  recent hospitalization 9/24/18-/9/28 -  for  urosepsis, seen by ID  -  abx  changed to 10 days of bactrim -  home for 1 week  p/w  generalized weakness x 3 days -  pt  baseline uses walker -  too weak to walk today  didn't eat or drink,  episodes of confusion.  daughter mentions cough since in  hospital no phlegm.  Pt denies  headache neck pain  chest pain  abdominal pain   , diarrhea.  PE: alert nontoxic   neck supple  mild pallor mm dry abd sfot nonteder   no cva tenderness resp cta  LE edema.  right > left -  skin no ulcers  plan sepsis protocol 85yM pmhx  dementia, DM, htn chol, cabg, hx of afib - no anticoagulation, no antiarrythmic , urinary incontinence  on oxybutynin,  wears adult diapers -  recent hospitalization 9/24/18-/9/28 -  for  urosepsis, seen by ID  -  abx  changed to 10 days of bactrim -  home for 1 week  p/w  generalized weakness x 3 days -  pt  baseline uses walker -  too weak to walk today  didn't eat or drink,  No  confusion.   daughter mentions cough since in  hospital no phlegm.  Pt denies  headache neck pain  chest pain  abdominal pain   , diarrhea.  PE: alert nontoxic   neck supple  mild pallor mm dry abd sfot nonteder   no cva tenderness resp cta  LE edema.  right > left -  skin no ulcers  plan sepsis protocol

## 2018-10-08 NOTE — ED PROVIDER NOTE - NS ED ROS FT
Review of Systems:  	•	CONSTITUTIONAL - no fever, no diaphoresis, no chills  	•	SKIN - no rash  	•	HEMATOLOGIC - no bleeding, no bruising  	•	EYES - no eye pain, no blurry vision  	•	ENT - no change in hearing, no sore throat, no ear pain or tinnitus  	•	RESPIRATORY - no shortness of breath, no cough  	•	CARDIAC - no chest pain, no palpitations  	•	GI - no abd pain, no nausea, no vomiting, no diarrhea, no constipation  	•	GENITO-URINARY - no discharge, no dysuria; no hematuria, no increased urinary frequency  	•	MUSCULOSKELETAL - no joint paint, no swelling, no redness  	•	NEUROLOGIC - postive weakness, no headache, no paresthesias, no LOC  	•	PSYCH - no anxiety, non suicidal, non homicidal, no hallucination, no depression

## 2018-10-08 NOTE — ED PROVIDER NOTE - PHYSICAL EXAMINATION
--EXAM--  VITAL SIGNS: I have reviewed vs documented at present.  CONSTITUTIONAL: in no acute distress.   SKIN: Warm and dry, no acute rash.   HEAD: Normocephalic; atraumatic.  EYES: PERRL, EOM intact; conjunctiva and sclera clear. No nystagmus.  ENT: No nasal discharge; airway clear.  NECK: Supple; non tender.  CARD: S1, S2, Regular rate and rhythm.   RESP: No wheezes, rales or rhonchi.  ABD: Normal bowel sounds; soft; non-distended; non-tender.  EXT: Normal ROM.   NEURO: Alert, oriented, grossly unremarkable generalixed weakness

## 2018-10-09 DIAGNOSIS — E11.9 TYPE 2 DIABETES MELLITUS WITHOUT COMPLICATIONS: ICD-10-CM

## 2018-10-09 DIAGNOSIS — R50.9 FEVER, UNSPECIFIED: ICD-10-CM

## 2018-10-09 DIAGNOSIS — F03.90 UNSPECIFIED DEMENTIA WITHOUT BEHAVIORAL DISTURBANCE: ICD-10-CM

## 2018-10-09 DIAGNOSIS — I10 ESSENTIAL (PRIMARY) HYPERTENSION: ICD-10-CM

## 2018-10-09 DIAGNOSIS — Z84.89 FAMILY HISTORY OF OTHER SPECIFIED CONDITIONS: Chronic | ICD-10-CM

## 2018-10-09 PROBLEM — I48.91 UNSPECIFIED ATRIAL FIBRILLATION: Chronic | Status: INACTIVE | Noted: 2018-04-18 | Resolved: 2018-10-09

## 2018-10-09 LAB
ALBUMIN SERPL ELPH-MCNC: 3.5 G/DL — SIGNIFICANT CHANGE UP (ref 3.5–5.2)
ALP SERPL-CCNC: 71 U/L — SIGNIFICANT CHANGE UP (ref 30–115)
ALT FLD-CCNC: 10 U/L — SIGNIFICANT CHANGE UP (ref 0–41)
ANION GAP SERPL CALC-SCNC: 15 MMOL/L — HIGH (ref 7–14)
APPEARANCE UR: CLEAR — SIGNIFICANT CHANGE UP
APTT BLD: 28.3 SEC — SIGNIFICANT CHANGE UP (ref 27–39.2)
AST SERPL-CCNC: 12 U/L — SIGNIFICANT CHANGE UP (ref 0–41)
BACTERIA # UR AUTO: ABNORMAL
BILIRUB SERPL-MCNC: 0.6 MG/DL — SIGNIFICANT CHANGE UP (ref 0.2–1.2)
BILIRUB UR-MCNC: NEGATIVE — SIGNIFICANT CHANGE UP
BUN SERPL-MCNC: 16 MG/DL — SIGNIFICANT CHANGE UP (ref 10–20)
CALCIUM SERPL-MCNC: 8.9 MG/DL — SIGNIFICANT CHANGE UP (ref 8.5–10.1)
CHLORIDE SERPL-SCNC: 97 MMOL/L — LOW (ref 98–110)
CO2 SERPL-SCNC: 26 MMOL/L — SIGNIFICANT CHANGE UP (ref 17–32)
COLOR SPEC: YELLOW — SIGNIFICANT CHANGE UP
COMMENT - URINE: SIGNIFICANT CHANGE UP
CREAT SERPL-MCNC: 0.9 MG/DL — SIGNIFICANT CHANGE UP (ref 0.7–1.5)
DIFF PNL FLD: ABNORMAL
EPI CELLS # UR: ABNORMAL /HPF
GLUCOSE BLDC GLUCOMTR-MCNC: 153 MG/DL — HIGH (ref 70–99)
GLUCOSE BLDC GLUCOMTR-MCNC: 207 MG/DL — HIGH (ref 70–99)
GLUCOSE BLDC GLUCOMTR-MCNC: 218 MG/DL — HIGH (ref 70–99)
GLUCOSE BLDC GLUCOMTR-MCNC: 231 MG/DL — HIGH (ref 70–99)
GLUCOSE SERPL-MCNC: 188 MG/DL — HIGH (ref 70–99)
GLUCOSE UR QL: NEGATIVE MG/DL — SIGNIFICANT CHANGE UP
GRAN CASTS # UR COMP ASSIST: SIGNIFICANT CHANGE UP /LPF
HCT VFR BLD CALC: 39.4 % — LOW (ref 42–52)
HGB BLD-MCNC: 12.1 G/DL — LOW (ref 14–18)
INR BLD: 1.51 RATIO — HIGH (ref 0.65–1.3)
KETONES UR-MCNC: 15
LEUKOCYTE ESTERASE UR-ACNC: NEGATIVE — SIGNIFICANT CHANGE UP
MAGNESIUM SERPL-MCNC: 2.2 MG/DL — SIGNIFICANT CHANGE UP (ref 1.8–2.4)
MCHC RBC-ENTMCNC: 27.4 PG — SIGNIFICANT CHANGE UP (ref 27–31)
MCHC RBC-ENTMCNC: 30.7 G/DL — LOW (ref 32–37)
MCV RBC AUTO: 89.3 FL — SIGNIFICANT CHANGE UP (ref 80–94)
NITRITE UR-MCNC: NEGATIVE — SIGNIFICANT CHANGE UP
NRBC # BLD: 0 /100 WBCS — SIGNIFICANT CHANGE UP (ref 0–0)
PH UR: 5.5 — SIGNIFICANT CHANGE UP (ref 5–8)
PLATELET # BLD AUTO: 293 K/UL — SIGNIFICANT CHANGE UP (ref 130–400)
POTASSIUM SERPL-MCNC: 4.8 MMOL/L — SIGNIFICANT CHANGE UP (ref 3.5–5)
POTASSIUM SERPL-SCNC: 4.8 MMOL/L — SIGNIFICANT CHANGE UP (ref 3.5–5)
PROT SERPL-MCNC: 6.2 G/DL — SIGNIFICANT CHANGE UP (ref 6–8)
PROT UR-MCNC: NEGATIVE MG/DL — SIGNIFICANT CHANGE UP
PROTHROM AB SERPL-ACNC: 16.5 SEC — HIGH (ref 9.95–12.87)
RBC # BLD: 4.41 M/UL — LOW (ref 4.7–6.1)
RBC # FLD: 14.9 % — HIGH (ref 11.5–14.5)
RBC CASTS # UR COMP ASSIST: SIGNIFICANT CHANGE UP /HPF
SODIUM SERPL-SCNC: 138 MMOL/L — SIGNIFICANT CHANGE UP (ref 135–146)
SP GR SPEC: 1.02 — SIGNIFICANT CHANGE UP (ref 1.01–1.03)
UROBILINOGEN FLD QL: 0.2 MG/DL — SIGNIFICANT CHANGE UP (ref 0.2–0.2)
WBC # BLD: 6.58 K/UL — SIGNIFICANT CHANGE UP (ref 4.8–10.8)
WBC # FLD AUTO: 6.58 K/UL — SIGNIFICANT CHANGE UP (ref 4.8–10.8)
WBC UR QL: SIGNIFICANT CHANGE UP /HPF

## 2018-10-09 RX ORDER — CEFEPIME 1 G/1
1000 INJECTION, POWDER, FOR SOLUTION INTRAMUSCULAR; INTRAVENOUS EVERY 8 HOURS
Qty: 0 | Refills: 0 | Status: DISCONTINUED | OUTPATIENT
Start: 2018-10-09 | End: 2018-10-11

## 2018-10-09 RX ORDER — CEFAZOLIN SODIUM 1 G
1000 VIAL (EA) INJECTION EVERY 8 HOURS
Qty: 0 | Refills: 0 | Status: DISCONTINUED | OUTPATIENT
Start: 2018-10-09 | End: 2018-10-09

## 2018-10-09 RX ORDER — CEFEPIME 1 G/1
1000 INJECTION, POWDER, FOR SOLUTION INTRAMUSCULAR; INTRAVENOUS ONCE
Qty: 0 | Refills: 0 | Status: COMPLETED | OUTPATIENT
Start: 2018-10-09 | End: 2018-10-09

## 2018-10-09 RX ORDER — PANTOPRAZOLE SODIUM 20 MG/1
40 TABLET, DELAYED RELEASE ORAL
Qty: 0 | Refills: 0 | Status: DISCONTINUED | OUTPATIENT
Start: 2018-10-09 | End: 2018-10-12

## 2018-10-09 RX ORDER — HEPARIN SODIUM 5000 [USP'U]/ML
5000 INJECTION INTRAVENOUS; SUBCUTANEOUS EVERY 8 HOURS
Qty: 0 | Refills: 0 | Status: DISCONTINUED | OUTPATIENT
Start: 2018-10-09 | End: 2018-10-12

## 2018-10-09 RX ORDER — MEMANTINE HYDROCHLORIDE 10 MG/1
10 TABLET ORAL
Qty: 0 | Refills: 0 | Status: DISCONTINUED | OUTPATIENT
Start: 2018-10-09 | End: 2018-10-12

## 2018-10-09 RX ORDER — INSULIN LISPRO 100/ML
VIAL (ML) SUBCUTANEOUS
Qty: 0 | Refills: 0 | Status: DISCONTINUED | OUTPATIENT
Start: 2018-10-09 | End: 2018-10-12

## 2018-10-09 RX ORDER — DONEPEZIL HYDROCHLORIDE 10 MG/1
10 TABLET, FILM COATED ORAL AT BEDTIME
Qty: 0 | Refills: 0 | Status: DISCONTINUED | OUTPATIENT
Start: 2018-10-09 | End: 2018-10-09

## 2018-10-09 RX ORDER — POLYETHYLENE GLYCOL 3350 17 G/17G
17 POWDER, FOR SOLUTION ORAL DAILY
Qty: 0 | Refills: 0 | Status: DISCONTINUED | OUTPATIENT
Start: 2018-10-09 | End: 2018-10-12

## 2018-10-09 RX ORDER — PANTOPRAZOLE SODIUM 20 MG/1
40 TABLET, DELAYED RELEASE ORAL
Qty: 0 | Refills: 0 | Status: DISCONTINUED | OUTPATIENT
Start: 2018-10-09 | End: 2018-10-09

## 2018-10-09 RX ORDER — CEFEPIME 1 G/1
INJECTION, POWDER, FOR SOLUTION INTRAMUSCULAR; INTRAVENOUS
Qty: 0 | Refills: 0 | Status: DISCONTINUED | OUTPATIENT
Start: 2018-10-09 | End: 2018-10-11

## 2018-10-09 RX ORDER — SODIUM CHLORIDE 9 MG/ML
1000 INJECTION INTRAMUSCULAR; INTRAVENOUS; SUBCUTANEOUS
Qty: 0 | Refills: 0 | Status: DISCONTINUED | OUTPATIENT
Start: 2018-10-09 | End: 2018-10-09

## 2018-10-09 RX ORDER — OMEPRAZOLE 10 MG/1
1 CAPSULE, DELAYED RELEASE ORAL
Qty: 0 | Refills: 0 | COMMUNITY

## 2018-10-09 RX ORDER — ASPIRIN/CALCIUM CARB/MAGNESIUM 324 MG
325 TABLET ORAL DAILY
Qty: 0 | Refills: 0 | Status: DISCONTINUED | OUTPATIENT
Start: 2018-10-09 | End: 2018-10-12

## 2018-10-09 RX ORDER — OXYBUTYNIN CHLORIDE 5 MG
10 TABLET ORAL DAILY
Qty: 0 | Refills: 0 | Status: DISCONTINUED | OUTPATIENT
Start: 2018-10-09 | End: 2018-10-09

## 2018-10-09 RX ORDER — DOCUSATE SODIUM 100 MG
100 CAPSULE ORAL
Qty: 0 | Refills: 0 | Status: DISCONTINUED | OUTPATIENT
Start: 2018-10-09 | End: 2018-10-12

## 2018-10-09 RX ORDER — PIOGLITAZONE HYDROCHLORIDE 15 MG/1
45 TABLET ORAL DAILY
Qty: 0 | Refills: 0 | Status: DISCONTINUED | OUTPATIENT
Start: 2018-10-09 | End: 2018-10-09

## 2018-10-09 RX ADMIN — HEPARIN SODIUM 5000 UNIT(S): 5000 INJECTION INTRAVENOUS; SUBCUTANEOUS at 21:23

## 2018-10-09 RX ADMIN — MEMANTINE HYDROCHLORIDE 10 MILLIGRAM(S): 10 TABLET ORAL at 18:46

## 2018-10-09 RX ADMIN — CEFEPIME 100 MILLIGRAM(S): 1 INJECTION, POWDER, FOR SOLUTION INTRAMUSCULAR; INTRAVENOUS at 14:20

## 2018-10-09 RX ADMIN — HEPARIN SODIUM 5000 UNIT(S): 5000 INJECTION INTRAVENOUS; SUBCUTANEOUS at 15:15

## 2018-10-09 RX ADMIN — Medication 2: at 16:55

## 2018-10-09 RX ADMIN — PANTOPRAZOLE SODIUM 40 MILLIGRAM(S): 20 TABLET, DELAYED RELEASE ORAL at 10:20

## 2018-10-09 RX ADMIN — Medication 325 MILLIGRAM(S): at 14:20

## 2018-10-09 RX ADMIN — CEFEPIME 100 MILLIGRAM(S): 1 INJECTION, POWDER, FOR SOLUTION INTRAMUSCULAR; INTRAVENOUS at 21:24

## 2018-10-09 RX ADMIN — Medication 100 MILLIGRAM(S): at 18:46

## 2018-10-09 NOTE — ED ADULT NURSE NOTE - NSIMPLEMENTINTERV_GEN_ALL_ED
Implemented All Universal Safety Interventions:  [place] to call system. Call bell, personal items and telephone within reach. Instruct patient to call for assistance. Room bathroom lighting operational. Non-slip footwear when patient is off stretcher. Physically safe environment: no spills, clutter or unnecessary equipment. Stretcher in lowest position, wheels locked, appropriate side rails in place.

## 2018-10-09 NOTE — PATIENT PROFILE ADULT - NSPROHMDIABETMGMTSTRAT_GEN_A_NUR
blood glucose testing/coping strategies/diet modification/activity/exercise/adequate rest/medication therapy

## 2018-10-09 NOTE — CHART NOTE - NSCHARTNOTEFT_GEN_A_CORE
addend to H&P    chart reviewed, patient seen and examined  recent dc note of 9/28 reviewed addend to H&P    chart reviewed, patient seen and examined    recent dc note of 9/28 reviewed: was here for sepsis due to UTI with septic encephalopathy. was also found with stool impaction at the time. was discharged with po bactrim to complete 10 days for MSSA on cultures  returned for weakness and fever 101 degrees. admitted and treated empirically for suspected recurrent sepsis  no clear source at this time as UA negative as is CT of the abdomen and pelvis for acute infectious processes  LE duplex with no DVT  CXR limited by low lung volumes  he is currently afebrile      please refer to prior notes for full plan. will add      # Fever: with no evidence for sepsis on admission  - change abx to cefepime empirically  - Bcx and UCx confirmed in the lab  - repeat CXR  - ID eval with close attn to vitals  - hold Ditropan for now      # PT / PMR eval for weakness: Patient uses a chair-lift and a walker at home. Patient will need a Pt eval     # dm: hold orals and keep sliding scale coverage    # BL LE edema: duplex with no DVT  ---- hold IVF as long as tolerating po      # daughter concern for afib: this is known histionically and was in afib in ER as well. he is on ASA. rate is controlled. unable to pull up prior EKGs in EMR. not complaining of cardiac symptoms  - EKG does show prolonged QTc and as such would optimize electrolytes. labs ordered for today  - would also hold donepezil for same reason addend to H&P    chart reviewed, patient seen and examined    recent dc note of 9/28 reviewed: was here for sepsis due to UTI with septic encephalopathy. was also found with stool impaction at the time. was discharged with po bactrim to complete 10 days for MSSA on cultures  returned for weakness and fever 101 degrees. admitted and treated empirically for suspected recurrent sepsis  no clear source at this time as UA negative as is CT of the abdomen and pelvis for acute infectious processes  LE duplex with no DVT  CXR limited by low lung volumes  he is currently afebrile    today he is more awake, back to his usual mental state, and eating  no walking    please refer to prior notes for full plan. will add      # Fever: with no evidence for sepsis on admission. improved clinically  - change abx to cefepime empirically  - Bcx and UCx confirmed in the lab  - repeat CXR  - ID eval with close attn to vitals  - hold Ditropan for now      # PT / PMR eval for weakness: Patient uses a chair-lift and a walker at home. Patient will need a Pt eval     # dm: hold orals and keep sliding scale coverage    # BL LE edema: duplex with no DVT. acutwe on chronic. ? related to lack mobility vs other. duplex was negative for a DVT  - hold IVF as long as tolerating po  - elevate the legs  - check TTE      # daughter concern for afib: this is known histionically and was in afib in ER as well. he is on ASA. rate is controlled. unable to pull up prior EKGs in EMR. not complaining of cardiac symptoms  - EKG does show prolonged QTc and as such would optimize electrolytes. labs ordered for today  - would also hold donepezil for same reason      # discussed with RN, to feasible extent with patient, and his daughter Natalie

## 2018-10-09 NOTE — PHYSICAL THERAPY INITIAL EVALUATION ADULT - GENERAL OBSERVATIONS, REHAB EVAL
13:20 - 13:42. Chart reviewed. Patient available to be seen for physical therapy. Patient encountered semi-reclined in bed, daughter at bedside. Patient would like to try to walk with PT now.

## 2018-10-09 NOTE — PHYSICAL THERAPY INITIAL EVALUATION ADULT - IMPAIRMENTS FOUND, PT EVAL
aerobic capacity/endurance/ergonomics and body mechanics/ROM/muscle strength/posture/gait, locomotion, and balance

## 2018-10-09 NOTE — PHYSICAL THERAPY INITIAL EVALUATION ADULT - GAIT DEVIATIONS NOTED, PT EVAL
bilateral knees flexed in stance/decreased step length/increased time in double stance/decreased osorio/decreased weight-shifting ability

## 2018-10-09 NOTE — H&P ADULT - NSHPLABSRESULTS_GEN_ALL_CORE
11.1   9.21  )-----------( 297      ( 08 Oct 2018 19:50 )             34.8     10-08    137  |  95<L>  |  14  ----------------------------<  134<H>  4.5   |  23  |  1.1    Ca    8.8      08 Oct 2018 19:50    TPro  6.3  /  Alb  3.7  /  TBili  1.0  /  DBili  x   /  AST  16  /  ALT  11  /  AlkPhos  68  10-08          Urinalysis Basic - ( 09 Oct 2018 00:15 )    Color: Yellow / Appearance: Clear / S.020 / pH: x  Gluc: x / Ketone: 15  / Bili: Negative / Urobili: 0.2 mg/dL   Blood: x / Protein: Negative mg/dL / Nitrite: Negative   Leuk Esterase: Negative / RBC: 1-2 /HPF / WBC 1-2 /HPF   Sq Epi: x / Non Sq Epi: Occasional /HPF / Bacteria: Few      PT/INR - ( 08 Oct 2018 19:50 )   PT: 15.80 sec;   INR: 1.45 ratio         PTT - ( 08 Oct 2018 19:50 )  PTT:33.5 sec  Lactate Trend  10-08 @ 19:50 Lactate:1.6         CAPILLARY BLOOD GLUCOSE      POCT Blood Glucose.: 131 mg/dL (08 Oct 2018 18:23)    Culture Results:   >100,000 CFU/ml Staphylococcus aureus ( @ 10:29)  Culture Results:   No growth at 5 days. ( @ 10:10)

## 2018-10-09 NOTE — H&P ADULT - ASSESSMENT
Patient is a 85y old  Male who presents with a chief complaint of  weakness                                                                                                                                                                                                                                                                                    HEALTH ISSUES - PROBLEM Dx:Uti/sepsis/dementia/dm

## 2018-10-09 NOTE — CONSULT NOTE ADULT - SUBJECTIVE AND OBJECTIVE BOX
HPI: 86 y/o  m  ptn admitted to [facility]  s  hx  of weakness  unable  to  walk  no  hx  of   fever, v/d/  cough sob   cp  ---n/a      PTN  REFERRED TO ACUTE  REHAB  FOR  EVAL AND  TX   PAST MEDICAL & SURGICAL HISTORY:  Dementia  High cholesterol  Hypertension  Diabetes  FH: CABG (coronary artery bypass surgery)      Hospital Course:    TODAY'S SUBJECTIVE & REVIEW OF SYMPTOMS:     Constitutional WNL   Cardio WNL   Resp WNL   GI WNL  Heme WNL  Endo WNL  Skin WNL  MSK WNL  Neuro WNL  Cognitive WNL  Psych WNL      MEDICATIONS  (STANDING):  aspirin 325 milliGRAM(s) Oral daily  cefepime   IVPB      cefepime   IVPB 1000 milliGRAM(s) IV Intermittent every 8 hours  docusate sodium Oral Tab/Cap - Peds 100 milliGRAM(s) Oral two times a day  heparin SubCutaneous Injection - Peds 5000 Unit(s) SubCutaneous every 8 hours  insulin lispro (HumaLOG) corrective regimen sliding scale   SubCutaneous three times a day before meals  memantine 10 milliGRAM(s) Oral two times a day  pantoprazole    Tablet 40 milliGRAM(s) Oral before breakfast    MEDICATIONS  (PRN):  polyethylene glycol 3350 17 Gram(s) Oral daily PRN Constipation      FAMILY HISTORY:  No pertinent family history in first degree relatives      Allergies    Avandia (Unknown)  Lipitor (Unknown)  metformin (Unknown)    Intolerances        SOCIAL HISTORY:    [  ] Etoh  [  ] Smoking  [  ] Substance abuse     Home Environment:  [  ] Home Alone  [xx  ] Lives with Family dtr   [  ] Home Health Aid     Dwelling:  [  ] Apartment  [  x] Private House  [  ] Adult Home  [  ] Skilled Nursing Facility      [  ] Short Term  [  ] Long Term  [  x] Stairs       Elevator [  ]    FUNCTIONAL STATUS PTA: (Check all that apply)  Ambulation: [ x  ]Independent    [  ] Dependent     [  ] Non-Ambulatory  Assistive Device: [x  ] SA Cane  [  ]  Q Cane  [  x] Walker  [  ]  Wheelchair  ADL : [ x] Independent  [  ]  Dependent       Vital Signs Last 24 Hrs  T(C): 37.3 (09 Oct 2018 14:00), Max: 37.3 (09 Oct 2018 14:00)  T(F): 99.2 (09 Oct 2018 14:00), Max: 99.2 (09 Oct 2018 14:00)  HR: 87 (09 Oct 2018 14:00) (67 - 100)  BP: 141/76 (09 Oct 2018 14:00) (120/72 - 172/79)  BP(mean): --  RR: 18 (09 Oct 2018 14:00) (18 - 18)  SpO2: 97% (09 Oct 2018 05:58) (96% - 97%)      PHYSICAL EXAM: Alert & Oriented X 2  GENERAL: NAD, well-groomed, well-developed  HEAD:  Atraumatic, Normocephalic  EYES: EOMI, PERRLA, conjunctiva and sclera clear  NECK: Supple, No JVD, Normal thyroid  CHEST/LUNG: Clear to percussion bilaterally; No rales, rhonchi, wheezing, or rubs  HEART: Regular rate and rhythm; No murmurs, rubs, or gallops  ABDOMEN: Soft, Nontender, Nondistended; Bowel sounds present  EXTREMITIES:  2+ Peripheral Pulses, No clubbing, cyanosis, or edema    NERVOUS SYSTEM:  Cranial Nerves 2-12 intact [ x ] Abnormal  [  ]  ROM: WFL all extremities [  ]  Abnormal [x ]  Motor Strength: WFL all extremities  [  ]  Abnormal [x  ]  Sensation: intact to light touch [  ] Abnormal [ x ]  Reflexes: Symmetric [  ]  Abnormal [ x ]    FUNCTIONAL STATUS:  Bed Mobility: Independent [  ]  Supervision [  ]  Needs Assistance [x  ]  N/A [  ]  Transfers: Independent [  ]  Supervision [  ]  Needs Assistance [ x]  N/A [  ]   Ambulation: Independent [  ]  Supervision [  ]  Needs Assistance [ x]  N/A [  ]  ADL: Independent [ x ] Requires Assistance [  ] N/A [  ]    SEE PT IE NOTES  LABS:                        12.1   6.58  )-----------( 293      ( 09 Oct 2018 12:51 )             39.4     10-09    138  |  97<L>  |  16  ----------------------------<  188<H>  4.8   |  26  |  0.9    Ca    8.9      09 Oct 2018 12:51  Mg     2.2     10-09    TPro  6.2  /  Alb  3.5  /  TBili  0.6  /  DBili  x   /  AST  12  /  ALT  10  /  AlkPhos  71  10-09    PT/INR - ( 09 Oct 2018 15:57 )   PT: 16.50 sec;   INR: 1.51 ratio         PTT - ( 09 Oct 2018 15:57 )  PTT:28.3 sec  Urinalysis Basic - ( 09 Oct 2018 00:15 )    Color: Yellow / Appearance: Clear / S.020 / pH: x  Gluc: x / Ketone: 15  / Bili: Negative / Urobili: 0.2 mg/dL   Blood: x / Protein: Negative mg/dL / Nitrite: Negative   Leuk Esterase: Negative / RBC: 1-2 /HPF / WBC 1-2 /HPF   Sq Epi: x / Non Sq Epi: Occasional /HPF / Bacteria: Few        RADIOLOGY & ADDITIONAL STUDIES:    Assesment:

## 2018-10-10 LAB
ALBUMIN SERPL ELPH-MCNC: 3.1 G/DL — LOW (ref 3.5–5.2)
ALP SERPL-CCNC: 63 U/L — SIGNIFICANT CHANGE UP (ref 30–115)
ALT FLD-CCNC: 9 U/L — SIGNIFICANT CHANGE UP (ref 0–41)
ANION GAP SERPL CALC-SCNC: 12 MMOL/L — SIGNIFICANT CHANGE UP (ref 7–14)
APTT BLD: 28.3 SEC — SIGNIFICANT CHANGE UP (ref 27–39.2)
AST SERPL-CCNC: 11 U/L — SIGNIFICANT CHANGE UP (ref 0–41)
BILIRUB SERPL-MCNC: 0.4 MG/DL — SIGNIFICANT CHANGE UP (ref 0.2–1.2)
BUN SERPL-MCNC: 17 MG/DL — SIGNIFICANT CHANGE UP (ref 10–20)
CALCIUM SERPL-MCNC: 8.4 MG/DL — LOW (ref 8.5–10.1)
CHLORIDE SERPL-SCNC: 100 MMOL/L — SIGNIFICANT CHANGE UP (ref 98–110)
CK MB CFR SERPL CALC: 3.3 NG/ML — SIGNIFICANT CHANGE UP (ref 0.6–6.3)
CO2 SERPL-SCNC: 27 MMOL/L — SIGNIFICANT CHANGE UP (ref 17–32)
CREAT SERPL-MCNC: 1 MG/DL — SIGNIFICANT CHANGE UP (ref 0.7–1.5)
CULTURE RESULTS: NO GROWTH — SIGNIFICANT CHANGE UP
GLUCOSE BLDC GLUCOMTR-MCNC: 183 MG/DL — HIGH (ref 70–99)
GLUCOSE BLDC GLUCOMTR-MCNC: 214 MG/DL — HIGH (ref 70–99)
GLUCOSE BLDC GLUCOMTR-MCNC: 222 MG/DL — HIGH (ref 70–99)
GLUCOSE BLDC GLUCOMTR-MCNC: 300 MG/DL — HIGH (ref 70–99)
GLUCOSE SERPL-MCNC: 162 MG/DL — HIGH (ref 70–99)
HCT VFR BLD CALC: 33.8 % — LOW (ref 42–52)
HGB BLD-MCNC: 10.9 G/DL — LOW (ref 14–18)
INR BLD: 1.42 RATIO — HIGH (ref 0.65–1.3)
MAGNESIUM SERPL-MCNC: 2.1 MG/DL — SIGNIFICANT CHANGE UP (ref 1.8–2.4)
MCHC RBC-ENTMCNC: 27.9 PG — SIGNIFICANT CHANGE UP (ref 27–31)
MCHC RBC-ENTMCNC: 32.2 G/DL — SIGNIFICANT CHANGE UP (ref 32–37)
MCV RBC AUTO: 86.7 FL — SIGNIFICANT CHANGE UP (ref 80–94)
NRBC # BLD: 0 /100 WBCS — SIGNIFICANT CHANGE UP (ref 0–0)
PLATELET # BLD AUTO: 314 K/UL — SIGNIFICANT CHANGE UP (ref 130–400)
POTASSIUM SERPL-MCNC: 4.5 MMOL/L — SIGNIFICANT CHANGE UP (ref 3.5–5)
POTASSIUM SERPL-SCNC: 4.5 MMOL/L — SIGNIFICANT CHANGE UP (ref 3.5–5)
PROT SERPL-MCNC: 5.6 G/DL — LOW (ref 6–8)
PROTHROM AB SERPL-ACNC: 15.4 SEC — HIGH (ref 9.95–12.87)
RBC # BLD: 3.9 M/UL — LOW (ref 4.7–6.1)
RBC # FLD: 14.6 % — HIGH (ref 11.5–14.5)
SODIUM SERPL-SCNC: 139 MMOL/L — SIGNIFICANT CHANGE UP (ref 135–146)
SPECIMEN SOURCE: SIGNIFICANT CHANGE UP
TROPONIN T SERPL-MCNC: 0.02 NG/ML — HIGH
WBC # BLD: 5.72 K/UL — SIGNIFICANT CHANGE UP (ref 4.8–10.8)
WBC # FLD AUTO: 5.72 K/UL — SIGNIFICANT CHANGE UP (ref 4.8–10.8)

## 2018-10-10 RX ORDER — SODIUM CHLORIDE 9 MG/ML
1000 INJECTION, SOLUTION INTRAVENOUS
Qty: 0 | Refills: 0 | Status: DISCONTINUED | OUTPATIENT
Start: 2018-10-10 | End: 2018-10-12

## 2018-10-10 RX ORDER — DEXTROSE 50 % IN WATER 50 %
25 SYRINGE (ML) INTRAVENOUS ONCE
Qty: 0 | Refills: 0 | Status: DISCONTINUED | OUTPATIENT
Start: 2018-10-10 | End: 2018-10-12

## 2018-10-10 RX ORDER — GLUCAGON INJECTION, SOLUTION 0.5 MG/.1ML
1 INJECTION, SOLUTION SUBCUTANEOUS ONCE
Qty: 0 | Refills: 0 | Status: DISCONTINUED | OUTPATIENT
Start: 2018-10-10 | End: 2018-10-12

## 2018-10-10 RX ORDER — DEXTROSE 50 % IN WATER 50 %
12.5 SYRINGE (ML) INTRAVENOUS ONCE
Qty: 0 | Refills: 0 | Status: DISCONTINUED | OUTPATIENT
Start: 2018-10-10 | End: 2018-10-12

## 2018-10-10 RX ORDER — INSULIN GLARGINE 100 [IU]/ML
5 INJECTION, SOLUTION SUBCUTANEOUS AT BEDTIME
Qty: 0 | Refills: 0 | Status: DISCONTINUED | OUTPATIENT
Start: 2018-10-10 | End: 2018-10-11

## 2018-10-10 RX ORDER — DEXTROSE 50 % IN WATER 50 %
15 SYRINGE (ML) INTRAVENOUS ONCE
Qty: 0 | Refills: 0 | Status: DISCONTINUED | OUTPATIENT
Start: 2018-10-10 | End: 2018-10-12

## 2018-10-10 RX ADMIN — Medication 1: at 07:46

## 2018-10-10 RX ADMIN — Medication 100 MILLIGRAM(S): at 17:43

## 2018-10-10 RX ADMIN — HEPARIN SODIUM 5000 UNIT(S): 5000 INJECTION INTRAVENOUS; SUBCUTANEOUS at 14:44

## 2018-10-10 RX ADMIN — Medication 2: at 17:45

## 2018-10-10 RX ADMIN — Medication 3: at 11:38

## 2018-10-10 RX ADMIN — HEPARIN SODIUM 5000 UNIT(S): 5000 INJECTION INTRAVENOUS; SUBCUTANEOUS at 21:51

## 2018-10-10 RX ADMIN — CEFEPIME 100 MILLIGRAM(S): 1 INJECTION, POWDER, FOR SOLUTION INTRAMUSCULAR; INTRAVENOUS at 14:43

## 2018-10-10 RX ADMIN — CEFEPIME 100 MILLIGRAM(S): 1 INJECTION, POWDER, FOR SOLUTION INTRAMUSCULAR; INTRAVENOUS at 05:48

## 2018-10-10 RX ADMIN — INSULIN GLARGINE 5 UNIT(S): 100 INJECTION, SOLUTION SUBCUTANEOUS at 21:52

## 2018-10-10 RX ADMIN — MEMANTINE HYDROCHLORIDE 10 MILLIGRAM(S): 10 TABLET ORAL at 05:49

## 2018-10-10 RX ADMIN — Medication 100 MILLIGRAM(S): at 05:49

## 2018-10-10 RX ADMIN — PANTOPRAZOLE SODIUM 40 MILLIGRAM(S): 20 TABLET, DELAYED RELEASE ORAL at 05:49

## 2018-10-10 RX ADMIN — Medication 325 MILLIGRAM(S): at 11:38

## 2018-10-10 RX ADMIN — MEMANTINE HYDROCHLORIDE 10 MILLIGRAM(S): 10 TABLET ORAL at 18:06

## 2018-10-10 RX ADMIN — HEPARIN SODIUM 5000 UNIT(S): 5000 INJECTION INTRAVENOUS; SUBCUTANEOUS at 05:54

## 2018-10-10 RX ADMIN — CEFEPIME 100 MILLIGRAM(S): 1 INJECTION, POWDER, FOR SOLUTION INTRAMUSCULAR; INTRAVENOUS at 21:50

## 2018-10-10 NOTE — CONSULT NOTE ADULT - SUBJECTIVE AND OBJECTIVE BOX
Patient is a 85y old  Male who presents with a chief complaint of generalised weakness-------Progressive > 24 hrs duration   [ pt is s/p treatment for uti        Vital Signs Last 24 Hrs  T(C): 37.7 (10 Oct 2018 14:27), Max: 37.7 (10 Oct 2018 14:27)  T(F): 99.8 (10 Oct 2018 14:27), Max: 99.8 (10 Oct 2018 14:27)  HR: 84 (10 Oct 2018 14:27) (55 - 84)  BP: 145/67 (10 Oct 2018 14:27) (145/67 - 154/67)  BP(mean): --  RR: 16 (10 Oct 2018 14:27) (16 - 16)  SpO2: --          LABS:                        10.9   5.72  )-----------( 314      ( 10 Oct 2018 07:02 )             33.8         10-10    139  |  100  |  17  ----------------------------<  162<H>  4.5   |  27  |  1.0    Ca    8.4<L>      10 Oct 2018 07:02  Mg     2.1     10-10    TPro  5.6<L>  /  Alb  3.1<L>  /  TBili  0.4  /  DBili  x   /  AST  11  /  ALT  9   /  AlkPhos  63  10-10          MEDICATIONS  (STANDING):  aspirin 325 milliGRAM(s) Oral daily  cefepime   IVPB      cefepime   IVPB 1000 milliGRAM(s) IV Intermittent every 8 hours  dextrose 5%. 1000 milliLiter(s) (50 mL/Hr) IV Continuous <Continuous>  dextrose 50% Injectable 12.5 Gram(s) IV Push once  dextrose 50% Injectable 25 Gram(s) IV Push once  dextrose 50% Injectable 25 Gram(s) IV Push once  docusate sodium Oral Tab/Cap - Peds 100 milliGRAM(s) Oral two times a day  heparin SubCutaneous Injection - Peds 5000 Unit(s) SubCutaneous every 8 hours  insulin glargine Injectable (LANTUS) 5 Unit(s) SubCutaneous at bedtime  insulin lispro (HumaLOG) corrective regimen sliding scale   SubCutaneous three times a day before meals  memantine 10 milliGRAM(s) Oral two times a day  pantoprazole    Tablet 40 milliGRAM(s) Oral before breakfast    MEDICATIONS  (PRN):  dextrose 40% Gel 15 Gram(s) Oral once PRN Blood Glucose LESS THAN 70 milliGRAM(s)/deciliter  glucagon  Injectable 1 milliGRAM(s) IntraMuscular once PRN Glucose LESS THAN 70 milligrams/deciliter  polyethylene glycol 3350 17 Gram(s) Oral daily PRN Constipation        MICROBIOLOGY DATA:    Urine Microscopic-Add On (NC) (10.09.18 @ 00:15)    Red Blood Cell - Urine: 1-2 /HPF    White Blood Cell - Urine: 1-2 /HPF    Bacteria: Few    Comment - Urine: Moderate mucus threads    Epithelial Cells: Occasional /HPF    Granular Cast: Occasional /LPF Patient is a 85y old  Male who presents with a chief complaint of generalised weakness-------Progressive > 24 hrs duration   [ pt is s/p treatment for uti        REVIEW OF SYSTEMS: Total of twelve systems have been reviewed with patient and found to be negative unless mentioned in HPI      PAST MEDICAL & SURGICAL HISTORY:  Dementia  High cholesterol  Hypertension  Diabetes  FH: CABG (coronary artery bypass surgery)        FAMILY HISTORY: Non contributory to the present illness      ALLERGIES: No ABx      Vital Signs Last 24 Hrs  T(C): 37.7 (10 Oct 2018 14:27), Max: 37.7 (10 Oct 2018 14:27)  T(F): 99.8 (10 Oct 2018 14:27), Max: 99.8 (10 Oct 2018 14:27)  HR: 84 (10 Oct 2018 14:27) (55 - 84)  BP: 145/67 (10 Oct 2018 14:27) (145/67 - 154/67)  BP(mean): --  RR: 16 (10 Oct 2018 14:27) (16 - 16)  SpO2: --         PHYSICAL EXAMS:  GENERAL: Not in distress  CVS: s1 and s2 present   RESP: Air entry B/L  GI: Abdomen  nontender and nondistended   EXT: NO pedal edema  CNS: Awake And  Alert        LABS:                        10.9   5.72  )-----------( 314      ( 10 Oct 2018 07:02 )             33.8         10-10    139  |  100  |  17  ----------------------------<  162<H>  4.5   |  27  |  1.0    Ca    8.4<L>      10 Oct 2018 07:02  Mg     2.1     10-10    TPro  5.6<L>  /  Alb  3.1<L>  /  TBili  0.4  /  DBili  x   /  AST  11  /  ALT  9   /  AlkPhos  63  10-10          MEDICATIONS  (STANDING):  aspirin 325 milliGRAM(s) Oral daily  cefepime   IVPB      cefepime   IVPB 1000 milliGRAM(s) IV Intermittent every 8 hours  dextrose 5%. 1000 milliLiter(s) (50 mL/Hr) IV Continuous <Continuous>  dextrose 50% Injectable 12.5 Gram(s) IV Push once  dextrose 50% Injectable 25 Gram(s) IV Push once  dextrose 50% Injectable 25 Gram(s) IV Push once  docusate sodium Oral Tab/Cap - Peds 100 milliGRAM(s) Oral two times a day  heparin SubCutaneous Injection - Peds 5000 Unit(s) SubCutaneous every 8 hours  insulin glargine Injectable (LANTUS) 5 Unit(s) SubCutaneous at bedtime  insulin lispro (HumaLOG) corrective regimen sliding scale   SubCutaneous three times a day before meals  memantine 10 milliGRAM(s) Oral two times a day  pantoprazole    Tablet 40 milliGRAM(s) Oral before breakfast    MEDICATIONS  (PRN):  dextrose 40% Gel 15 Gram(s) Oral once PRN Blood Glucose LESS THAN 70 milliGRAM(s)/deciliter  glucagon  Injectable 1 milliGRAM(s) IntraMuscular once PRN Glucose LESS THAN 70 milligrams/deciliter  polyethylene glycol 3350 17 Gram(s) Oral daily PRN Constipation          RADIOLOGY DATA:    < from: Xray Chest 1 View-PORTABLE IMMEDIATE (10.09.18 @ 13:27) >  Developing lower lobe opacities which could reflect pneumonia given the   history of fever. No significant effusions. Recommend continued follow-up.      < end of copied text >            MICROBIOLOGY DATA:    Urine Microscopic-Add On (NC) (10.09.18 @ 00:15)    Red Blood Cell - Urine: 1-2 /HPF    White Blood Cell - Urine: 1-2 /HPF    Bacteria: Few    Comment - Urine: Moderate mucus threads    Epithelial Cells: Occasional /HPF    Granular Cast: Occasional /LPF      Culture - Blood (10.08.18 @ 19:50)    Specimen Source: .Blood Blood-Peripheral    Culture Results:   No growth to date.    Culture - Blood (10.08.18 @ 19:50)    Specimen Source: .Blood Blood-Peripheral    Culture Results:   No growth to date.

## 2018-10-10 NOTE — PROGRESS NOTE ADULT - ASSESSMENT
86 y/o m presented to the [facility] ED with progressive weakness and fever. Admitted to medicine for further workup. Pt had recent admisioon on 9/28 reviewed: was here for sepsis due to UTI with septic encephalopathy. He was also found with stool impaction at the time. was discharged with po bactrim to complete 10 days for MSSA on cultures  returned for weakness and fever 101 degrees. admitted and treated empirically for suspected recurrent sepsis  no clear source at this time as UA negative as is CT of the abdomen and pelvis for acute infectious processes  LE duplex with no DVT  CXR limited by low lung volumes  he is currently afebrile      # Fever: with no evidence for sepsis on admission.   - Pt did complain of cough started last week which has been dry.   -CXR stated final read: Developing lower lobe opacities which could reflect pneumonia given the  history of fever. WIll repeat in am. No signs of PNA and exam negative  - Possibly viral illness as well, ordered RVP panel- will fu   - continue cefepime empirically  - FU BCx  -UA neg   -No source of infection found on exam  - ID eval   - hold Ditropan for now    #Progressive weakness- will also check troponin and TTE    # PT / PMR eval for weakness: Patient uses a chair-lift and a walker at home. Patient will need a Pt eval.     # dm: hold orals and keep sliding scale coverage    # BL LE edema: duplex with no DVT. acutwe on chronic. ? related to lack mobility vs other. duplex was negative for a DVT  - hold IVF as long as tolerating po  - elevate the legs  - check TTE      # afib: this is known histionically and was in afib in ER as well.   -he is on ASA. rate is controlled. unable to pull up prior EKGs in EMR. not complaining of cardiac symptoms  -CHADSVAS 4  - EKG does show Afib. Given hx of dementia, likely no candidate for AC.   - would also hold donepezil for same reason    #Prolonged OTC-recheck EKG

## 2018-10-10 NOTE — CONSULT NOTE ADULT - REASON FOR ADMISSION
generalised weakness-------Progressive > 24 hrs duration   [ pt is s/p treatment for uti  }
generalised weakness-------Progressive > 24 hrs duration   [ pt is s/p treatment for uti  }

## 2018-10-10 NOTE — PROGRESS NOTE ADULT - SUBJECTIVE AND OBJECTIVE BOX
Pt seen and examined at bedside. No cp or sob.     PAST MEDICAL & SURGICAL HISTORY:  Dementia  High cholesterol  Hypertension  Diabetes  FH: CABG (coronary artery bypass surgery)      VITAL SIGNS (Last 24 hrs):  T(C): 37.7 (10-10-18 @ 14:27), Max: 37.7 (10-10-18 @ 14:27)  HR: 84 (10-10-18 @ 14:27) (55 - 84)  BP: 145/67 (10-10-18 @ 14:27) (145/67 - 154/67)  RR: 16 (10-10-18 @ 14:27) (16 - 16)  SpO2: --  Wt(kg): --  Daily     Daily     I&O's Summary    09 Oct 2018 07:01  -  10 Oct 2018 07:00  --------------------------------------------------------  IN: 150 mL / OUT: 0 mL / NET: 150 mL    10 Oct 2018 07:01  -  10 Oct 2018 17:49  --------------------------------------------------------  IN: 0 mL / OUT: 2 mL / NET: -2 mL        PHYSICAL EXAM:  GENERAL: NAD, well-developed  HEAD:  Atraumatic, Normocephalic  EYES: EOMI, PERRLA, conjunctiva and sclera clear  NECK: Supple, No JVD  CHEST/LUNG: Clear to auscultation bilaterally; No wheeze  HEART: Regular rate and rhythm; No murmurs, rubs, or gallops  ABDOMEN: Soft, Nontender, Nondistended; Bowel sounds present  EXTREMITIES:  2+ Peripheral Pulses, No clubbing, cyanosis, or edema  PSYCH: AAOx3  NEUROLOGY: non-focal  SKIN: No rashes or lesions    Labs Reviewed  Spoke to patient in regards to abnormal labs.    CBC Full  -  ( 10 Oct 2018 07:02 )  WBC Count : 5.72 K/uL  Hemoglobin : 10.9 g/dL  Hematocrit : 33.8 %  Platelet Count - Automated : 314 K/uL  Mean Cell Volume : 86.7 fL  Mean Cell Hemoglobin : 27.9 pg  Mean Cell Hemoglobin Concentration : 32.2 g/dL  Auto Neutrophil # : x  Auto Lymphocyte # : x  Auto Monocyte # : x  Auto Eosinophil # : x  Auto Basophil # : x  Auto Neutrophil % : x  Auto Lymphocyte % : x  Auto Monocyte % : x  Auto Eosinophil % : x  Auto Basophil % : x    BMP:    10-10 @ 07:02    Blood Urea Nitrogen - 17  Calcium - 8.4  Carbond Dioxide - 27  Chloride - 100  Creatinine - 1.0  Glucose - 162  Potassium - 4.5  Sodium - 139      Hemoglobin A1c -   PT/INR - ( 10 Oct 2018 07:02 )   PT: 15.40 sec;   INR: 1.42 ratio         PTT - ( 10 Oct 2018 07:02 )  PTT:28.3 sec  Urine Culture:  10-08 @ 19:50 Urine culture: --    Culture Results:   No growth to date.  Method Type: --  Organism: --  Organism Identification: --  Specimen Source: .Blood Blood-Peripheral      MEDICATIONS  (STANDING):  aspirin 325 milliGRAM(s) Oral daily  cefepime   IVPB      cefepime   IVPB 1000 milliGRAM(s) IV Intermittent every 8 hours  docusate sodium Oral Tab/Cap - Peds 100 milliGRAM(s) Oral two times a day  heparin SubCutaneous Injection - Peds 5000 Unit(s) SubCutaneous every 8 hours  insulin lispro (HumaLOG) corrective regimen sliding scale   SubCutaneous three times a day before meals  memantine 10 milliGRAM(s) Oral two times a day  pantoprazole    Tablet 40 milliGRAM(s) Oral before breakfast    MEDICATIONS  (PRN):  polyethylene glycol 3350 17 Gram(s) Oral daily PRN Constipation

## 2018-10-10 NOTE — CONSULT NOTE ADULT - ASSESSMENT
IMPRESSION: Rehab of 86 y/o  f  rehab  for  GD debility      PRECAUTIONS: [  ] Cardiac  [  ] Respiratory  [  ] Seizures [  ] Contact Isolation  [  ] Droplet Isolation  [  fall] Other    Weight Bearing Status:     RECOMMENDATION:    Out of Bed to Chair     DVT/Decubiti Prophylaxis    REHAB PLAN:     [  x ] Bedside P/T 3-5 times a week   [   ]   Bedside O/T  2-3 times a week             [   ] No Rehab Therapy Indicated                   [   ]  Speech Therapy   Conditioning/ROM                                    ADL  Bed Mobility                                               Conditioning/ROM  Transfers                                                     Bed Mobility  Sitting /Standing Balance                         Transfers                                        Gait Training                                               Sitting/Standing Balance  Stair Training [   ]Applicable                    Home equipment Eval                                                                        Splinting  [   ] Only      GOALS:   ADL   [ x  ]   Independent                    Transfers  [ x  ] Independent                          Ambulation  [ x] Independent     [  x  ] With device                            [   ]  CG                                                         [   ]  CG                                                                  [   ] CG                            [    ] Min A                                                   [   ] Min A                                                              [   ] Min  A          DISCHARGE PLAN:   [   ]  Good candidate for Intensive Rehabilitation/Hospital based-4A [facility]                                             Will tolerate 3hrs Intensive Rehab Daily                                       [  xx  ]  Short Term Rehab in Skilled Nursing Facility ptn  not  safe  to dc  home  olga  SNF for  STR pt ot                                        [    ]  Home with Outpatient or VN services                                         [    ]  Possible Candidate for Intensive Hospital based Rehab
# Fever  - UA negative  # Developing Pneumonia    would recommend:    1. Obtain RVP and MRSA PCR  2. Obtain Procalcitonin level  3. Continue  Cefepime until work up is done    will follow the patient with you and make further recommendation based on the clinical course and Lab results  Thank you for the opportunity to participate in Mr. CURIEL's care

## 2018-10-11 DIAGNOSIS — N39.0 URINARY TRACT INFECTION, SITE NOT SPECIFIED: ICD-10-CM

## 2018-10-11 LAB
ANION GAP SERPL CALC-SCNC: 15 MMOL/L — HIGH (ref 7–14)
APTT BLD: 31.3 SEC — SIGNIFICANT CHANGE UP (ref 27–39.2)
BUN SERPL-MCNC: 15 MG/DL — SIGNIFICANT CHANGE UP (ref 10–20)
CALCIUM SERPL-MCNC: 8.8 MG/DL — SIGNIFICANT CHANGE UP (ref 8.5–10.1)
CHLORIDE SERPL-SCNC: 98 MMOL/L — SIGNIFICANT CHANGE UP (ref 98–110)
CO2 SERPL-SCNC: 26 MMOL/L — SIGNIFICANT CHANGE UP (ref 17–32)
CREAT SERPL-MCNC: 0.9 MG/DL — SIGNIFICANT CHANGE UP (ref 0.7–1.5)
GLUCOSE BLDC GLUCOMTR-MCNC: 209 MG/DL — HIGH (ref 70–99)
GLUCOSE BLDC GLUCOMTR-MCNC: 276 MG/DL — HIGH (ref 70–99)
GLUCOSE BLDC GLUCOMTR-MCNC: 285 MG/DL — HIGH (ref 70–99)
GLUCOSE BLDC GLUCOMTR-MCNC: 316 MG/DL — HIGH (ref 70–99)
GLUCOSE SERPL-MCNC: 180 MG/DL — HIGH (ref 70–99)
HCT VFR BLD CALC: 35.2 % — LOW (ref 42–52)
HGB BLD-MCNC: 11.3 G/DL — LOW (ref 14–18)
INR BLD: 1.32 RATIO — HIGH (ref 0.65–1.3)
LEGIONELLA AG UR QL: NEGATIVE — SIGNIFICANT CHANGE UP
MAGNESIUM SERPL-MCNC: 2.1 MG/DL — SIGNIFICANT CHANGE UP (ref 1.8–2.4)
MCHC RBC-ENTMCNC: 27.8 PG — SIGNIFICANT CHANGE UP (ref 27–31)
MCHC RBC-ENTMCNC: 32.1 G/DL — SIGNIFICANT CHANGE UP (ref 32–37)
MCV RBC AUTO: 86.7 FL — SIGNIFICANT CHANGE UP (ref 80–94)
NRBC # BLD: 0 /100 WBCS — SIGNIFICANT CHANGE UP (ref 0–0)
PHOSPHATE SERPL-MCNC: 3.3 MG/DL — SIGNIFICANT CHANGE UP (ref 2.1–4.9)
PLATELET # BLD AUTO: 301 K/UL — SIGNIFICANT CHANGE UP (ref 130–400)
POTASSIUM SERPL-MCNC: 4.8 MMOL/L — SIGNIFICANT CHANGE UP (ref 3.5–5)
POTASSIUM SERPL-SCNC: 4.8 MMOL/L — SIGNIFICANT CHANGE UP (ref 3.5–5)
PROCALCITONIN SERPL-MCNC: 0.05 NG/ML — SIGNIFICANT CHANGE UP (ref 0.02–0.1)
PROTHROM AB SERPL-ACNC: 14.3 SEC — HIGH (ref 9.95–12.87)
RBC # BLD: 4.06 M/UL — LOW (ref 4.7–6.1)
RBC # FLD: 14.3 % — SIGNIFICANT CHANGE UP (ref 11.5–14.5)
SODIUM SERPL-SCNC: 139 MMOL/L — SIGNIFICANT CHANGE UP (ref 135–146)
TROPONIN T SERPL-MCNC: 0.03 NG/ML — CRITICAL HIGH
WBC # BLD: 5.2 K/UL — SIGNIFICANT CHANGE UP (ref 4.8–10.8)
WBC # FLD AUTO: 5.2 K/UL — SIGNIFICANT CHANGE UP (ref 4.8–10.8)

## 2018-10-11 RX ORDER — CIPROFLOXACIN LACTATE 400MG/40ML
500 VIAL (ML) INTRAVENOUS EVERY 12 HOURS
Qty: 0 | Refills: 0 | Status: DISCONTINUED | OUTPATIENT
Start: 2018-10-11 | End: 2018-10-12

## 2018-10-11 RX ORDER — INSULIN LISPRO 100/ML
3 VIAL (ML) SUBCUTANEOUS
Qty: 0 | Refills: 0 | Status: DISCONTINUED | OUTPATIENT
Start: 2018-10-11 | End: 2018-10-12

## 2018-10-11 RX ORDER — INSULIN GLARGINE 100 [IU]/ML
8 INJECTION, SOLUTION SUBCUTANEOUS AT BEDTIME
Qty: 0 | Refills: 0 | Status: DISCONTINUED | OUTPATIENT
Start: 2018-10-11 | End: 2018-10-12

## 2018-10-11 RX ORDER — DOCUSATE SODIUM 100 MG
100 CAPSULE ORAL DAILY
Qty: 0 | Refills: 0 | Status: DISCONTINUED | OUTPATIENT
Start: 2018-10-11 | End: 2018-10-11

## 2018-10-11 RX ORDER — SENNA PLUS 8.6 MG/1
1 TABLET ORAL DAILY
Qty: 0 | Refills: 0 | Status: DISCONTINUED | OUTPATIENT
Start: 2018-10-11 | End: 2018-10-12

## 2018-10-11 RX ADMIN — MEMANTINE HYDROCHLORIDE 10 MILLIGRAM(S): 10 TABLET ORAL at 17:01

## 2018-10-11 RX ADMIN — CEFEPIME 100 MILLIGRAM(S): 1 INJECTION, POWDER, FOR SOLUTION INTRAMUSCULAR; INTRAVENOUS at 05:22

## 2018-10-11 RX ADMIN — MEMANTINE HYDROCHLORIDE 10 MILLIGRAM(S): 10 TABLET ORAL at 05:24

## 2018-10-11 RX ADMIN — Medication 3: at 11:52

## 2018-10-11 RX ADMIN — SENNA PLUS 1 TABLET(S): 8.6 TABLET ORAL at 11:52

## 2018-10-11 RX ADMIN — PANTOPRAZOLE SODIUM 40 MILLIGRAM(S): 20 TABLET, DELAYED RELEASE ORAL at 05:24

## 2018-10-11 RX ADMIN — Medication 5 MILLIGRAM(S): at 21:45

## 2018-10-11 RX ADMIN — POLYETHYLENE GLYCOL 3350 17 GRAM(S): 17 POWDER, FOR SOLUTION ORAL at 08:23

## 2018-10-11 RX ADMIN — HEPARIN SODIUM 5000 UNIT(S): 5000 INJECTION INTRAVENOUS; SUBCUTANEOUS at 13:58

## 2018-10-11 RX ADMIN — Medication 2: at 08:21

## 2018-10-11 RX ADMIN — Medication 100 MILLIGRAM(S): at 17:01

## 2018-10-11 RX ADMIN — INSULIN GLARGINE 8 UNIT(S): 100 INJECTION, SOLUTION SUBCUTANEOUS at 21:44

## 2018-10-11 RX ADMIN — HEPARIN SODIUM 5000 UNIT(S): 5000 INJECTION INTRAVENOUS; SUBCUTANEOUS at 05:24

## 2018-10-11 RX ADMIN — Medication 100 MILLIGRAM(S): at 05:24

## 2018-10-11 RX ADMIN — Medication 500 MILLIGRAM(S): at 17:01

## 2018-10-11 RX ADMIN — HEPARIN SODIUM 5000 UNIT(S): 5000 INJECTION INTRAVENOUS; SUBCUTANEOUS at 21:44

## 2018-10-11 RX ADMIN — Medication 325 MILLIGRAM(S): at 13:57

## 2018-10-11 RX ADMIN — Medication 3: at 16:47

## 2018-10-11 NOTE — PROGRESS NOTE ADULT - ASSESSMENT
84 y/o m presented to the [facility] ED with progressive weakness and fever. Admitted to medicine for further workup. Pt had recent admisioon on 9/28 reviewed: was here for sepsis due to UTI with septic encephalopathy. He was also found with stool impaction at the time. was discharged with po bactrim to complete 10 days for MSSA on cultures returned for weakness and fever 101 degrees. admitted and treated empirically for suspected recurrent sepsis  no clear source at this time as UA negative as is CT of the abdomen and pelvis for acute infectious processes. LE duplex with no DVT  CXR limited by low lung volumes  he is currently afebrile      # Fever: with no evidence for sepsis on admission.   - Pt did complain of cough started last week which has been dry.   -CXR stated final read: Developing lower lobe opacities which could reflect pneumonia given the  history of fever. WIll repeat in am. No signs of PNA and exam negative  - Possibly viral illness as well, ordered RVP panel- will fu   - continue cefepime empirically  - FU BCx  -UA neg   -No source of infection found on exam  - ID eval appreciated will Pt transitioned to PO Cipro  - hold Ditropan for now    #Progressive weakness- will also check troponin, trop stable and TTE wnl in 9/26/18    # PT / PMR eval for weakness: Patient uses a chair-lift and a walker at home. Patient will need a Pt eval.     # dm: hold orals and keep sliding scale coverage    # BL LE edema: duplex with no DVT. acutwe on chronic. ? related to lack mobility vs other. duplex was negative for a DVT  - hold IVF as long as tolerating po  - elevate the legs  - TTE WNL 9/26    # afib: this is known histionically and was in afib in ER as well.   -he is on ASA. rate is controlled. unable to pull up prior EKGs in EMR. not complaining of cardiac symptoms  -CHADSVAS 4  - EKG does show Afib. Given hx of dementia, likely no candidate for AC.   - would also hold donepezil for same reason    #Prolonged QTC-recheck EKG    Dispo pt anticipated for DC in am. 86 y/o m presented to the [facility] ED with progressive weakness and fever. Admitted to medicine for further workup. Pt had recent admisioon on 9/28 reviewed: was here for sepsis due to UTI with septic encephalopathy. He was also found with stool impaction at the time. was discharged with po bactrim to complete 10 days for MSSA on cultures returned for weakness and fever 101 degrees. admitted and treated empirically for suspected recurrent sepsis  no clear source at this time as UA negative as is CT of the abdomen and pelvis for acute infectious processes. LE duplex with no DVT  CXR limited by low lung volumes  he is currently afebrile      # Fever: with no evidence for sepsis on admission.   - Pt did complain of cough started last week which has been dry.   -CXR stated final read: Developing lower lobe opacities which could reflect pneumonia given the  history of fever. WIll repeat in am. No signs of PNA and exam negative  - Possibly viral illness as well, ordered RVP panel- will fu   - continue cefepime empirically  - FU BCx  -UA neg   -No source of infection found on exam  - ID eval appreciated will Pt transitioned to PO Cipro  - hold Ditropan for now    #Progressive weakness- will also check troponin, trop stable and TTE wnl in 9/26/18    # PT / PMR eval for weakness: Patient uses a chair-lift and a walker at home. Patient will need a Pt eval.     # dm: hold orals. Pt hyperglycemic. Started on basal insulin, premeal and SS    # BL LE edema: duplex with no DVT. acutwe on chronic. ? related to lack mobility vs other. duplex was negative for a DVT  - hold IVF as long as tolerating po  - elevate the legs  - TTE WNL 9/26    # afib: this is known histionically and was in afib in ER as well.   -he is on ASA. rate is controlled. unable to pull up prior EKGs in EMR. not complaining of cardiac symptoms  -CHADSVAS 4  - EKG does show Afib. Given hx of dementia, likely no candidate for AC.   - would also hold donepezil for same reason    #Prolonged QTC-recheck EKG    Dispo pt anticipated for DC in am.

## 2018-10-11 NOTE — PROGRESS NOTE ADULT - SUBJECTIVE AND OBJECTIVE BOX
infectious diseases progress note:  DIONNE CURIEL is a 85yMale patient    FEVER, SEPSIS    Diabetes  Hypertension  Dementia  Fever      ROS:  no complaints     Allergies    Avandia (Unknown)  Lipitor (Unknown)  metformin (Unknown)    Intolerances        ANTIBIOTICS/RELEVANT:  antimicrobials  cefepime   IVPB      cefepime   IVPB 1000 milliGRAM(s) IV Intermittent every 8 hours    immunologic:    OTHER:  aspirin 325 milliGRAM(s) Oral daily  dextrose 40% Gel 15 Gram(s) Oral once PRN  dextrose 5%. 1000 milliLiter(s) IV Continuous <Continuous>  dextrose 50% Injectable 12.5 Gram(s) IV Push once  dextrose 50% Injectable 25 Gram(s) IV Push once  dextrose 50% Injectable 25 Gram(s) IV Push once  docusate sodium Oral Tab/Cap - Peds 100 milliGRAM(s) Oral two times a day  glucagon  Injectable 1 milliGRAM(s) IntraMuscular once PRN  heparin SubCutaneous Injection - Peds 5000 Unit(s) SubCutaneous every 8 hours  insulin glargine Injectable (LANTUS) 5 Unit(s) SubCutaneous at bedtime  insulin lispro (HumaLOG) corrective regimen sliding scale   SubCutaneous three times a day before meals  memantine 10 milliGRAM(s) Oral two times a day  pantoprazole    Tablet 40 milliGRAM(s) Oral before breakfast  polyethylene glycol 3350 17 Gram(s) Oral daily PRN      Objective:  T(F): 98.4 (10-11-18 @ 05:15), Max: 99.8 (10-10-18 @ 14:27)  HR: 85 (10-11-18 @ 05:15) (71 - 85)  BP: 153/74 (10-11-18 @ 05:15) (143/71 - 153/74)  RR: 16 (10-11-18 @ 05:15) (16 - 16)  SpO2: --    PHYSICAL EXAM:  Constitutional:Well-developed, well nourished  Eyes:MOY, EOMI  Ear/Nose/Throat: no oral lesion, no sinus tenderness on percussion. no nasal congestion 	  Neck:no JVD, no lymphadenopathy, supple  Respiratory: CTA chandni  Cardiovascular: S1S2 present   Gastrointestinal:soft, (+) BS, no HSM  Extremities:no phlebitis         LABS:                        10.9   5.72  )-----------( 314      ( 10 Oct 2018 07:02 )             33.8     10-10    139  |  100  |  17  ----------------------------<  162<H>  4.5   |  27  |  1.0    Ca    8.4<L>      10 Oct 2018 07:02  Mg     2.1     10-10    TPro  5.6<L>  /  Alb  3.1<L>  /  TBili  0.4  /  DBili  x   /  AST  11  /  ALT  9   /  AlkPhos  63  10-10    PT/INR - ( 10 Oct 2018 07:02 )   PT: 15.40 sec;   INR: 1.42 ratio         PTT - ( 10 Oct 2018 07:02 )  PTT:28.3 sec        MICROBIOLOGY:    Culture - Urine (collected 10-09-18 @ 00:15)  Source: .Urine Clean Catch (Midstream)  Final Report (10-10-18 @ 22:19):    No growth    Culture - Blood (collected 10-08-18 @ 19:50)  Source: .Blood Blood-Peripheral  Preliminary Report (10-10-18 @ 02:09):    No growth to date.    Culture - Blood (collected 10-08-18 @ 19:50)  Source: .Blood Blood-Peripheral  Preliminary Report (10-10-18 @ 02:09):    No growth to date.        Culture - Urine (collected 09 Oct 2018 00:15)  Source: .Urine Clean Catch (Midstream)  Final Report (10 Oct 2018 22:19):    No growth    Culture - Blood (collected 08 Oct 2018 19:50)  Source: .Blood Blood-Peripheral  Preliminary Report (10 Oct 2018 02:09):    No growth to date.    Culture - Blood (collected 08 Oct 2018 19:50)  Source: .Blood Blood-Peripheral  Preliminary Report (10 Oct 2018 02:09):    No growth to date.      Culture Results:   No growth (10-09 @ 00:15)  Culture Results:   No growth to date. (10-08 @ 19:50)  Culture Results:   No growth to date. (10-08 @ 19:50)        RADIOLOGY & ADDITIONAL STUDIES:

## 2018-10-11 NOTE — PROGRESS NOTE ADULT - PROBLEM SELECTOR PLAN 1
unremarkable UA plus negative urine cx  clinically cloudy UA plus Ct suggestive of cystitis    Needs out pt urology eval - recurrent UTIs    No pulmonary symptoms  doubt pneumonia  DC planning   PO Cipro 500 mg Q12 - 7 days  LAXATIVES  recall if needed

## 2018-10-11 NOTE — PROGRESS NOTE ADULT - SUBJECTIVE AND OBJECTIVE BOX
Pt seen and examined at bedside. No cp or sob.     PAST MEDICAL & SURGICAL HISTORY:  Dementia  High cholesterol  Hypertension  Diabetes  FH: CABG (coronary artery bypass surgery)      Vital Signs (24 Hrs):  T(C): 36.9 (10-11-18 @ 14:24), Max: 36.9 (10-11-18 @ 05:15)  HR: 96 (10-11-18 @ 14:24) (71 - 96)  BP: 113/87 (10-11-18 @ 14:24) (113/87 - 153/74)  RR: 16 (10-11-18 @ 14:24) (16 - 16)  SpO2: --  Wt(kg): --  Daily     Daily     I&O's Summary    10 Oct 2018 07:01  -  11 Oct 2018 07:00  --------------------------------------------------------  IN: 0 mL / OUT: 2 mL / NET: -2 mL    11 Oct 2018 07:01  -  11 Oct 2018 17:08  --------------------------------------------------------  IN: 480 mL / OUT: 0 mL / NET: 480 mL      PHYSICAL EXAM:  GENERAL: NAD, well-developed  HEAD:  Atraumatic, Normocephalic  EYES: EOMI, PERRLA, conjunctiva and sclera clear  NECK: Supple, No JVD  CHEST/LUNG: Clear to auscultation bilaterally; No wheeze  HEART: Regular rate and rhythm; No murmurs, rubs, or gallops  ABDOMEN: Soft, Nontender, Nondistended; Bowel sounds present  EXTREMITIES:  2+ Peripheral Pulses, No clubbing, cyanosis, or edema  PSYCH: AAOx3  NEUROLOGY: non-focal  SKIN: No rashes or lesions    Spoke to patient in regards to abnormal labs.    CBC Full  -  ( 11 Oct 2018 08:48 )  WBC Count : 5.20 K/uL  Hemoglobin : 11.3 g/dL  Hematocrit : 35.2 %  Platelet Count - Automated : 301 K/uL  Mean Cell Volume : 86.7 fL  Mean Cell Hemoglobin : 27.8 pg  Mean Cell Hemoglobin Concentration : 32.1 g/dL  Auto Neutrophil # : x  Auto Lymphocyte # : x  Auto Monocyte # : x  Auto Eosinophil # : x  Auto Basophil # : x  Auto Neutrophil % : x  Auto Lymphocyte % : x  Auto Monocyte % : x  Auto Eosinophil % : x  Auto Basophil % : x    BMP:    10-11 @ 08:48    Blood Urea Nitrogen - 15  Calcium - 8.8  Carbond Dioxide - 26  Chloride - 98  Creatinine - 0.9  Glucose - 180  Potassium - 4.8  Sodium - 139      Hemoglobin A1c -   PT/INR - ( 11 Oct 2018 08:48 )   PT: 14.30 sec;   INR: 1.32 ratio         PTT - ( 11 Oct 2018 08:48 )  PTT:31.3 sec  Urine Culture:  10-09 @ 00:15 Urine culture: --    Culture Results:   No growth  Method Type: --  Organism: --  Organism Identification: --  Specimen Source: .Urine Clean Catch (Midstream)  10-08 @ 19:50 Urine culture: --    Culture Results:   No growth to date.  Method Type: --  Organism: --  Organism Identification: --  Specimen Source: .Blood Blood-Peripheral      MEDICATIONS  (STANDING):  aspirin 325 milliGRAM(s) Oral daily  bisacodyl 5 milliGRAM(s) Oral at bedtime  ciprofloxacin     Tablet 500 milliGRAM(s) Oral every 12 hours  dextrose 5%. 1000 milliLiter(s) (50 mL/Hr) IV Continuous <Continuous>  dextrose 50% Injectable 12.5 Gram(s) IV Push once  dextrose 50% Injectable 25 Gram(s) IV Push once  dextrose 50% Injectable 25 Gram(s) IV Push once  docusate sodium Oral Tab/Cap - Peds 100 milliGRAM(s) Oral two times a day  heparin SubCutaneous Injection - Peds 5000 Unit(s) SubCutaneous every 8 hours  insulin glargine Injectable (LANTUS) 5 Unit(s) SubCutaneous at bedtime  insulin lispro (HumaLOG) corrective regimen sliding scale   SubCutaneous three times a day before meals  memantine 10 milliGRAM(s) Oral two times a day  pantoprazole    Tablet 40 milliGRAM(s) Oral before breakfast  senna 1 Tablet(s) Oral daily    MEDICATIONS  (PRN):  dextrose 40% Gel 15 Gram(s) Oral once PRN Blood Glucose LESS THAN 70 milliGRAM(s)/deciliter  glucagon  Injectable 1 milliGRAM(s) IntraMuscular once PRN Glucose LESS THAN 70 milligrams/deciliter  polyethylene glycol 3350 17 Gram(s) Oral daily PRN Constipation

## 2018-10-12 ENCOUNTER — TRANSCRIPTION ENCOUNTER (OUTPATIENT)
Age: 83
End: 2018-10-12

## 2018-10-12 VITALS
SYSTOLIC BLOOD PRESSURE: 164 MMHG | TEMPERATURE: 97 F | HEART RATE: 74 BPM | DIASTOLIC BLOOD PRESSURE: 77 MMHG | RESPIRATION RATE: 16 BRPM

## 2018-10-12 LAB
ANION GAP SERPL CALC-SCNC: 12 MMOL/L — SIGNIFICANT CHANGE UP (ref 7–14)
BUN SERPL-MCNC: 16 MG/DL — SIGNIFICANT CHANGE UP (ref 10–20)
CALCIUM SERPL-MCNC: 9 MG/DL — SIGNIFICANT CHANGE UP (ref 8.5–10.1)
CHLORIDE SERPL-SCNC: 98 MMOL/L — SIGNIFICANT CHANGE UP (ref 98–110)
CO2 SERPL-SCNC: 29 MMOL/L — SIGNIFICANT CHANGE UP (ref 17–32)
CREAT SERPL-MCNC: 0.9 MG/DL — SIGNIFICANT CHANGE UP (ref 0.7–1.5)
GLUCOSE BLDC GLUCOMTR-MCNC: 195 MG/DL — HIGH (ref 70–99)
GLUCOSE BLDC GLUCOMTR-MCNC: 328 MG/DL — HIGH (ref 70–99)
GLUCOSE SERPL-MCNC: 207 MG/DL — HIGH (ref 70–99)
HCT VFR BLD CALC: 34.9 % — LOW (ref 42–52)
HGB BLD-MCNC: 11 G/DL — LOW (ref 14–18)
MAGNESIUM SERPL-MCNC: 2.1 MG/DL — SIGNIFICANT CHANGE UP (ref 1.8–2.4)
MCHC RBC-ENTMCNC: 27.2 PG — SIGNIFICANT CHANGE UP (ref 27–31)
MCHC RBC-ENTMCNC: 31.5 G/DL — LOW (ref 32–37)
MCV RBC AUTO: 86.4 FL — SIGNIFICANT CHANGE UP (ref 80–94)
NRBC # BLD: 0 /100 WBCS — SIGNIFICANT CHANGE UP (ref 0–0)
PHOSPHATE SERPL-MCNC: 3.3 MG/DL — SIGNIFICANT CHANGE UP (ref 2.1–4.9)
PLATELET # BLD AUTO: 323 K/UL — SIGNIFICANT CHANGE UP (ref 130–400)
POTASSIUM SERPL-MCNC: 4.7 MMOL/L — SIGNIFICANT CHANGE UP (ref 3.5–5)
POTASSIUM SERPL-SCNC: 4.7 MMOL/L — SIGNIFICANT CHANGE UP (ref 3.5–5)
RBC # BLD: 4.04 M/UL — LOW (ref 4.7–6.1)
RBC # FLD: 14.3 % — SIGNIFICANT CHANGE UP (ref 11.5–14.5)
S PNEUM AG UR QL: NEGATIVE — SIGNIFICANT CHANGE UP
SODIUM SERPL-SCNC: 139 MMOL/L — SIGNIFICANT CHANGE UP (ref 135–146)
WBC # BLD: 5.04 K/UL — SIGNIFICANT CHANGE UP (ref 4.8–10.8)
WBC # FLD AUTO: 5.04 K/UL — SIGNIFICANT CHANGE UP (ref 4.8–10.8)

## 2018-10-12 RX ORDER — CIPROFLOXACIN LACTATE 400MG/40ML
1 VIAL (ML) INTRAVENOUS
Qty: 0 | Refills: 0 | COMMUNITY
Start: 2018-10-12 | End: 2018-10-18

## 2018-10-12 RX ADMIN — HEPARIN SODIUM 5000 UNIT(S): 5000 INJECTION INTRAVENOUS; SUBCUTANEOUS at 05:34

## 2018-10-12 RX ADMIN — Medication 100 MILLIGRAM(S): at 05:34

## 2018-10-12 RX ADMIN — SENNA PLUS 1 TABLET(S): 8.6 TABLET ORAL at 11:37

## 2018-10-12 RX ADMIN — Medication 4: at 11:38

## 2018-10-12 RX ADMIN — MEMANTINE HYDROCHLORIDE 10 MILLIGRAM(S): 10 TABLET ORAL at 05:34

## 2018-10-12 RX ADMIN — Medication 325 MILLIGRAM(S): at 11:37

## 2018-10-12 RX ADMIN — Medication 500 MILLIGRAM(S): at 05:34

## 2018-10-12 RX ADMIN — Medication 3 UNIT(S): at 11:39

## 2018-10-12 RX ADMIN — Medication 3 UNIT(S): at 08:23

## 2018-10-12 RX ADMIN — PANTOPRAZOLE SODIUM 40 MILLIGRAM(S): 20 TABLET, DELAYED RELEASE ORAL at 08:30

## 2018-10-12 RX ADMIN — Medication 1: at 08:23

## 2018-10-12 NOTE — DISCHARGE NOTE ADULT - HOSPITAL COURSE
84 y/o m presented to the [facility] ED with progressive weakness and fever.  Pt had recent admission on 9/28 reviewed: was here for sepsis due to UTI with septic encephalopathy. He was also found with stool impaction at the time. He was discharged with po bactrim to complete 10 days for MSSA on cultures returned for weakness and fever 101 degrees.     During this admission he had an infectious work up. He was started on broad spec abx initially.   CXR neg. CT abd neg for infectious process. LE doppler neg for DVT.  UA was also done which was neg. UClx neg. Pt seen by ID recommended placing the patient on PO Cipro for 7 days for Cloudy Urine which could suggest cyistitis. He will also need a fu with Urology as outpt for recurrent UTIs.  Pt cleard for DC today. Labs unremarkable. No wbc. Vitals stable afebrile.  For his progessive weakness ekg done wnl, tte wnl, and trops minimally elevated but not higher than his baseline and CKMBs wnl.           #Cystitis  - continue cipro for 7 days last dose 10/18  -Urology referral as outpt for recurrent UTI    # dm: continue home meds    # afib: continue Aspirin, likely not candidate for AC due to dementia- fu outpt cardiology referral and PCP for further management      #dementia- continue home meds    #incontinence- continue oxybutin.

## 2018-10-12 NOTE — DISCHARGE NOTE ADULT - MEDICATION SUMMARY - MEDICATIONS TO TAKE
I will START or STAY ON the medications listed below when I get home from the hospital:    aspirin 325 mg oral tablet  -- 1 tab(s) by mouth once a day  -- Indication: For cad prevention    pioglitazone 45 mg oral tablet  -- 1 tab(s) by mouth once a day  -- Indication: For Diabetes    glipiZIDE 10 mg oral tablet, extended release  -- 2 tab(s) by mouth 2 times a day  -- Indication: For Diabetes    Welchol 625 mg oral tablet  -- 3 tab(s) by mouth 2 times a day  -- Indication: For High cholestrol    donepezil 10 mg oral tablet  -- orally once a day  -- Indication: For Dementia    Colace 100 mg oral capsule  -- Indication: For constipation    polyethylene glycol 3350 oral powder for reconstitution  -- 17 gram(s) by mouth once a day, As Needed  -- Indication: For constipation    memantine 10 mg oral tablet  -- 1 tab(s) by mouth 2 times a day  -- Indication: For Dementia    ciprofloxacin 500 mg oral tablet  -- 1 tab(s) by mouth every 12 hours for 7 days last dose 10/18  -- Indication: For Urinary tract infection without hematuria, site unspecified    oxybutynin 10 mg/24 hr oral tablet, extended release  -- orally once a day (at bedtime)  -- Indication: For incontinence

## 2018-10-12 NOTE — PROGRESS NOTE ADULT - SUBJECTIVE AND OBJECTIVE BOX
Pt seen and examined at bedside. No CP or SOB. No events ovenight    PAST MEDICAL & SURGICAL HISTORY:  Dementia  High cholesterol  Hypertension  Diabetes  FH: CABG (coronary artery bypass surgery)      VITAL SIGNS (Last 24 hrs):  T(C): 36.3 (10-12-18 @ 05:15), Max: 36.8 (10-11-18 @ 22:02)  HR: 74 (10-12-18 @ 05:15) (74 - 86)  BP: 164/77 (10-12-18 @ 05:15) (94/54 - 164/77)  RR: 16 (10-12-18 @ 05:15) (16 - 16)  SpO2: --  Wt(kg): --  Daily     Daily     I&O's Summary    11 Oct 2018 07:01  -  12 Oct 2018 07:00  --------------------------------------------------------  IN: 480 mL / OUT: 0 mL / NET: 480 mL    12 Oct 2018 07:01  -  12 Oct 2018 14:38  --------------------------------------------------------  IN: 120 mL / OUT: 0 mL / NET: 120 mL        PHYSICAL EXAM:  GENERAL: NAD, well-developed  HEAD:  Atraumatic, Normocephalic  EYES: EOMI, PERRLA, conjunctiva and sclera clear  NECK: Supple, No JVD  CHEST/LUNG: Clear to auscultation bilaterally; No wheeze  HEART: Regular rate and rhythm; No murmurs, rubs, or gallops  ABDOMEN: Soft, Nontender, Nondistended; Bowel sounds present  EXTREMITIES:  2+ Peripheral Pulses, No clubbing, cyanosis, or edema  PSYCH: AAOx3  NEUROLOGY: non-focal  SKIN: No rashes or lesions    Labs Reviewed  Spoke to patient in regards to abnormal labs.    CBC Full  -  ( 12 Oct 2018 06:48 )  WBC Count : 5.04 K/uL  Hemoglobin : 11.0 g/dL  Hematocrit : 34.9 %  Platelet Count - Automated : 323 K/uL  Mean Cell Volume : 86.4 fL  Mean Cell Hemoglobin : 27.2 pg  Mean Cell Hemoglobin Concentration : 31.5 g/dL  Auto Neutrophil # : x  Auto Lymphocyte # : x  Auto Monocyte # : x  Auto Eosinophil # : x  Auto Basophil # : x  Auto Neutrophil % : x  Auto Lymphocyte % : x  Auto Monocyte % : x  Auto Eosinophil % : x  Auto Basophil % : x    BMP:    10-12 @ 06:48    Blood Urea Nitrogen - 16  Calcium - 9.0  Carbond Dioxide - 29  Chloride - 98  Creatinine - 0.9  Glucose - 207  Potassium - 4.7  Sodium - 139      Hemoglobin A1c -   PT/INR - ( 11 Oct 2018 08:48 )   PT: 14.30 sec;   INR: 1.32 ratio         PTT - ( 11 Oct 2018 08:48 )  PTT:31.3 sec  Urine Culture:  10-09 @ 00:15 Urine culture: --    Culture Results:   No growth  Method Type: --  Organism: --  Organism Identification: --  Specimen Source: .Urine Clean Catch (Midstream)  10-08 @ 19:50 Urine culture: --    Culture Results:   No growth to date.  Method Type: --  Organism: --  Organism Identification: --  Specimen Source: .Blood Blood-Peripheral      MEDICATIONS  (STANDING):  aspirin 325 milliGRAM(s) Oral daily  bisacodyl 5 milliGRAM(s) Oral at bedtime  ciprofloxacin     Tablet 500 milliGRAM(s) Oral every 12 hours  dextrose 5%. 1000 milliLiter(s) (50 mL/Hr) IV Continuous <Continuous>  dextrose 50% Injectable 12.5 Gram(s) IV Push once  dextrose 50% Injectable 25 Gram(s) IV Push once  dextrose 50% Injectable 25 Gram(s) IV Push once  docusate sodium Oral Tab/Cap - Peds 100 milliGRAM(s) Oral two times a day  heparin SubCutaneous Injection - Peds 5000 Unit(s) SubCutaneous every 8 hours  insulin glargine Injectable (LANTUS) 8 Unit(s) SubCutaneous at bedtime  insulin lispro (HumaLOG) corrective regimen sliding scale   SubCutaneous three times a day before meals  insulin lispro Injectable (HumaLOG) 3 Unit(s) SubCutaneous before breakfast  insulin lispro Injectable (HumaLOG) 3 Unit(s) SubCutaneous before lunch  insulin lispro Injectable (HumaLOG) 3 Unit(s) SubCutaneous before dinner  memantine 10 milliGRAM(s) Oral two times a day  pantoprazole    Tablet 40 milliGRAM(s) Oral before breakfast  senna 1 Tablet(s) Oral daily    MEDICATIONS  (PRN):  dextrose 40% Gel 15 Gram(s) Oral once PRN Blood Glucose LESS THAN 70 milliGRAM(s)/deciliter  glucagon  Injectable 1 milliGRAM(s) IntraMuscular once PRN Glucose LESS THAN 70 milligrams/deciliter  polyethylene glycol 3350 17 Gram(s) Oral daily PRN Constipation

## 2018-10-12 NOTE — DISCHARGE NOTE ADULT - PATIENT PORTAL LINK FT
You can access the [facility] Patient Portal, offered by [facility], by registering with the following website: [email or web address]

## 2018-10-12 NOTE — DISCHARGE NOTE ADULT - CARE PLAN
Principal Discharge DX:	Urinary tract infection without hematuria, site unspecified  Goal:	continue CIPRO LAST dose 10/18  Assessment and plan of treatment:	continue CIPRO LAST dose 10/18

## 2018-10-12 NOTE — DISCHARGE NOTE ADULT - CARE PROVIDER_API CALL
Geremias Acuña), Internal Medicine  [address] [place] [id number]  Phone: [phone]  Fax: [phone]

## 2018-10-12 NOTE — PROGRESS NOTE ADULT - REASON FOR ADMISSION
generalised weakness-------Progressive > 24 hrs duration   [ pt is s/p treatment for uti  }

## 2018-10-12 NOTE — PROGRESS NOTE ADULT - ASSESSMENT
84 y/o m presented to the [facility] ED with progressive weakness and fever.  Pt had recent admission on 9/28 reviewed: was here for sepsis due to UTI with septic encephalopathy. He was also found with stool impaction at the time. He was discharged with po bactrim to complete 10 days for MSSA on cultures returned for weakness and fever 101 degrees.     During this admission he had an infectious work up. He was started on broad spec abx initially.   CXR neg. CT abd neg for infectious process. LE doppler neg for DVT.  UA was also done which was neg. UClx neg. Pt seen by ID recommended placing the patient on PO Cipro for 7 days for Cloudy Urine which could suggest cyistitis. He will also need a fu with Urology as outpt for recurrent UTIs.  Pt cleard for DC today. Labs unremarkable. No wbc. Vitals stable afebrile.  For his progessive weakness ekg done wnl, tte wnl, and trops minimally elevated but not higher than his baseline and CKMBs wnl.     #Cystitis  - continue cipro for 7 days last dose 10/18  -Urology referral as outpt for recurrent UTI    # dm: continue home meds    # afib: continue Aspirin, likely not candidate for AC due to dementia- fu outpt cardiology referral and PCP for further management      #dementia- continue home meds    #incontinence- continue oxybutin.     Dispo: Pt cleared for DC. FU PCP and Urology outpt

## 2018-10-14 LAB
CULTURE RESULTS: SIGNIFICANT CHANGE UP
CULTURE RESULTS: SIGNIFICANT CHANGE UP
SPECIMEN SOURCE: SIGNIFICANT CHANGE UP
SPECIMEN SOURCE: SIGNIFICANT CHANGE UP

## 2018-10-18 ENCOUNTER — OUTPATIENT (OUTPATIENT)
Dept: OUTPATIENT SERVICES | Facility: HOSPITAL | Age: 83
LOS: 1 days | Discharge: HOME | End: 2018-10-18

## 2018-10-18 DIAGNOSIS — R32 UNSPECIFIED URINARY INCONTINENCE: ICD-10-CM

## 2018-10-18 DIAGNOSIS — Z79.84 LONG TERM (CURRENT) USE OF ORAL HYPOGLYCEMIC DRUGS: ICD-10-CM

## 2018-10-18 DIAGNOSIS — R50.9 FEVER, UNSPECIFIED: ICD-10-CM

## 2018-10-18 DIAGNOSIS — E11.9 TYPE 2 DIABETES MELLITUS WITHOUT COMPLICATIONS: ICD-10-CM

## 2018-10-18 DIAGNOSIS — R60.0 LOCALIZED EDEMA: ICD-10-CM

## 2018-10-18 DIAGNOSIS — I45.81 LONG QT SYNDROME: ICD-10-CM

## 2018-10-18 DIAGNOSIS — F03.90 UNSPECIFIED DEMENTIA WITHOUT BEHAVIORAL DISTURBANCE: ICD-10-CM

## 2018-10-18 DIAGNOSIS — E10.9 TYPE 1 DIABETES MELLITUS WITHOUT COMPLICATIONS: ICD-10-CM

## 2018-10-18 DIAGNOSIS — N30.90 CYSTITIS, UNSPECIFIED WITHOUT HEMATURIA: ICD-10-CM

## 2018-10-18 DIAGNOSIS — Z95.1 PRESENCE OF AORTOCORONARY BYPASS GRAFT: ICD-10-CM

## 2018-10-18 DIAGNOSIS — Z84.89 FAMILY HISTORY OF OTHER SPECIFIED CONDITIONS: Chronic | ICD-10-CM

## 2018-10-18 DIAGNOSIS — I25.10 ATHEROSCLEROTIC HEART DISEASE OF NATIVE CORONARY ARTERY WITHOUT ANGINA PECTORIS: ICD-10-CM

## 2018-10-18 DIAGNOSIS — E78.00 PURE HYPERCHOLESTEROLEMIA, UNSPECIFIED: ICD-10-CM

## 2018-10-18 DIAGNOSIS — I48.91 UNSPECIFIED ATRIAL FIBRILLATION: ICD-10-CM

## 2018-10-18 DIAGNOSIS — I10 ESSENTIAL (PRIMARY) HYPERTENSION: ICD-10-CM

## 2018-10-21 ENCOUNTER — INPATIENT (INPATIENT)
Facility: HOSPITAL | Age: 83
LOS: 1 days | Discharge: NURSING, REHAB OR LONG-TERM CARE | End: 2018-10-23
Attending: INTERNAL MEDICINE | Admitting: INTERNAL MEDICINE
Payer: MEDICARE

## 2018-10-21 VITALS
RESPIRATION RATE: 18 BRPM | OXYGEN SATURATION: 96 % | DIASTOLIC BLOOD PRESSURE: 68 MMHG | HEART RATE: 87 BPM | SYSTOLIC BLOOD PRESSURE: 131 MMHG

## 2018-10-21 DIAGNOSIS — R32 UNSPECIFIED URINARY INCONTINENCE: ICD-10-CM

## 2018-10-21 DIAGNOSIS — I26.99 OTHER PULMONARY EMBOLISM WITHOUT ACUTE COR PULMONALE: ICD-10-CM

## 2018-10-21 DIAGNOSIS — I25.10 ATHEROSCLEROTIC HEART DISEASE OF NATIVE CORONARY ARTERY WITHOUT ANGINA PECTORIS: ICD-10-CM

## 2018-10-21 DIAGNOSIS — Z84.89 FAMILY HISTORY OF OTHER SPECIFIED CONDITIONS: Chronic | ICD-10-CM

## 2018-10-21 DIAGNOSIS — Z95.1 PRESENCE OF AORTOCORONARY BYPASS GRAFT: ICD-10-CM

## 2018-10-21 DIAGNOSIS — I10 ESSENTIAL (PRIMARY) HYPERTENSION: ICD-10-CM

## 2018-10-21 DIAGNOSIS — I48.0 PAROXYSMAL ATRIAL FIBRILLATION: ICD-10-CM

## 2018-10-21 DIAGNOSIS — E11.9 TYPE 2 DIABETES MELLITUS WITHOUT COMPLICATIONS: ICD-10-CM

## 2018-10-21 DIAGNOSIS — E78.5 HYPERLIPIDEMIA, UNSPECIFIED: ICD-10-CM

## 2018-10-21 DIAGNOSIS — Z87.440 PERSONAL HISTORY OF URINARY (TRACT) INFECTIONS: ICD-10-CM

## 2018-10-21 DIAGNOSIS — Z79.84 LONG TERM (CURRENT) USE OF ORAL HYPOGLYCEMIC DRUGS: ICD-10-CM

## 2018-10-21 DIAGNOSIS — F03.90 UNSPECIFIED DEMENTIA WITHOUT BEHAVIORAL DISTURBANCE: ICD-10-CM

## 2018-10-21 LAB
APPEARANCE UR: CLEAR — SIGNIFICANT CHANGE UP
BASE EXCESS BLDV CALC-SCNC: 2.5 MMOL/L — HIGH (ref -2–2)
BASOPHILS # BLD AUTO: 0.05 K/UL — SIGNIFICANT CHANGE UP (ref 0–0.2)
BASOPHILS NFR BLD AUTO: 0.5 % — SIGNIFICANT CHANGE UP (ref 0–1)
BILIRUB UR-MCNC: NEGATIVE — SIGNIFICANT CHANGE UP
CA-I SERPL-SCNC: 1.17 MMOL/L — SIGNIFICANT CHANGE UP (ref 1.12–1.3)
COLOR SPEC: YELLOW — SIGNIFICANT CHANGE UP
DIFF PNL FLD: NEGATIVE — SIGNIFICANT CHANGE UP
EOSINOPHIL # BLD AUTO: 0.04 K/UL — SIGNIFICANT CHANGE UP (ref 0–0.7)
EOSINOPHIL NFR BLD AUTO: 0.4 % — SIGNIFICANT CHANGE UP (ref 0–8)
GAS PNL BLDV: 141 MMOL/L — SIGNIFICANT CHANGE UP (ref 136–145)
GAS PNL BLDV: SIGNIFICANT CHANGE UP
GLUCOSE UR QL: NEGATIVE MG/DL — SIGNIFICANT CHANGE UP
HCO3 BLDV-SCNC: 26 MMOL/L — SIGNIFICANT CHANGE UP (ref 22–29)
HCT VFR BLD CALC: 32.9 % — LOW (ref 42–52)
HCT VFR BLDA CALC: 21.9 % — LOW (ref 34–44)
HGB BLD CALC-MCNC: 7.2 G/DL — LOW (ref 14–18)
HGB BLD-MCNC: 10.6 G/DL — LOW (ref 14–18)
IMM GRANULOCYTES NFR BLD AUTO: 0.4 % — HIGH (ref 0.1–0.3)
INR BLD: 1.36 RATIO — HIGH (ref 0.65–1.3)
KETONES UR-MCNC: ABNORMAL
LACTATE BLDV-MCNC: 1.1 MMOL/L — SIGNIFICANT CHANGE UP (ref 0.5–1.6)
LEUKOCYTE ESTERASE UR-ACNC: ABNORMAL
LYMPHOCYTES # BLD AUTO: 1.22 K/UL — SIGNIFICANT CHANGE UP (ref 1.2–3.4)
LYMPHOCYTES # BLD AUTO: 12 % — LOW (ref 20.5–51.1)
MCHC RBC-ENTMCNC: 27.2 PG — SIGNIFICANT CHANGE UP (ref 27–31)
MCHC RBC-ENTMCNC: 32.2 G/DL — SIGNIFICANT CHANGE UP (ref 32–37)
MCV RBC AUTO: 84.6 FL — SIGNIFICANT CHANGE UP (ref 80–94)
MONOCYTES # BLD AUTO: 0.56 K/UL — SIGNIFICANT CHANGE UP (ref 0.1–0.6)
MONOCYTES NFR BLD AUTO: 5.5 % — SIGNIFICANT CHANGE UP (ref 1.7–9.3)
NEUTROPHILS # BLD AUTO: 8.27 K/UL — HIGH (ref 1.4–6.5)
NEUTROPHILS NFR BLD AUTO: 81.2 % — HIGH (ref 42.2–75.2)
NITRITE UR-MCNC: NEGATIVE — SIGNIFICANT CHANGE UP
NRBC # BLD: 0 /100 WBCS — SIGNIFICANT CHANGE UP (ref 0–0)
PCO2 BLDV: 34 MMHG — LOW (ref 41–51)
PH BLDV: 7.48 — HIGH (ref 7.26–7.43)
PH UR: 6 — SIGNIFICANT CHANGE UP (ref 5–8)
PLATELET # BLD AUTO: 270 K/UL — SIGNIFICANT CHANGE UP (ref 130–400)
PO2 BLDV: 48 MMHG — HIGH (ref 20–40)
POTASSIUM BLDV-SCNC: 3.6 MMOL/L — SIGNIFICANT CHANGE UP (ref 3.3–5.6)
PROT UR-MCNC: ABNORMAL MG/DL
PROTHROM AB SERPL-ACNC: 15.6 SEC — HIGH (ref 9.95–12.87)
RBC # BLD: 3.89 M/UL — LOW (ref 4.7–6.1)
RBC # FLD: 14.7 % — HIGH (ref 11.5–14.5)
SAO2 % BLDV: 86 % — SIGNIFICANT CHANGE UP
SP GR SPEC: 1.02 — SIGNIFICANT CHANGE UP (ref 1.01–1.03)
UROBILINOGEN FLD QL: 0.2 MG/DL — SIGNIFICANT CHANGE UP (ref 0.2–0.2)
WBC # BLD: 10.18 K/UL — SIGNIFICANT CHANGE UP (ref 4.8–10.8)
WBC # FLD AUTO: 10.18 K/UL — SIGNIFICANT CHANGE UP (ref 4.8–10.8)

## 2018-10-21 NOTE — ED ADULT NURSE NOTE - NSIMPLEMENTINTERV_GEN_ALL_ED
Implemented All Fall Risk Interventions:  [place] to call system. Call bell, personal items and telephone within reach. Instruct patient to call for assistance. Room bathroom lighting operational. Non-slip footwear when patient is off stretcher. Physically safe environment: no spills, clutter or unnecessary equipment. Stretcher in lowest position, wheels locked, appropriate side rails in place. Provide visual cue, wrist band, yellow gown, etc. Monitor gait and stability. Monitor for mental status changes and reorient to person, place, and time. Review medications for side effects contributing to fall risk. Reinforce activity limits and safety measures with patient and family.

## 2018-10-21 NOTE — ED PROVIDER NOTE - PLAN OF CARE
85m w CAD and recent hospitalization sent in from rehab for eval of episode of chest pain and hypoxia. currently asymptomatic, nontoxic appearing, n/v intact, no resp distress. --CBC, CMP, EKG, enzymes, lipase, lactate, dimer, p-BNP, CXR. --Analgesia as needed, observe/re-assess

## 2018-10-21 NOTE — ED ADULT NURSE NOTE - PMH
CAD (coronary artery disease)    Dementia    Dementia    Diabetes    High cholesterol    Hypertension

## 2018-10-21 NOTE — ED ADULT NURSE NOTE - OBJECTIVE STATEMENT
was sent from [facility] for chest pain , hypoxia and elevated blood pressure . Patient denied cp in triage, blood pressure in normal range .

## 2018-10-21 NOTE — ED PROVIDER NOTE - CARE PLAN
Assessment and plan of treatment:	85m w CAD and recent hospitalization sent in from rehab for eval of episode of chest pain and hypoxia. currently asymptomatic, nontoxic appearing, n/v intact, no resp distress. --CBC, CMP, EKG, enzymes, lipase, lactate, dimer, p-BNP, CXR. --Analgesia as needed, observe/re-assess Principal Discharge DX:	Pulmonary embolism  Assessment and plan of treatment:	85m w CAD and recent hospitalization sent in from rehab for eval of episode of chest pain and hypoxia. currently asymptomatic, nontoxic appearing, n/v intact, no resp distress. --CBC, CMP, EKG, enzymes, lipase, lactate, dimer, p-BNP, CXR. --Analgesia as needed, observe/re-assess

## 2018-10-21 NOTE — ED PROVIDER NOTE - PHYSICAL EXAMINATION
4 Physical Exam  General: Awake, alert, NAD, WDWN, non-toxic appearing, NCAT  Eyes: PERRL, EOMI, no icterus, lids and conjunctivae are normal  ENT: External inspection normal, pink/moist membranes, pharynx normal  CV: S1S2, regular rate and rhythm, no murmur/gallops/rubs, no JVD, 2+ pulses b/l, b/l LE large chronic edema, no cords/homans, warm/well-perfused  Respiratory: Normal respiratory rate/effort, no respiratory distress, normal voice, speaking full sentences, lungs clear to auscultation b/l, no wheezing/rales/rhonchi, no retractions, no stridor  Abdomen: Soft abdomen, no tender/distended/guarding/rebound, no CVA tender  Musculoskeletal: FROM all 4 extremities, N/V intact, no chico tender/deform  Neck: FROM neck, supple, no meningismus, trachea midline, no JVD  Integumentary: Color normal for race, warm and dry, no rash  Neuro: Alert/interactive, demented, CN 2-12 grossly intact, normal motor, normal sensory

## 2018-10-21 NOTE — ED PROVIDER NOTE - OBJECTIVE STATEMENT
85m w a hx of dementia, CAD, HLD, DM, & HTN sent in from [facility] for eval of episode of chest pain and hypoxia that has currently resolved. Pt reports having no memory of event. Pt reports feeling well at this time and denies any symptoms. Pt poor historian due to dementia. Daughter reports that patient recently admitted to hospital for UTI and currently in rehab. Pt denies any recent fever/chills, URI symptoms, cough, dyspnea, vomit/diarrhea, dysuria/hematuria, bleeding, or pain

## 2018-10-22 DIAGNOSIS — Z95.1 PRESENCE OF AORTOCORONARY BYPASS GRAFT: Chronic | ICD-10-CM

## 2018-10-22 DIAGNOSIS — I26.99 OTHER PULMONARY EMBOLISM WITHOUT ACUTE COR PULMONALE: ICD-10-CM

## 2018-10-22 DIAGNOSIS — R09.89 OTHER SPECIFIED SYMPTOMS AND SIGNS INVOLVING THE CIRCULATORY AND RESPIRATORY SYSTEMS: ICD-10-CM

## 2018-10-22 LAB
ALBUMIN SERPL ELPH-MCNC: 3.6 G/DL — SIGNIFICANT CHANGE UP (ref 3.5–5.2)
ALP SERPL-CCNC: 64 U/L — SIGNIFICANT CHANGE UP (ref 30–115)
ALT FLD-CCNC: 11 U/L — SIGNIFICANT CHANGE UP (ref 0–41)
ANION GAP SERPL CALC-SCNC: 15 MMOL/L — HIGH (ref 7–14)
APTT BLD: 31.7 SEC — SIGNIFICANT CHANGE UP (ref 27–39.2)
AST SERPL-CCNC: 16 U/L — SIGNIFICANT CHANGE UP (ref 0–41)
BACTERIA # UR AUTO: ABNORMAL /HPF
BILIRUB SERPL-MCNC: 0.5 MG/DL — SIGNIFICANT CHANGE UP (ref 0.2–1.2)
BUN SERPL-MCNC: 23 MG/DL — HIGH (ref 10–20)
CALCIUM SERPL-MCNC: 8.8 MG/DL — SIGNIFICANT CHANGE UP (ref 8.5–10.1)
CHLORIDE SERPL-SCNC: 99 MMOL/L — SIGNIFICANT CHANGE UP (ref 98–110)
CO2 SERPL-SCNC: 22 MMOL/L — SIGNIFICANT CHANGE UP (ref 17–32)
COD CRY URNS QL: ABNORMAL /HPF
CREAT SERPL-MCNC: 1.1 MG/DL — SIGNIFICANT CHANGE UP (ref 0.7–1.5)
D DIMER BLD IA.RAPID-MCNC: 3570 NG/ML DDU — HIGH (ref 0–230)
EPI CELLS # UR: ABNORMAL /HPF
GLUCOSE BLDC GLUCOMTR-MCNC: 126 MG/DL — HIGH (ref 70–99)
GLUCOSE BLDC GLUCOMTR-MCNC: 94 MG/DL — SIGNIFICANT CHANGE UP (ref 70–99)
GLUCOSE SERPL-MCNC: 106 MG/DL — HIGH (ref 70–99)
LIDOCAIN IGE QN: 11 U/L — SIGNIFICANT CHANGE UP (ref 7–60)
MAGNESIUM SERPL-MCNC: 2.2 MG/DL — SIGNIFICANT CHANGE UP (ref 1.8–2.4)
NT-PROBNP SERPL-SCNC: 1470 PG/ML — HIGH (ref 0–300)
POTASSIUM SERPL-MCNC: 3.8 MMOL/L — SIGNIFICANT CHANGE UP (ref 3.5–5)
POTASSIUM SERPL-SCNC: 3.8 MMOL/L — SIGNIFICANT CHANGE UP (ref 3.5–5)
PROT SERPL-MCNC: 5.9 G/DL — LOW (ref 6–8)
SODIUM SERPL-SCNC: 136 MMOL/L — SIGNIFICANT CHANGE UP (ref 135–146)
TROPONIN T SERPL-MCNC: 0.06 NG/ML — CRITICAL HIGH
TROPONIN T SERPL-MCNC: 0.07 NG/ML — CRITICAL HIGH
WBC UR QL: SIGNIFICANT CHANGE UP /HPF

## 2018-10-22 PROCEDURE — 93970 EXTREMITY STUDY: CPT | Mod: 26

## 2018-10-22 RX ORDER — GLUCAGON INJECTION, SOLUTION 0.5 MG/.1ML
1 INJECTION, SOLUTION SUBCUTANEOUS ONCE
Qty: 0 | Refills: 0 | Status: DISCONTINUED | OUTPATIENT
Start: 2018-10-22 | End: 2018-10-23

## 2018-10-22 RX ORDER — DEXTROSE 50 % IN WATER 50 %
15 SYRINGE (ML) INTRAVENOUS ONCE
Qty: 0 | Refills: 0 | Status: DISCONTINUED | OUTPATIENT
Start: 2018-10-22 | End: 2018-10-23

## 2018-10-22 RX ORDER — MEMANTINE HYDROCHLORIDE 10 MG/1
1 TABLET ORAL
Qty: 0 | Refills: 0 | COMMUNITY

## 2018-10-22 RX ORDER — ENOXAPARIN SODIUM 100 MG/ML
100 INJECTION SUBCUTANEOUS ONCE
Qty: 0 | Refills: 0 | Status: COMPLETED | OUTPATIENT
Start: 2018-10-22 | End: 2018-10-22

## 2018-10-22 RX ORDER — DOCUSATE SODIUM 100 MG
0 CAPSULE ORAL
Qty: 0 | Refills: 0 | COMMUNITY

## 2018-10-22 RX ORDER — OXYBUTYNIN CHLORIDE 5 MG
0 TABLET ORAL
Qty: 0 | Refills: 0 | COMMUNITY

## 2018-10-22 RX ORDER — DOCUSATE SODIUM 100 MG
1 CAPSULE ORAL
Qty: 0 | Refills: 0 | COMMUNITY

## 2018-10-22 RX ORDER — ENOXAPARIN SODIUM 100 MG/ML
100 INJECTION SUBCUTANEOUS EVERY 12 HOURS
Qty: 0 | Refills: 0 | Status: DISCONTINUED | OUTPATIENT
Start: 2018-10-22 | End: 2018-10-23

## 2018-10-22 RX ORDER — PANTOPRAZOLE SODIUM 20 MG/1
40 TABLET, DELAYED RELEASE ORAL
Qty: 0 | Refills: 0 | Status: DISCONTINUED | OUTPATIENT
Start: 2018-10-22 | End: 2018-10-23

## 2018-10-22 RX ORDER — OXYBUTYNIN CHLORIDE 5 MG
10 TABLET ORAL AT BEDTIME
Qty: 0 | Refills: 0 | Status: DISCONTINUED | OUTPATIENT
Start: 2018-10-22 | End: 2018-10-22

## 2018-10-22 RX ORDER — OXYBUTYNIN CHLORIDE 5 MG
5 TABLET ORAL
Qty: 0 | Refills: 0 | Status: DISCONTINUED | OUTPATIENT
Start: 2018-10-22 | End: 2018-10-23

## 2018-10-22 RX ORDER — INSULIN GLARGINE 100 [IU]/ML
21 INJECTION, SOLUTION SUBCUTANEOUS AT BEDTIME
Qty: 0 | Refills: 0 | Status: DISCONTINUED | OUTPATIENT
Start: 2018-10-22 | End: 2018-10-23

## 2018-10-22 RX ORDER — INSULIN LISPRO 100/ML
7 VIAL (ML) SUBCUTANEOUS
Qty: 0 | Refills: 0 | Status: DISCONTINUED | OUTPATIENT
Start: 2018-10-22 | End: 2018-10-23

## 2018-10-22 RX ORDER — PIOGLITAZONE HYDROCHLORIDE 15 MG/1
1 TABLET ORAL
Qty: 0 | Refills: 0 | COMMUNITY

## 2018-10-22 RX ORDER — DONEPEZIL HYDROCHLORIDE 10 MG/1
0 TABLET, FILM COATED ORAL
Qty: 0 | Refills: 0 | COMMUNITY

## 2018-10-22 RX ORDER — DEXTROSE 50 % IN WATER 50 %
12.5 SYRINGE (ML) INTRAVENOUS ONCE
Qty: 0 | Refills: 0 | Status: DISCONTINUED | OUTPATIENT
Start: 2018-10-22 | End: 2018-10-23

## 2018-10-22 RX ORDER — ASPIRIN/CALCIUM CARB/MAGNESIUM 324 MG
1 TABLET ORAL
Qty: 0 | Refills: 0 | COMMUNITY

## 2018-10-22 RX ORDER — SODIUM CHLORIDE 9 MG/ML
1000 INJECTION, SOLUTION INTRAVENOUS
Qty: 0 | Refills: 0 | Status: DISCONTINUED | OUTPATIENT
Start: 2018-10-22 | End: 2018-10-23

## 2018-10-22 RX ORDER — MEMANTINE HYDROCHLORIDE 10 MG/1
10 TABLET ORAL EVERY 12 HOURS
Qty: 0 | Refills: 0 | Status: DISCONTINUED | OUTPATIENT
Start: 2018-10-22 | End: 2018-10-23

## 2018-10-22 RX ORDER — DEXTROSE 50 % IN WATER 50 %
25 SYRINGE (ML) INTRAVENOUS ONCE
Qty: 0 | Refills: 0 | Status: DISCONTINUED | OUTPATIENT
Start: 2018-10-22 | End: 2018-10-23

## 2018-10-22 RX ORDER — INSULIN LISPRO 100/ML
VIAL (ML) SUBCUTANEOUS
Qty: 0 | Refills: 0 | Status: DISCONTINUED | OUTPATIENT
Start: 2018-10-22 | End: 2018-10-23

## 2018-10-22 RX ORDER — POLYETHYLENE GLYCOL 3350 17 G/17G
17 POWDER, FOR SOLUTION ORAL DAILY
Qty: 0 | Refills: 0 | Status: DISCONTINUED | OUTPATIENT
Start: 2018-10-22 | End: 2018-10-23

## 2018-10-22 RX ORDER — DONEPEZIL HYDROCHLORIDE 10 MG/1
10 TABLET, FILM COATED ORAL AT BEDTIME
Qty: 0 | Refills: 0 | Status: DISCONTINUED | OUTPATIENT
Start: 2018-10-22 | End: 2018-10-23

## 2018-10-22 RX ORDER — COLESEVELAM HYDROCHLORIDE 625 MG/1
3 TABLET, FILM COATED ORAL
Qty: 0 | Refills: 0 | COMMUNITY

## 2018-10-22 RX ORDER — ASPIRIN/CALCIUM CARB/MAGNESIUM 324 MG
325 TABLET ORAL DAILY
Qty: 0 | Refills: 0 | Status: DISCONTINUED | OUTPATIENT
Start: 2018-10-22 | End: 2018-10-23

## 2018-10-22 RX ORDER — DOCUSATE SODIUM 100 MG
100 CAPSULE ORAL
Qty: 0 | Refills: 0 | Status: DISCONTINUED | OUTPATIENT
Start: 2018-10-22 | End: 2018-10-23

## 2018-10-22 RX ADMIN — PANTOPRAZOLE SODIUM 40 MILLIGRAM(S): 20 TABLET, DELAYED RELEASE ORAL at 17:43

## 2018-10-22 RX ADMIN — Medication 5 MILLIGRAM(S): at 17:43

## 2018-10-22 RX ADMIN — ENOXAPARIN SODIUM 100 MILLIGRAM(S): 100 INJECTION SUBCUTANEOUS at 17:42

## 2018-10-22 RX ADMIN — MEMANTINE HYDROCHLORIDE 10 MILLIGRAM(S): 10 TABLET ORAL at 17:43

## 2018-10-22 RX ADMIN — Medication 325 MILLIGRAM(S): at 11:34

## 2018-10-22 RX ADMIN — DONEPEZIL HYDROCHLORIDE 10 MILLIGRAM(S): 10 TABLET, FILM COATED ORAL at 21:55

## 2018-10-22 RX ADMIN — ENOXAPARIN SODIUM 100 MILLIGRAM(S): 100 INJECTION SUBCUTANEOUS at 03:42

## 2018-10-22 RX ADMIN — Medication 100 MILLIGRAM(S): at 17:43

## 2018-10-22 NOTE — CONSULT NOTE ADULT - SUBJECTIVE AND OBJECTIVE BOX
Patient is a 85y old  Male who presents with a chief complaint of Chest pain and shortness of breath (22 Oct 2018 08:48)      HPI:  84yo man with PMH of dementia, CAD S/P CABG, HTN , DLD , DM II , recurrent UTI , paroxysmal Afib? not on AC , urinary incontinence is here for the above CC . As  per the NH , patient was having severe chest pain , and was gasping fpr air , oxygen checked at room air was 81% .  Upon presentation to ED , chest pain and dyspnea resolved , and patient doesn't recall the event  , currently denies any symptoms , only states that his legs " are bothering" but not able to elaborate more about it . Off note that he was recently discharged from [facility] for weakness and fever , treated presumptively for UTI , given a course of Ciprofloxacin that he finished on 10/18. (22 Oct 2018 08:48)      PAST MEDICAL & SURGICAL HISTORY:  Dementia  CAD (coronary artery disease)  High cholesterol  Hypertension  Diabetes  S/P CABG (coronary artery bypass graft)      SOCIAL HX:   Smoking                         ETOH                            Other    FAMILY HISTORY:  No pertinent family history in first degree relatives  .  No cardiovascular or pulmonary family history     Review of System:  See HPI    Allergies    Avandia (Unknown)  Lipitor (Unknown)  metformin (Unknown)    Intolerances          PHYSICAL EXAM  Vital Signs Last 24 Hrs  T(C): 36.2 (22 Oct 2018 08:15), Max: 36.8 (22 Oct 2018 01:23)  T(F): 97.1 (22 Oct 2018 08:15), Max: 98.2 (22 Oct 2018 01:23)  HR: 74 (22 Oct 2018 08:15) (70 - 87)  BP: 137/65 (22 Oct 2018 08:15) (131/68 - 148/72)  BP(mean): --  RR: 18 (22 Oct 2018 08:15) (18 - 20)  SpO2: 98% (22 Oct 2018 08:15) (96% - 99%)    General: In NAD  HEENT: MOY             Lymphatic system: No cervical LN     Lungs: Joe BS  Cardiovascular: Regular  Gastrointestinal: Soft.  + BS   Musculoskeletal: No Clubbing.  Full range of motion.. Moves all extremities  Skin: Warm.  Intact  Neurological: No motor or sensory deficit       LABS:                          10.6   10.18 )-----------( 270      ( 21 Oct 2018 23:00 )             32.9                                               10-21    136  |  99  |  23<H>  ----------------------------<  106<H>  3.8   |  22  |  1.1    Ca    8.8      21 Oct 2018 23:00  Mg     2.2     10-    TPro  5.9<L>  /  Alb  3.6  /  TBili  0.5  /  DBili  x   /  AST  16  /  ALT  11  /  AlkPhos  64  10-21      PT/INR - ( 21 Oct 2018 23:00 )   PT: 15.60 sec;   INR: 1.36 ratio         PTT - ( 21 Oct 2018 23:00 )  PTT:31.7 sec                                       Urinalysis Basic - ( 21 Oct 2018 23:30 )    Color: Yellow / Appearance: Clear / S.025 / pH: x  Gluc: x / Ketone: Trace  / Bili: Negative / Urobili: 0.2 mg/dL   Blood: x / Protein: Trace mg/dL / Nitrite: Negative   Leuk Esterase: Trace / RBC: x / WBC 1-2 /HPF   Sq Epi: x / Non Sq Epi: Occasional /HPF / Bacteria: Few /HPF        CARDIAC MARKERS ( 21 Oct 2018 23:00 )  x     / 0.07 ng/mL / x     / x     / x                                                LIVER FUNCTIONS - ( 21 Oct 2018 23:00 )  Alb: 3.6 g/dL / Pro: 5.9 g/dL / ALK PHOS: 64 U/L / ALT: 11 U/L / AST: 16 U/L / GGT: x                                              Blood Gas Profile - Venous (10.21.18 @ 23:48)    pH, Venous: 7.48    pCO2, Venous: 34 mmHg    pO2, Venous: 48 mmHg    HCO3, Venous: 26 mmoL/L    Base Excess, Venous: 2.5 mmoL/L    Oxygen Saturation, Venous: 86 %    Serum Pro-Brain Natriuretic Peptide: 1470 pg/mL (10.21.18 @ 23:00)    < from: CT Angio Chest w/ IV Cont (10.22.18 @ 02:11) >  FINDINGS:    PULMONARY EMBOLUS: Acute pulmonary embolus within the branch point of the   right middle and lower lobar brancheswith extension into right lower and   middle lobe segmental branches (series 7 images 150-173). Acute pulmonary   embolus in a left lower lobe segmental to subsegmental arterial branch   (series 7 images 195-208). No evidence of pulmonary FCI    LUNG/PLEURA/AIRWAYS: No focal consolidation, pleural effusion or   pneumothorax. Bibasilar subsegmental and linear atelectasis. Right   diaphragmatic calcified pleural plaque.    HEART/PERICARDIUM: Cardiomegaly. Coronary artery and aortic   calcifications. Sternotomy and CABG. No CT evidence of right heart strain.    MEDIASTINUM/THORACIC NODES: No enlarged adenopathy.    VISUALIZED UPPER ABDOMEN: Small hiatal hernia. Cholecystectomy with   stable associated mild CBD dilatation. Moderate colonic stool load.    BONES/SOFT TISSUES: Osteopenia with degenerative changes in the spine.      IMPRESSION:        1.  Acute bilateral lobar and segmental pulmonary emboli as described   involving arterial branches to the right middle and lower lobe and left   lower lobe.    2.  No CT evidence of right heart strain.      Dr. Vernon Chun discussed preliminary findings with DAVID SHELTON MD on 10/22/2018 2:42 AM with readback.    < end of copied text >                                                    MEDICATIONS  (STANDING):  aspirin 325 milliGRAM(s) Oral daily  dextrose 5%. 1000 milliLiter(s) (50 mL/Hr) IV Continuous <Continuous>  dextrose 50% Injectable 12.5 Gram(s) IV Push once  dextrose 50% Injectable 25 Gram(s) IV Push once  dextrose 50% Injectable 25 Gram(s) IV Push once  docusate sodium 100 milliGRAM(s) Oral two times a day  donepezil 10 milliGRAM(s) Oral at bedtime  enoxaparin Injectable 100 milliGRAM(s) SubCutaneous every 12 hours  insulin glargine Injectable (LANTUS) 21 Unit(s) SubCutaneous at bedtime  insulin lispro (HumaLOG) corrective regimen sliding scale   SubCutaneous three times a day before meals  insulin lispro Injectable (HumaLOG) 7 Unit(s) SubCutaneous before breakfast  insulin lispro Injectable (HumaLOG) 7 Unit(s) SubCutaneous before lunch  insulin lispro Injectable (HumaLOG) 7 Unit(s) SubCutaneous before dinner  memantine 10 milliGRAM(s) Oral every 12 hours  oxybutynin 10 milliGRAM(s) Oral at bedtime  pantoprazole    Tablet 40 milliGRAM(s) Oral before breakfast    MEDICATIONS  (PRN):  dextrose 40% Gel 15 Gram(s) Oral once PRN Blood Glucose LESS THAN 70 milliGRAM(s)/deciliter  glucagon  Injectable 1 milliGRAM(s) IntraMuscular once PRN Glucose LESS THAN 70 milligrams/deciliter  polyethylene glycol 3350 17 Gram(s) Oral daily PRN Constipation Patient is a 85y old  Male who presents with a chief complaint of Chest pain and shortness of breath (22 Oct 2018 08:48)      HPI:    86yo man with PMH of dementia, CAD S/P CABG, HTN , DLD , DM II , recurrent UTI , paroxysmal Afib? not on AC , urinary incontinence is here for the above CC . As  per the NH , patient was having severe chest pain , and was gasping fpr air , oxygen checked at room air was 81% .  Upon presentation to ED , chest pain and dyspnea resolved , and patient doesn't recall the event  , currently denies any symptoms , only states that his legs " are bothering" but not able to elaborate more about it . Off note that he was recently discharged from [facility] for weakness and fever , treated presumptively for UTI , given a course of Ciprofloxacin that he finished on 10/18. (22 Oct 2018 08:48)    Patient says he was sent in because he didnt feel well, he said he had some breathing difficulty and chest pains but does not elebaorate further. He says his symptoms are now resolved. No fever, chills, night sweats, weight loss, leg pain.      PAST MEDICAL & SURGICAL HISTORY:  Dementia  CAD (coronary artery disease)  High cholesterol  Hypertension  Diabetes  S/P CABG (coronary artery bypass graft)      SOCIAL HX:   Smoking   no ETOH  no                         FAMILY HISTORY:  No pertinent family history in first degree relatives  .  No cardiovascular or pulmonary family history     Review of System:  See HPI    Allergies    Avandia (Unknown)  Lipitor (Unknown)  metformin (Unknown)    Intolerances    PHYSICAL EXAM  Vital Signs Last 24 Hrs  T(C): 36.2 (22 Oct 2018 08:15), Max: 36.8 (22 Oct 2018 01:23)  T(F): 97.1 (22 Oct 2018 08:15), Max: 98.2 (22 Oct 2018 01:23)  HR: 74 (22 Oct 2018 08:15) (70 - 87)  BP: 137/65 (22 Oct 2018 08:15) (131/68 - 148/72)  BP(mean): --  RR: 18 (22 Oct 2018 08:15) (18 - 20)  SpO2: 98% (22 Oct 2018 08:15) (96% - 99%) room air    General: In NAD  HEENT: MOY             Lymphatic system: No cervical LN     Lungs: Joe BS, CTA b/l on anterior exam  Cardiovascular: Regular RR no murmurs  Gastrointestinal: Soft.  + BS NT  Musculoskeletal: No Clubbing.  Full range of motion.. Moves all extremities 2+ pitting edema bilaterally  Skin: Warm.  Intact  Neurological: No motor or sensory deficit       LABS:                          10.6   10.18 )-----------( 270      ( 21 Oct 2018 23:00 )             32.9                                               10    136  |  99  |  23<H>  ----------------------------<  106<H>  3.8   |  22  |  1.1    Ca    8.8      21 Oct 2018 23:00  Mg     2.2     10-21    TPro  5.9<L>  /  Alb  3.6  /  TBili  0.5  /  DBili  x   /  AST  16  /  ALT  11  /  AlkPhos  64  10      PT/INR - ( 21 Oct 2018 23:00 )   PT: 15.60 sec;   INR: 1.36 ratio         PTT - ( 21 Oct 2018 23:00 )  PTT:31.7 sec                                       Urinalysis Basic - ( 21 Oct 2018 23:30 )    Color: Yellow / Appearance: Clear / S.025 / pH: x  Gluc: x / Ketone: Trace  / Bili: Negative / Urobili: 0.2 mg/dL   Blood: x / Protein: Trace mg/dL / Nitrite: Negative   Leuk Esterase: Trace / RBC: x / WBC 1-2 /HPF   Sq Epi: x / Non Sq Epi: Occasional /HPF / Bacteria: Few /HPF        CARDIAC MARKERS ( 21 Oct 2018 23:00 )  x     / 0.07 ng/mL / x     / x     / x                                                LIVER FUNCTIONS - ( 21 Oct 2018 23:00 )  Alb: 3.6 g/dL / Pro: 5.9 g/dL / ALK PHOS: 64 U/L / ALT: 11 U/L / AST: 16 U/L / GGT: x                                              Blood Gas Profile - Venous (10.21.18 @ 23:48)    pH, Venous: 7.48    pCO2, Venous: 34 mmHg    pO2, Venous: 48 mmHg    HCO3, Venous: 26 mmoL/L    Base Excess, Venous: 2.5 mmoL/L    Oxygen Saturation, Venous: 86 %    Serum Pro-Brain Natriuretic Peptide: 1470 pg/mL (10.21.18 @ 23:00)    < from: CT Angio Chest w/ IV Cont (10.22.18 @ 02:11) >  FINDINGS:    PULMONARY EMBOLUS: Acute pulmonary embolus within the branch point of the   right middle and lower lobar brancheswith extension into right lower and   middle lobe segmental branches (series 7 images 150-173). Acute pulmonary   embolus in a left lower lobe segmental to subsegmental arterial branch   (series 7 images 195-208). No evidence of pulmonary care home    LUNG/PLEURA/AIRWAYS: No focal consolidation, pleural effusion or   pneumothorax. Bibasilar subsegmental and linear atelectasis. Right   diaphragmatic calcified pleural plaque.    HEART/PERICARDIUM: Cardiomegaly. Coronary artery and aortic   calcifications. Sternotomy and CABG. No CT evidence of right heart strain.    MEDIASTINUM/THORACIC NODES: No enlarged adenopathy.    VISUALIZED UPPER ABDOMEN: Small hiatal hernia. Cholecystectomy with   stable associated mild CBD dilatation. Moderate colonic stool load.    BONES/SOFT TISSUES: Osteopenia with degenerative changes in the spine.      IMPRESSION:        1.  Acute bilateral lobar and segmental pulmonary emboli as described   involving arterial branches to the right middle and lower lobe and left   lower lobe.    2.  No CT evidence of right heart strain.      Dr. Vernon Chun discussed preliminary findings with DAVID SHELTON MD on 10/22/2018 2:42 AM with readback.    < end of copied text >                                                    MEDICATIONS  (STANDING):  aspirin 325 milliGRAM(s) Oral daily  dextrose 5%. 1000 milliLiter(s) (50 mL/Hr) IV Continuous <Continuous>  dextrose 50% Injectable 12.5 Gram(s) IV Push once  dextrose 50% Injectable 25 Gram(s) IV Push once  dextrose 50% Injectable 25 Gram(s) IV Push once  docusate sodium 100 milliGRAM(s) Oral two times a day  donepezil 10 milliGRAM(s) Oral at bedtime  enoxaparin Injectable 100 milliGRAM(s) SubCutaneous every 12 hours  insulin glargine Injectable (LANTUS) 21 Unit(s) SubCutaneous at bedtime  insulin lispro (HumaLOG) corrective regimen sliding scale   SubCutaneous three times a day before meals  insulin lispro Injectable (HumaLOG) 7 Unit(s) SubCutaneous before breakfast  insulin lispro Injectable (HumaLOG) 7 Unit(s) SubCutaneous before lunch  insulin lispro Injectable (HumaLOG) 7 Unit(s) SubCutaneous before dinner  memantine 10 milliGRAM(s) Oral every 12 hours  oxybutynin 10 milliGRAM(s) Oral at bedtime  pantoprazole    Tablet 40 milliGRAM(s) Oral before breakfast    MEDICATIONS  (PRN):  dextrose 40% Gel 15 Gram(s) Oral once PRN Blood Glucose LESS THAN 70 milliGRAM(s)/deciliter  glucagon  Injectable 1 milliGRAM(s) IntraMuscular once PRN Glucose LESS THAN 70 milligrams/deciliter  polyethylene glycol 3350 17 Gram(s) Oral daily PRN Constipation Patient is a 85y old  Male who presents with a chief complaint of Chest pain and shortness of breath (22 Oct 2018 08:48)      HPI:    86yo man with PMH of dementia, CAD S/P CABG, HTN , DLD , DM II , recurrent UTI , paroxysmal Afib? not on AC , urinary incontinence is here for the above CC . As  per the NH , patient was having severe chest pain , and was gasping fpr air , oxygen checked at room air was 81% .  Upon presentation to ED , chest pain and dyspnea resolved , and patient doesn't recall the event  , currently denies any symptoms , only states that his legs " are bothering" but not able to elaborate more about it . Off note that he was recently discharged from [facility] for weakness and fever , treated presumptively for UTI , given a course of Ciprofloxacin that he finished on 10/18. (22 Oct 2018 08:48)    Patient says he was sent in because he didnt feel well, he said he had some breathing difficulty and chest pains but does not elebaorate further. He says his symptoms are now resolved. No fever, chills, night sweats, weight loss, leg pain. in ED D DIMER WAS HIGH HAD CT ANGIO PE      PAST MEDICAL & SURGICAL HISTORY:  Dementia  CAD (coronary artery disease)  High cholesterol  Hypertension  Diabetes  S/P CABG (coronary artery bypass graft)      SOCIAL HX:   Smoking   no ETOH  no                         FAMILY HISTORY:  No pertinent family history in first degree relatives  .  No cardiovascular or pulmonary family history     Review of System:  See HPI    Allergies    Avandia (Unknown)  Lipitor (Unknown)  metformin (Unknown)    Intolerances    PHYSICAL EXAM  Vital Signs Last 24 Hrs  T(C): 36.2 (22 Oct 2018 08:15), Max: 36.8 (22 Oct 2018 01:23)  T(F): 97.1 (22 Oct 2018 08:15), Max: 98.2 (22 Oct 2018 01:23)  HR: 74 (22 Oct 2018 08:15) (70 - 87)  BP: 137/65 (22 Oct 2018 08:15) (131/68 - 148/72  RR: 18 (22 Oct 2018 08:15) (18 - 20)  SpO2: 98% (22 Oct 2018 08:15) (96% - 99%) room air    General: In NAD  HEENT: MOY             Lymphatic system: No cervical LN     Lungs: Joe BS, CTA b/l on anterior exam  Cardiovascular: Regular RR no murmurs  Gastrointestinal: Soft.  + BS NT  Musculoskeletal: No Clubbing.  Full range of motion.. Moves all extremities 2+ pitting edema bilaterally  Skin: Warm.  Intact  Neurological: No motor or sensory deficit       LABS:                          10.6   10.18 )-----------( 270      ( 21 Oct 2018 23:00 )             32.9                                               10    136  |  99  |  23<H>  ----------------------------<  106<H>  3.8   |  22  |  1.1    Ca    8.8      21 Oct 2018 23:00  Mg     2.2     10-21    TPro  5.9<L>  /  Alb  3.6  /  TBili  0.5  /  DBili  x   /  AST  16  /  ALT  11  /  AlkPhos  64  10-      PT/INR - ( 21 Oct 2018 23:00 )   PT: 15.60 sec;   INR: 1.36 ratio         PTT - ( 21 Oct 2018 23:00 )  PTT:31.7 sec                                       Urinalysis Basic - ( 21 Oct 2018 23:30 )    Color: Yellow / Appearance: Clear / S.025 / pH: x  Gluc: x / Ketone: Trace  / Bili: Negative / Urobili: 0.2 mg/dL   Blood: x / Protein: Trace mg/dL / Nitrite: Negative   Leuk Esterase: Trace / RBC: x / WBC 1-2 /HPF   Sq Epi: x / Non Sq Epi: Occasional /HPF / Bacteria: Few /HPF        CARDIAC MARKERS ( 21 Oct 2018 23:00 )  x     / 0.07 ng/mL / x     / x     / x                                                LIVER FUNCTIONS - ( 21 Oct 2018 23:00 )  Alb: 3.6 g/dL / Pro: 5.9 g/dL / ALK PHOS: 64 U/L / ALT: 11 U/L / AST: 16 U/L / GGT: x                                              Blood Gas Profile - Venous (10.21.18 @ 23:48)    pH, Venous: 7.48    pCO2, Venous: 34 mmHg    pO2, Venous: 48 mmHg    HCO3, Venous: 26 mmoL/L    Base Excess, Venous: 2.5 mmoL/L    Oxygen Saturation, Venous: 86 %    Serum Pro-Brain Natriuretic Peptide: 1470 pg/mL (10.21.18 @ 23:00)    < from: CT Angio Chest w/ IV Cont (10.22.18 @ 02:11) >  FINDINGS:    PULMONARY EMBOLUS: Acute pulmonary embolus within the branch point of the   right middle and lower lobar brancheswith extension into right lower and   middle lobe segmental branches (series 7 images 150-173). Acute pulmonary   embolus in a left lower lobe segmental to subsegmental arterial branch   (series 7 images 195-208). No evidence of pulmonary nursing home    LUNG/PLEURA/AIRWAYS: No focal consolidation, pleural effusion or   pneumothorax. Bibasilar subsegmental and linear atelectasis. Right   diaphragmatic calcified pleural plaque.    HEART/PERICARDIUM: Cardiomegaly. Coronary artery and aortic   calcifications. Sternotomy and CABG. No CT evidence of right heart strain.    MEDIASTINUM/THORACIC NODES: No enlarged adenopathy.    VISUALIZED UPPER ABDOMEN: Small hiatal hernia. Cholecystectomy with   stable associated mild CBD dilatation. Moderate colonic stool load.    BONES/SOFT TISSUES: Osteopenia with degenerative changes in the spine.      IMPRESSION:        1.  Acute bilateral lobar and segmental pulmonary emboli as described   involving arterial branches to the right middle and lower lobe and left   lower lobe.    2.  No CT evidence of right heart strain.      Dr. Vernon Chun discussed preliminary findings with DAVID SHELTON MD on 10/22/2018 2:42 AM with readback.    < end of copied text >                                                    MEDICATIONS  (STANDING):  aspirin 325 milliGRAM(s) Oral daily  dextrose 5%. 1000 milliLiter(s) (50 mL/Hr) IV Continuous <Continuous>  dextrose 50% Injectable 12.5 Gram(s) IV Push once  dextrose 50% Injectable 25 Gram(s) IV Push once  dextrose 50% Injectable 25 Gram(s) IV Push once  docusate sodium 100 milliGRAM(s) Oral two times a day  donepezil 10 milliGRAM(s) Oral at bedtime  enoxaparin Injectable 100 milliGRAM(s) SubCutaneous every 12 hours  insulin glargine Injectable (LANTUS) 21 Unit(s) SubCutaneous at bedtime  insulin lispro (HumaLOG) corrective regimen sliding scale   SubCutaneous three times a day before meals  insulin lispro Injectable (HumaLOG) 7 Unit(s) SubCutaneous before breakfast  insulin lispro Injectable (HumaLOG) 7 Unit(s) SubCutaneous before lunch  insulin lispro Injectable (HumaLOG) 7 Unit(s) SubCutaneous before dinner  memantine 10 milliGRAM(s) Oral every 12 hours  oxybutynin 10 milliGRAM(s) Oral at bedtime  pantoprazole    Tablet 40 milliGRAM(s) Oral before breakfast    MEDICATIONS  (PRN):  dextrose 40% Gel 15 Gram(s) Oral once PRN Blood Glucose LESS THAN 70 milliGRAM(s)/deciliter  glucagon  Injectable 1 milliGRAM(s) IntraMuscular once PRN Glucose LESS THAN 70 milligrams/deciliter  polyethylene glycol 3350 17 Gram(s) Oral daily PRN Constipation

## 2018-10-22 NOTE — H&P ADULT - ATTENDING COMMENTS
84 yo man with PMH of dementia, CAD S/P CABG, HTN , DLD , DM II , recurrent UTI , paroxysmal Afib? not on AC , urinary incontinence is here for chest pain and SOB , found to have acute pulmonary embolism     1. Acute low risk bilateral segmental PE     No evidence of RV strain on CT chest     Not requiring oxygen     Troponin slightly elevated    Start Lovenox for now, NOAC on D/C    Pulmonary eval      f/u LE Duplex  2D echo  Trend trops    2. DM II     Hold metformin , pioglitazone , and glipizide     Start Insulin     3. CAD S/P CABG     Consider switching Asp 325 mg to 81 mg ?     Keep on Welchol (intolerance to Lipitor?)    Consider adding Beta blockers if no contraindication    #DVT px: On full dose anticoagulation   #GI px: Protonix PO  #Advanced directives: Discussed with his daughter , full code for now

## 2018-10-22 NOTE — H&P ADULT - NSHPLABSRESULTS_GEN_ALL_CORE
10.6   10.18 )-----------( 270      ( 21 Oct 2018 23:00 )             32.9       10-    136  |  99  |  23<H>  ----------------------------<  106<H>  3.8   |  22  |  1.1    Ca    8.8      21 Oct 2018 23:00  Mg     2.2     10-    TPro  5.9<L>  /  Alb  3.6  /  TBili  0.5  /  DBili  x   /  AST  16  /  ALT  11  /  AlkPhos  64  10-              Urinalysis Basic - ( 21 Oct 2018 23:30 )    Color: Yellow / Appearance: Clear / S.025 / pH: x  Gluc: x / Ketone: Trace  / Bili: Negative / Urobili: 0.2 mg/dL   Blood: x / Protein: Trace mg/dL / Nitrite: Negative   Leuk Esterase: Trace / RBC: x / WBC 1-2 /HPF   Sq Epi: x / Non Sq Epi: Occasional /HPF / Bacteria: Few /HPF        PT/INR - ( 21 Oct 2018 23:00 )   PT: 15.60 sec;   INR: 1.36 ratio         PTT - ( 21 Oct 2018 23:00 )  PTT:31.7 sec    Lactate Trend      CARDIAC MARKERS ( 21 Oct 2018 23:00 )  x     / 0.07 ng/mL / x     / x     / x          < from: CT Angio Chest w/ IV Cont (10.22.18 @ 02:11) >    1.  Acute bilateral lobar and segmental pulmonary emboli as described   involving arterial branches to the right middle and lower lobe and left   lower lobe.    2.  No CT evidence of right heart strain.      < end of copied text >

## 2018-10-22 NOTE — H&P ADULT - HISTORY OF PRESENT ILLNESS
86yo man with PMH of dementia, CAD S/P CABG, HTN , DLD , DM II , recurrent UTI , paroxysmal Afib? not on AC , urinary incontinence is here for the above CC . As  per the NH , patient was having severe chest pain , and was gasping fpr air , oxygen checked at room air was 81% .  Upon presentation to ED , chest pain and dyspnea resolved , and patient doesn't recall the event  , currently denies any symptoms , only states that his legs " are bothering" but not able to elaborate more about it . Off note that he was recently discharged from [facility] for weakness and fever , treated presumptively for UTI , given a course of Ciprofloxacin that he finished on 10/18.

## 2018-10-22 NOTE — H&P ADULT - ASSESSMENT
86yo man with PMH of dementia, CAD S/P CABG, HTN , DLD , DM II , recurrent UTI , paroxysmal Afib? not on AC , urinary incontinence is here for chest pain and SOB , found to have acute pulmonary embolism     1)Acute low risk bilateral segmental PE     No evidence of RV strain on CT chest     Not requiring oxygen     Troponin slightly elevated      -->Anticoagulation with Lovenox for now, NOAC on D/C  -->Pulmonary evaluation   -->LE Duplex  -->Trend trops    2)DM II     Hold metformin , pioglitazone , and glipizide     Start Insulin     3)CAD S/P CABG     Consider switching Asp 325 mg to 81 mg ?     Keep on Werchol (intolerance to Lipitor?)    Consider adding Beta blockers if no contraindication    #DVT px: On full dose anticoagulation   #GI px: Protonix PO  #Advanced directives: Discussed with his daughter , full code for now 84yo man with PMH of dementia, CAD S/P CABG, HTN , DLD , DM II , recurrent UTI , paroxysmal Afib? not on AC , urinary incontinence is here for chest pain and SOB , found to have acute pulmonary embolism     1)Acute low risk bilateral segmental PE     No evidence of RV strain on CT chest     Not requiring oxygen     Troponin slightly elevated      -->Anticoagulation with Lovenox for now, NOAC on D/C  -->Pulmonary evaluation   -->LE Duplex  -->Trend trops    2)DM II     Hold metformin , pioglitazone , and glipizide     Start Insulin     3)CAD S/P CABG     Consider switching Asp 325 mg to 81 mg ?     Keep on Welchol (intolerance to Lipitor?)    Consider adding Beta blockers if no contraindication    #DVT px: On full dose anticoagulation   #GI px: Protonix PO  #Advanced directives: Discussed with his daughter , full code for now 84 yo man with PMH of dementia, CAD S/P CABG, HTN , DLD , DM II , recurrent UTI , paroxysmal Afib? not on AC , urinary incontinence is here for chest pain and SOB , found to have acute pulmonary embolism     1)Acute low risk bilateral segmental PE     No evidence of RV strain on CT chest     Not requiring oxygen     Troponin slightly elevated      -->Anticoagulation with Lovenox for now, NOAC on D/C  -->Pulmonary evaluation   -->LE Duplex  -->2D echo  -->Trend trops    2)DM II     Hold metformin , pioglitazone , and glipizide     Start Insulin     3)CAD S/P CABG     Consider switching Asp 325 mg to 81 mg ?     Keep on Welchol (intolerance to Lipitor?)    Consider adding Beta blockers if no contraindication    #DVT px: On full dose anticoagulation   #GI px: Protonix PO  #Advanced directives: Discussed with his daughter , full code for now

## 2018-10-22 NOTE — CONSULT NOTE ADULT - ASSESSMENT
IMPRESSION:      PLAN: IMPRESSION:    Acute bilateral lobar and segmental PE - ?unprovoked  H/O Dementia  H/O Paroxysmal Afib not anticoagulated    PLAN:    - C/w lovenox for now  - F/u 2d echo, LE duplex  - on D/C can change to Eliquis 10mg q12h x 7 days then 5mg q12h  - OP pulmonary follow up IMPRESSION:    Acute bilateral lobar and segmental PE - provoked  H/O Dementia / chronically bedbound  H/O Paroxysmal Afib not anticoagulated    PLAN:    - D/c lovenox, change to Eliquis 10mg q12h x 7 days then 5mg q12h  - OP pulmonary follow up IMPRESSION:    Acute bilateral lobar and segmental P  H/O Dementia  H/O Paroxysmal Afib not anticoagulated    PLAN:    - D/c lovenox, Ccan start eliquis if no contraindication  - OP pulmonary follow up  - dc planning

## 2018-10-22 NOTE — H&P ADULT - NSHPPHYSICALEXAM_GEN_ALL_CORE
T(C): 36.2 (10-22-18 @ 08:15), Max: 36.8 (10-22-18 @ 01:23)  HR: 74 (10-22-18 @ 08:15) (70 - 87)  BP: 137/65 (10-22-18 @ 08:15) (131/68 - 148/72)  RR: 18 (10-22-18 @ 08:15) (18 - 20)  SpO2: 98% (10-22-18 @ 08:15) (96% - 99%)    PHYSICAL EXAM:  GENERAL: NAD, well-developed  NECK: Supple, No JVD  CHEST/LUNG: Clear to auscultation bilaterally; No wheeze  HEART: Regular rate and rhythm; No murmurs, rubs, or gallops  ABDOMEN: Soft, Nontender, Nondistended; Bowel sounds present  EXTREMITIES:  2+ Peripheral Pulses, No clubbing, cyanosis, 2+ non pitting edema up to ankles

## 2018-10-23 ENCOUNTER — TRANSCRIPTION ENCOUNTER (OUTPATIENT)
Age: 83
End: 2018-10-23

## 2018-10-23 VITALS
RESPIRATION RATE: 18 BRPM | SYSTOLIC BLOOD PRESSURE: 128 MMHG | TEMPERATURE: 99 F | OXYGEN SATURATION: 98 % | HEART RATE: 75 BPM | DIASTOLIC BLOOD PRESSURE: 79 MMHG

## 2018-10-23 PROBLEM — F03.90 UNSPECIFIED DEMENTIA WITHOUT BEHAVIORAL DISTURBANCE: Chronic | Status: INACTIVE | Noted: 2018-04-18 | Resolved: 2018-10-22

## 2018-10-23 LAB
ANION GAP SERPL CALC-SCNC: 12 MMOL/L — SIGNIFICANT CHANGE UP (ref 7–14)
APPEARANCE UR: CLEAR — SIGNIFICANT CHANGE UP
BASOPHILS # BLD AUTO: 0.06 K/UL — SIGNIFICANT CHANGE UP (ref 0–0.2)
BASOPHILS NFR BLD AUTO: 1 % — SIGNIFICANT CHANGE UP (ref 0–1)
BILIRUB UR-MCNC: NEGATIVE — SIGNIFICANT CHANGE UP
BUN SERPL-MCNC: 18 MG/DL — SIGNIFICANT CHANGE UP (ref 10–20)
CALCIUM SERPL-MCNC: 8.4 MG/DL — LOW (ref 8.5–10.1)
CHLORIDE SERPL-SCNC: 104 MMOL/L — SIGNIFICANT CHANGE UP (ref 98–110)
CO2 SERPL-SCNC: 25 MMOL/L — SIGNIFICANT CHANGE UP (ref 17–32)
COD CRY URNS QL: ABNORMAL /HPF
COLOR SPEC: YELLOW — SIGNIFICANT CHANGE UP
CREAT SERPL-MCNC: 1 MG/DL — SIGNIFICANT CHANGE UP (ref 0.7–1.5)
DIFF PNL FLD: NEGATIVE — SIGNIFICANT CHANGE UP
EOSINOPHIL # BLD AUTO: 0.06 K/UL — SIGNIFICANT CHANGE UP (ref 0–0.7)
EOSINOPHIL NFR BLD AUTO: 1 % — SIGNIFICANT CHANGE UP (ref 0–8)
GLUCOSE BLDC GLUCOMTR-MCNC: 102 MG/DL — HIGH (ref 70–99)
GLUCOSE BLDC GLUCOMTR-MCNC: 160 MG/DL — HIGH (ref 70–99)
GLUCOSE SERPL-MCNC: 101 MG/DL — HIGH (ref 70–99)
GLUCOSE UR QL: NEGATIVE — SIGNIFICANT CHANGE UP
HCT VFR BLD CALC: 32.6 % — LOW (ref 42–52)
HGB BLD-MCNC: 10.3 G/DL — LOW (ref 14–18)
IMM GRANULOCYTES NFR BLD AUTO: 0.3 % — SIGNIFICANT CHANGE UP (ref 0.1–0.3)
KETONES UR-MCNC: 15
LEUKOCYTE ESTERASE UR-ACNC: ABNORMAL
LYMPHOCYTES # BLD AUTO: 1.34 K/UL — SIGNIFICANT CHANGE UP (ref 1.2–3.4)
LYMPHOCYTES # BLD AUTO: 22.3 % — SIGNIFICANT CHANGE UP (ref 20.5–51.1)
MCHC RBC-ENTMCNC: 27 PG — SIGNIFICANT CHANGE UP (ref 27–31)
MCHC RBC-ENTMCNC: 31.6 G/DL — LOW (ref 32–37)
MCV RBC AUTO: 85.3 FL — SIGNIFICANT CHANGE UP (ref 80–94)
MONOCYTES # BLD AUTO: 0.38 K/UL — SIGNIFICANT CHANGE UP (ref 0.1–0.6)
MONOCYTES NFR BLD AUTO: 6.3 % — SIGNIFICANT CHANGE UP (ref 1.7–9.3)
NEUTROPHILS # BLD AUTO: 4.16 K/UL — SIGNIFICANT CHANGE UP (ref 1.4–6.5)
NEUTROPHILS NFR BLD AUTO: 69.1 % — SIGNIFICANT CHANGE UP (ref 42.2–75.2)
NITRITE UR-MCNC: NEGATIVE — SIGNIFICANT CHANGE UP
PH UR: 5.5 — SIGNIFICANT CHANGE UP (ref 5–8)
PLATELET # BLD AUTO: 274 K/UL — SIGNIFICANT CHANGE UP (ref 130–400)
POTASSIUM SERPL-MCNC: 4 MMOL/L — SIGNIFICANT CHANGE UP (ref 3.5–5)
POTASSIUM SERPL-SCNC: 4 MMOL/L — SIGNIFICANT CHANGE UP (ref 3.5–5)
PROT UR-MCNC: NEGATIVE — SIGNIFICANT CHANGE UP
RBC # BLD: 3.82 M/UL — LOW (ref 4.7–6.1)
RBC # FLD: 14.6 % — HIGH (ref 11.5–14.5)
SODIUM SERPL-SCNC: 141 MMOL/L — SIGNIFICANT CHANGE UP (ref 135–146)
SP GR SPEC: >=1.03 — SIGNIFICANT CHANGE UP (ref 1.01–1.03)
TROPONIN T SERPL-MCNC: 0.05 NG/ML — CRITICAL HIGH
UROBILINOGEN FLD QL: 0.2 — SIGNIFICANT CHANGE UP (ref 0.2–0.2)
WBC # BLD: 6.02 K/UL — SIGNIFICANT CHANGE UP (ref 4.8–10.8)
WBC # FLD AUTO: 6.02 K/UL — SIGNIFICANT CHANGE UP (ref 4.8–10.8)
WBC UR QL: SIGNIFICANT CHANGE UP /HPF

## 2018-10-23 RX ORDER — APIXABAN 2.5 MG/1
1 TABLET, FILM COATED ORAL
Qty: 70 | Refills: 0 | OUTPATIENT
Start: 2018-10-23 | End: 2018-11-21

## 2018-10-23 RX ORDER — APIXABAN 2.5 MG/1
10 TABLET, FILM COATED ORAL EVERY 12 HOURS
Qty: 0 | Refills: 0 | Status: DISCONTINUED | OUTPATIENT
Start: 2018-10-23 | End: 2018-10-23

## 2018-10-23 RX ADMIN — INSULIN GLARGINE 21 UNIT(S): 100 INJECTION, SOLUTION SUBCUTANEOUS at 00:38

## 2018-10-23 RX ADMIN — Medication 5 MILLIGRAM(S): at 05:39

## 2018-10-23 RX ADMIN — PANTOPRAZOLE SODIUM 40 MILLIGRAM(S): 20 TABLET, DELAYED RELEASE ORAL at 09:43

## 2018-10-23 RX ADMIN — Medication 100 MILLIGRAM(S): at 05:39

## 2018-10-23 RX ADMIN — MEMANTINE HYDROCHLORIDE 10 MILLIGRAM(S): 10 TABLET ORAL at 05:39

## 2018-10-23 RX ADMIN — Medication 7 UNIT(S): at 09:14

## 2018-10-23 RX ADMIN — ENOXAPARIN SODIUM 100 MILLIGRAM(S): 100 INJECTION SUBCUTANEOUS at 05:39

## 2018-10-23 NOTE — DISCHARGE NOTE ADULT - CARE PROVIDER_API CALL
Bill Colunga), Critical Care Medicine; Internal Medicine; Pulmonary Disease; Sleep Medicine  [address] [place] [address]  Phone: [phone]  Fax: [phone]

## 2018-10-23 NOTE — DISCHARGE NOTE ADULT - HOSPITAL COURSE
84 yo man with PMH of dementia, CAD S/P CABG, HTN , DLD , DM II , recurrent UTI , paroxysmal Afib? not on AC , urinary incontinence is here for the above CC . As  per the NH , patient was having severe chest pain , and was gasping fpr air , oxygen checked at room air was 81% .  Upon presentation to ED , chest pain and dyspnea resolved , and patient doesn't recall the event  , currently denies any symptoms , only states that his legs " are bothering" but not able to elaborate more about it . Off note that he was recently discharged from [facility] for weakness and fever , treated presumptively for UTI , given a course of Ciprofloxacin that he finished on 10/18.  in ED D DIMER WAS HIGH HAD CT ANGIO showed PE, no R heart strain on ct or echo. Duplex of LE was negative. Pt was initially on Lovenox and switched over to eliquis after being seen by the pulmonologist.

## 2018-10-23 NOTE — DISCHARGE NOTE ADULT - PLAN OF CARE
Maintain Clinical Stability Take Eliquis as Prescribed. 10mg Twice a day for 7 days then 5mg twice a day thereafter.  Follow up with the pulmonologist.

## 2018-10-23 NOTE — DISCHARGE NOTE ADULT - PATIENT PORTAL LINK FT
You can access the [facility] Patient Portal, offered by [facility], by registering with the following website: [email or web address]

## 2018-10-23 NOTE — PROGRESS NOTE ADULT - SUBJECTIVE AND OBJECTIVE BOX
DIONNE CURIEL  85y  Male    Patient is a 85y old  Male who presents with a chief complaint of Chest pain and shortness of breath (23 Oct 2018 12:24)      INTERVAL HPI/OVERNIGHT EVENTS: none    T(C): 36.8 (10-23-18 @ 07:52), Max: 36.9 (10-22-18 @ 21:55)  HR: 89 (10-23-18 @ 07:52) (76 - 89)  BP: 127/59 (10-23-18 @ 07:52) (102/66 - 149/82)  RR: 18 (10-23-18 @ 07:52) (17 - 18)  SpO2: 99% (10-23-18 @ 07:52) (96% - 100%)  Wt(kg): --Vital Signs Last 24 Hrs  T(C): 36.8 (23 Oct 2018 07:52), Max: 36.9 (22 Oct 2018 21:55)  T(F): 98.2 (23 Oct 2018 07:52), Max: 98.4 (22 Oct 2018 21:55)  HR: 89 (23 Oct 2018 07:52) (76 - 89)  BP: 127/59 (23 Oct 2018 07:52) (102/66 - 149/82)  BP(mean): --  RR: 18 (23 Oct 2018 07:52) (17 - 18)  SpO2: 99% (23 Oct 2018 07:52) (96% - 100%)    PHYSICAL EXAM:  GENERAL: NAD, well-groomed, well-developed  HEAD:  Atraumatic, Normocephalic  NECK: Supple, No JVD, Normal thyroid  NERVOUS SYSTEM:  Alert & Oriented X3, Good concentration; Motor Strength 5/5 B/L upper and lower extremities; DTRs 2+ intact and symmetric  CHEST/LUNG: Clear to percussion bilaterally; No rales, rhonchi, wheezing, or rubs  HEART: Regular rate and rhythm; No murmurs, rubs, or gallops  ABDOMEN: Soft, Nontender, Nondistended; Bowel sounds present  EXTREMITIES:  2+ Peripheral Pulses, No clubbing, cyanosis, or edema    Consultant(s) Notes Reviewed:  [x ] YES  [ ] NO    Discussed with Consultants/Other Providers/Residents [ x] YES     LABS                         10.3   6.02  )-----------( 274      ( 23 Oct 2018 08:03 )             32.6     10-23    141  |  104  |  18  ----------------------------<  101<H>  4.0   |  25  |  1.0    Ca    8.4<L>      23 Oct 2018 08:03  Mg     2.2     10-21    TPro  5.9<L>  /  Alb  3.6  /  TBili  0.5  /  DBili  x   /  AST  16  /  ALT  11  /  AlkPhos  64  10-21    PT/INR - ( 21 Oct 2018 23:00 )   PT: 15.60 sec;   INR: 1.36 ratio         PTT - ( 21 Oct 2018 23:00 )  PTT:31.7 sec      LIVER FUNCTIONS - ( 21 Oct 2018 23:00 )  Alb: 3.6 g/dL / Pro: 5.9 g/dL / ALK PHOS: 64 U/L / ALT: 11 U/L / AST: 16 U/L / GGT: x           CAPILLARY BLOOD GLUCOSE  160 (23 Oct 2018 11:08)      POCT Blood Glucose.: 160 mg/dL (23 Oct 2018 11:05)    Urinalysis Basic - ( 23 Oct 2018 11:58 )    Color: Yellow / Appearance: Clear / SG: >=1.030 / pH: x  Gluc: x / Ketone: 15  / Bili: Negative / Urobili: 0.2   Blood: x / Protein: Negative / Nitrite: Negative   Leuk Esterase: Small / RBC: x / WBC x   Sq Epi: x / Non Sq Epi: x / Bacteria: x        RADIOLOGY & ADDITIONAL TESTS:    Imaging Personally Reviewed:  [x ] YES  [ ] NO    MEDICATIONS  (STANDING):  apixaban 10 milliGRAM(s) Oral every 12 hours  aspirin 325 milliGRAM(s) Oral daily  dextrose 5%. 1000 milliLiter(s) (50 mL/Hr) IV Continuous <Continuous>  dextrose 50% Injectable 12.5 Gram(s) IV Push once  dextrose 50% Injectable 25 Gram(s) IV Push once  dextrose 50% Injectable 25 Gram(s) IV Push once  docusate sodium 100 milliGRAM(s) Oral two times a day  donepezil 10 milliGRAM(s) Oral at bedtime  insulin glargine Injectable (LANTUS) 21 Unit(s) SubCutaneous at bedtime  insulin lispro (HumaLOG) corrective regimen sliding scale   SubCutaneous three times a day before meals  insulin lispro Injectable (HumaLOG) 7 Unit(s) SubCutaneous before breakfast  insulin lispro Injectable (HumaLOG) 7 Unit(s) SubCutaneous before lunch  insulin lispro Injectable (HumaLOG) 7 Unit(s) SubCutaneous before dinner  memantine 10 milliGRAM(s) Oral every 12 hours  oxybutynin 5 milliGRAM(s) Oral two times a day  pantoprazole    Tablet 40 milliGRAM(s) Oral before breakfast    MEDICATIONS  (PRN):  dextrose 40% Gel 15 Gram(s) Oral once PRN Blood Glucose LESS THAN 70 milliGRAM(s)/deciliter  glucagon  Injectable 1 milliGRAM(s) IntraMuscular once PRN Glucose LESS THAN 70 milligrams/deciliter  polyethylene glycol 3350 17 Gram(s) Oral daily PRN Constipation      HEALTH ISSUES - PROBLEM Dx:  Pulmonary thromboembolism  Suspected deep vein thrombosis

## 2018-10-23 NOTE — DISCHARGE NOTE ADULT - CARE PLAN
Principal Discharge DX:	Pulmonary embolism  Goal:	Maintain Clinical Stability  Assessment and plan of treatment:	Take Eliquis as Prescribed. 10mg Twice a day for 7 days then 5mg twice a day thereafter.  Follow up with the pulmonologist.

## 2018-10-23 NOTE — ED ADULT NURSE REASSESSMENT NOTE - INTERVENTIONS DEFINITIONS
[place] to call system/Stretcher in lowest position, wheels locked, appropriate side rails in place/Provide visual cue, wrist band, yellow gown, etc./Non-slip footwear when patient is off stretcher/Physically safe environment: no spills, clutter or unnecessary equipment/Call bell, personal items and telephone within reach

## 2018-10-23 NOTE — ED ADULT NURSE REASSESSMENT NOTE - NS ED NURSE REASSESS COMMENT FT1
1145: report received from KIMI Barbosa.
Patient is alert, currently sleeping . Denied any chest pain . Not in any acute distress . Waiting for CT chest .
pt transported to Nursing home via EMS.
patient reassessed, observed resting in bed. no indication of pain or discomfort. VS reassessed routinely. plan of care reviewed with patient.

## 2018-10-23 NOTE — DISCHARGE NOTE ADULT - MEDICATION SUMMARY - MEDICATIONS TO TAKE
I will START or STAY ON the medications listed below when I get home from the hospital:    aspirin 325 mg oral tablet  -- 1 tab(s) by mouth once a day  -- Indication: For CAD (coronary artery disease)    Eliquis Starter Pack 5 mg oral tablet  -- 10mg twice a day x 7 days then  5mg twice a day  -- Check with your doctor before becoming pregnant.  It is very important that you take or use this exactly as directed.  Do not skip doses or discontinue unless directed by your doctor.  Obtain medical advice before taking any non-prescription drugs as some may affect the action of this medication.    -- Indication: For PULMONARY EMBOLISM    pioglitazone 45 mg oral tablet  -- 1 tab(s) by mouth once a day  -- Indication: For DM2    glipiZIDE 10 mg oral tablet, extended release  -- 2 tab(s) by mouth 2 times a day  -- Indication: For DM2    Welchol 625 mg oral tablet  -- 3 tab(s) by mouth 2 times a day  -- Indication: For DLD    donepezil 10 mg oral tablet  -- orally once a day  -- Indication: For Dementia    Colace 100 mg oral capsule  -- 1 cap(s) by mouth 2 times a day  -- Indication: For Constipation    memantine 10 mg oral tablet  -- 1 tab(s) by mouth 2 times a day  -- Indication: For Dementia    oxybutynin 10 mg/24 hr oral tablet, extended release  -- orally once a day (at bedtime)  -- Indication: For Incontinence

## 2018-10-23 NOTE — PROGRESS NOTE ADULT - ATTENDING COMMENTS
84 yo man with PMH of dementia, CAD S/P CABG, HTN , DLD , DM II , recurrent UTI , paroxysmal Afib? not on AC , urinary incontinence is here for chest pain and SOB , found to have acute pulmonary embolism     1. Acute low risk bilateral segmental PE     No evidence of RV strain on CT chest     Not requiring oxygen     Troponin slightly elevated    Agree with Eliquis    Pulmonary eval appreciated: ??     f/u LE Duplex  2D echo  Trend trops    2. DM II     Hold metformin , pioglitazone , and glipizide     Start Insulin     3. CAD S/P CABG     Consider switching Asp 325 mg to 81 mg ?     Keep on Welchol (intolerance to Lipitor?)    Consider adding Beta blockers if no contraindication    #DVT px: On full dose anticoagulation   #GI px: Protonix PO  #Advanced directives: Discussed with his daughter , full code for now

## 2018-10-24 ENCOUNTER — OUTPATIENT (OUTPATIENT)
Dept: OUTPATIENT SERVICES | Facility: HOSPITAL | Age: 83
LOS: 1 days | Discharge: HOME | End: 2018-10-24

## 2018-10-24 DIAGNOSIS — E10.9 TYPE 1 DIABETES MELLITUS WITHOUT COMPLICATIONS: ICD-10-CM

## 2018-10-24 DIAGNOSIS — Z95.1 PRESENCE OF AORTOCORONARY BYPASS GRAFT: Chronic | ICD-10-CM

## 2018-10-24 DIAGNOSIS — D64.9 ANEMIA, UNSPECIFIED: ICD-10-CM

## 2018-10-24 PROBLEM — I25.10 ATHEROSCLEROTIC HEART DISEASE OF NATIVE CORONARY ARTERY WITHOUT ANGINA PECTORIS: Chronic | Status: ACTIVE | Noted: 2018-10-21

## 2018-10-24 PROBLEM — F03.90 UNSPECIFIED DEMENTIA WITHOUT BEHAVIORAL DISTURBANCE: Chronic | Status: ACTIVE | Noted: 2018-10-21

## 2018-10-24 LAB
GLUCOSE BLDC GLUCOMTR-MCNC: 117 MG/DL — HIGH (ref 70–99)
GLUCOSE BLDC GLUCOMTR-MCNC: 83 MG/DL — SIGNIFICANT CHANGE UP (ref 70–99)

## 2018-10-29 ENCOUNTER — OUTPATIENT (OUTPATIENT)
Dept: OUTPATIENT SERVICES | Facility: HOSPITAL | Age: 83
LOS: 1 days | Discharge: HOME | End: 2018-10-29

## 2018-10-29 DIAGNOSIS — Z95.1 PRESENCE OF AORTOCORONARY BYPASS GRAFT: Chronic | ICD-10-CM

## 2018-10-30 DIAGNOSIS — R79.9 ABNORMAL FINDING OF BLOOD CHEMISTRY, UNSPECIFIED: ICD-10-CM

## 2018-11-14 ENCOUNTER — EMERGENCY (EMERGENCY)
Facility: HOSPITAL | Age: 83
LOS: 0 days | Discharge: NURSING, REHAB OR LONG-TERM CARE | End: 2018-11-14
Attending: EMERGENCY MEDICINE | Admitting: EMERGENCY MEDICINE

## 2018-11-14 VITALS
WEIGHT: 184.97 LBS | TEMPERATURE: 98 F | OXYGEN SATURATION: 96 % | SYSTOLIC BLOOD PRESSURE: 156 MMHG | RESPIRATION RATE: 18 BRPM | DIASTOLIC BLOOD PRESSURE: 98 MMHG | HEART RATE: 84 BPM

## 2018-11-14 DIAGNOSIS — Z79.01 LONG TERM (CURRENT) USE OF ANTICOAGULANTS: ICD-10-CM

## 2018-11-14 DIAGNOSIS — Z79.82 LONG TERM (CURRENT) USE OF ASPIRIN: ICD-10-CM

## 2018-11-14 DIAGNOSIS — Z79.899 OTHER LONG TERM (CURRENT) DRUG THERAPY: ICD-10-CM

## 2018-11-14 DIAGNOSIS — I10 ESSENTIAL (PRIMARY) HYPERTENSION: ICD-10-CM

## 2018-11-14 DIAGNOSIS — Z95.1 PRESENCE OF AORTOCORONARY BYPASS GRAFT: Chronic | ICD-10-CM

## 2018-11-14 DIAGNOSIS — F03.90 UNSPECIFIED DEMENTIA WITHOUT BEHAVIORAL DISTURBANCE: ICD-10-CM

## 2018-11-14 DIAGNOSIS — R00.0 TACHYCARDIA, UNSPECIFIED: ICD-10-CM

## 2018-11-14 DIAGNOSIS — Z95.1 PRESENCE OF AORTOCORONARY BYPASS GRAFT: ICD-10-CM

## 2018-11-14 DIAGNOSIS — Z88.8 ALLERGY STATUS TO OTHER DRUGS, MEDICAMENTS AND BIOLOGICAL SUBSTANCES: ICD-10-CM

## 2018-11-14 DIAGNOSIS — E78.00 PURE HYPERCHOLESTEROLEMIA, UNSPECIFIED: ICD-10-CM

## 2018-11-14 DIAGNOSIS — G91.9 HYDROCEPHALUS, UNSPECIFIED: ICD-10-CM

## 2018-11-14 DIAGNOSIS — E11.9 TYPE 2 DIABETES MELLITUS WITHOUT COMPLICATIONS: ICD-10-CM

## 2018-11-14 DIAGNOSIS — I25.10 ATHEROSCLEROTIC HEART DISEASE OF NATIVE CORONARY ARTERY WITHOUT ANGINA PECTORIS: ICD-10-CM

## 2018-11-14 LAB
ALBUMIN SERPL ELPH-MCNC: 3.9 G/DL — SIGNIFICANT CHANGE UP (ref 3.5–5.2)
ALP SERPL-CCNC: 76 U/L — SIGNIFICANT CHANGE UP (ref 30–115)
ALT FLD-CCNC: 79 U/L — HIGH (ref 0–41)
ANION GAP SERPL CALC-SCNC: 11 MMOL/L — SIGNIFICANT CHANGE UP (ref 7–14)
APPEARANCE UR: CLEAR — SIGNIFICANT CHANGE UP
AST SERPL-CCNC: 57 U/L — HIGH (ref 0–41)
BACTERIA # UR AUTO: ABNORMAL
BASOPHILS # BLD AUTO: 0.02 K/UL — SIGNIFICANT CHANGE UP (ref 0–0.2)
BASOPHILS NFR BLD AUTO: 0.3 % — SIGNIFICANT CHANGE UP (ref 0–1)
BILIRUB SERPL-MCNC: 0.7 MG/DL — SIGNIFICANT CHANGE UP (ref 0.2–1.2)
BILIRUB UR-MCNC: ABNORMAL
BUN SERPL-MCNC: 26 MG/DL — HIGH (ref 10–20)
CALCIUM SERPL-MCNC: 9.6 MG/DL — SIGNIFICANT CHANGE UP (ref 8.5–10.1)
CHLORIDE SERPL-SCNC: 104 MMOL/L — SIGNIFICANT CHANGE UP (ref 98–110)
CO2 SERPL-SCNC: 27 MMOL/L — SIGNIFICANT CHANGE UP (ref 17–32)
COLOR SPEC: YELLOW — SIGNIFICANT CHANGE UP
CREAT SERPL-MCNC: 1 MG/DL — SIGNIFICANT CHANGE UP (ref 0.7–1.5)
DIFF PNL FLD: ABNORMAL
EOSINOPHIL # BLD AUTO: 0 K/UL — SIGNIFICANT CHANGE UP (ref 0–0.7)
EOSINOPHIL NFR BLD AUTO: 0 % — SIGNIFICANT CHANGE UP (ref 0–8)
EPI CELLS # UR: ABNORMAL /HPF
GLUCOSE SERPL-MCNC: 163 MG/DL — HIGH (ref 70–99)
GLUCOSE UR QL: NEGATIVE MG/DL — SIGNIFICANT CHANGE UP
HCT VFR BLD CALC: 40 % — LOW (ref 42–52)
HGB BLD-MCNC: 12.6 G/DL — LOW (ref 14–18)
IMM GRANULOCYTES NFR BLD AUTO: 0.5 % — HIGH (ref 0.1–0.3)
KETONES UR-MCNC: 40
LEUKOCYTE ESTERASE UR-ACNC: NEGATIVE — SIGNIFICANT CHANGE UP
LYMPHOCYTES # BLD AUTO: 1.14 K/UL — LOW (ref 1.2–3.4)
LYMPHOCYTES # BLD AUTO: 15.1 % — LOW (ref 20.5–51.1)
MCHC RBC-ENTMCNC: 26.4 PG — LOW (ref 27–31)
MCHC RBC-ENTMCNC: 31.5 G/DL — LOW (ref 32–37)
MCV RBC AUTO: 83.7 FL — SIGNIFICANT CHANGE UP (ref 80–94)
MONOCYTES # BLD AUTO: 0.42 K/UL — SIGNIFICANT CHANGE UP (ref 0.1–0.6)
MONOCYTES NFR BLD AUTO: 5.6 % — SIGNIFICANT CHANGE UP (ref 1.7–9.3)
NEUTROPHILS # BLD AUTO: 5.92 K/UL — SIGNIFICANT CHANGE UP (ref 1.4–6.5)
NEUTROPHILS NFR BLD AUTO: 78.5 % — HIGH (ref 42.2–75.2)
NITRITE UR-MCNC: NEGATIVE — SIGNIFICANT CHANGE UP
NRBC # BLD: 0 /100 WBCS — SIGNIFICANT CHANGE UP (ref 0–0)
PH UR: 5.5 — SIGNIFICANT CHANGE UP (ref 5–8)
PLATELET # BLD AUTO: 297 K/UL — SIGNIFICANT CHANGE UP (ref 130–400)
POTASSIUM SERPL-MCNC: 4.7 MMOL/L — SIGNIFICANT CHANGE UP (ref 3.5–5)
POTASSIUM SERPL-SCNC: 4.7 MMOL/L — SIGNIFICANT CHANGE UP (ref 3.5–5)
PROT SERPL-MCNC: 6.5 G/DL — SIGNIFICANT CHANGE UP (ref 6–8)
PROT UR-MCNC: 30 MG/DL
RBC # BLD: 4.78 M/UL — SIGNIFICANT CHANGE UP (ref 4.7–6.1)
RBC # FLD: 14.3 % — SIGNIFICANT CHANGE UP (ref 11.5–14.5)
RBC CASTS # UR COMP ASSIST: ABNORMAL /HPF
SODIUM SERPL-SCNC: 142 MMOL/L — SIGNIFICANT CHANGE UP (ref 135–146)
SP GR SPEC: >=1.03 (ref 1.01–1.03)
TROPONIN T SERPL-MCNC: 0.04 NG/ML — CRITICAL HIGH
UROBILINOGEN FLD QL: 0.2 MG/DL — SIGNIFICANT CHANGE UP (ref 0.2–0.2)
WBC # BLD: 7.54 K/UL — SIGNIFICANT CHANGE UP (ref 4.8–10.8)
WBC # FLD AUTO: 7.54 K/UL — SIGNIFICANT CHANGE UP (ref 4.8–10.8)
WBC UR QL: SIGNIFICANT CHANGE UP /HPF

## 2018-11-14 RX ORDER — SODIUM CHLORIDE 9 MG/ML
1000 INJECTION INTRAMUSCULAR; INTRAVENOUS; SUBCUTANEOUS ONCE
Qty: 0 | Refills: 0 | Status: COMPLETED | OUTPATIENT
Start: 2018-11-14 | End: 2018-11-14

## 2018-11-14 RX ADMIN — SODIUM CHLORIDE 1000 MILLILITER(S): 9 INJECTION INTRAMUSCULAR; INTRAVENOUS; SUBCUTANEOUS at 13:57

## 2018-11-14 NOTE — ED PROVIDER NOTE - PHYSICAL EXAMINATION
VITAL SIGNS: I have reviewed nursing notes and confirm.  CONSTITUTIONAL: Well-developed; well-nourished; in no acute distress.   SKIN: Agree with RN documentation regarding decubitus evaluation. Remainder of skin exam is warm and dry, no acute rash.    HEAD: Normocephalic; atraumatic.  EYES: conjunctiva and sclera clear.  ENT: No nasal discharge; airway clear.  CARD: S1, S2 normal; no murmurs, gallops, or rubs. Regular rate and rhythm.   RESP: No wheezes, rales or rhonchi.  ABD: Normal bowel sounds; soft; non-distended; non-tender  EXT: Normal ROM.  No clubbing, cyanosis or edema.   NEURO: Alert, oriented, grossly unremarkable  PSYCH: Cooperative, appropriate.

## 2018-11-14 NOTE — ED ADULT NURSE NOTE - NSIMPLEMENTINTERV_GEN_ALL_ED
Implemented All Fall with Harm Risk Interventions:  [place] to call system. Call bell, personal items and telephone within reach. Instruct patient to call for assistance. Room bathroom lighting operational. Non-slip footwear when patient is off stretcher. Physically safe environment: no spills, clutter or unnecessary equipment. Stretcher in lowest position, wheels locked, appropriate side rails in place. Provide visual cue, wrist band, yellow gown, etc. Monitor gait and stability. Monitor for mental status changes and reorient to person, place, and time. Review medications for side effects contributing to fall risk. Reinforce activity limits and safety measures with patient and family. Provide visual clues: red socks.

## 2018-11-14 NOTE — ED PROVIDER NOTE - MEDICAL DECISION MAKING DETAILS
I personally evaluated the patient. I reviewed the Resident’s or Physician Assistant’s note (as assigned above), and agree with the findings and plan except as documented in my note. 86yo M presenting with increased AMS and irregular heart beat. now improving, no fevers, no chills, no CP, no dysuria. no vomiting, diarrhea or abdominal pain. PE: pt appars confused. daughterstates he has improved at baseline. moving all 4 extremities, able to follow commands. plan CT, labs and evaluate I personally evaluated the patient. I reviewed the Resident’s or Physician Assistant’s note (as assigned above), and agree with the findings and plan except as documented in my note. 86yo M presenting with increased AMS and irregular heart beat. now improving, no fevers, no chills, no CP, no dysuria. no vomiting, diarrhea or abdominal pain. PE: pt appars confused ,daughter states he has improved at baseline currently, he is nc/at perrla eomi oropharynx clear cta b/l, rrr s1s2 noted abd-soft nt nd bs +, ext from with no edema moving all 4 extremities, able to follow commands. plan CT, labs and evaluate we obtained ua as well as labs I reviewed prior visits at this time

## 2018-11-14 NOTE — ED PROVIDER NOTE - CARE PROVIDER_API CALL
Juan Carlos Merrill), EEGEpilepsy; Neurology  [facility]  [address] [place] [address]  Phone: [phone]  Fax: [phone]

## 2018-11-14 NOTE — ED PROVIDER NOTE - ATTENDING CONTRIBUTION TO CARE
I personally evaluated the patient. I reviewed the Resident’s or Physician Assistant’s note (as assigned above), and agree with the findings and plan except as documented in my note. 86yo M presenting with increased AMS and irregular heart beat. now improving, no fevers, no chills, no CP, no dysuria. no vomiting, diarrhea or abdominal pain. PE: pt appars confused ,daughter states he has improved at baseline currently, he is nc/at perrla eomi oropharynx clear cta b/l, rrr s1s2 noted abd-soft nt nd bs +, ext from with no edema moving all 4 extremities, able to follow commands. plan CT, labs and evaluate we obtained ua as well as labs I reviewed prior visits at this time

## 2018-11-14 NOTE — ED PROVIDER NOTE - PROGRESS NOTE DETAILS
had discussion with pt's daughter about hydrocephalus and need for outpatient management with neurology, pt;s daughter is amendable to plan, pt given results to take back to facility. Pt had no episodes of afib while in ED

## 2018-11-14 NOTE — ED PROVIDER NOTE - OBJECTIVE STATEMENT
Pt is a 86y/o male with a pmhx of dementia, DM, HTN, HLD, PE on anticoagulation presents today from Nursing home for eval of tachycardia witnessed at facility. Pt offers no complaints at this time, but is limited historian due to history of dementia.

## 2018-11-15 LAB
CULTURE RESULTS: SIGNIFICANT CHANGE UP
SPECIMEN SOURCE: SIGNIFICANT CHANGE UP

## 2018-11-27 ENCOUNTER — OUTPATIENT (OUTPATIENT)
Dept: OUTPATIENT SERVICES | Facility: HOSPITAL | Age: 83
LOS: 1 days | Discharge: HOME | End: 2018-11-27

## 2018-11-27 DIAGNOSIS — Z95.1 PRESENCE OF AORTOCORONARY BYPASS GRAFT: Chronic | ICD-10-CM

## 2018-11-27 DIAGNOSIS — R94.5 ABNORMAL RESULTS OF LIVER FUNCTION STUDIES: ICD-10-CM

## 2018-11-28 ENCOUNTER — OUTPATIENT (OUTPATIENT)
Dept: OUTPATIENT SERVICES | Facility: HOSPITAL | Age: 83
LOS: 1 days | Discharge: HOME | End: 2018-11-28

## 2018-11-28 DIAGNOSIS — D64.9 ANEMIA, UNSPECIFIED: ICD-10-CM

## 2018-11-28 DIAGNOSIS — Z95.1 PRESENCE OF AORTOCORONARY BYPASS GRAFT: Chronic | ICD-10-CM

## 2018-11-28 DIAGNOSIS — I50.9 HEART FAILURE, UNSPECIFIED: ICD-10-CM

## 2018-11-30 ENCOUNTER — OUTPATIENT (OUTPATIENT)
Dept: OUTPATIENT SERVICES | Facility: HOSPITAL | Age: 83
LOS: 1 days | Discharge: HOME | End: 2018-11-30

## 2018-11-30 DIAGNOSIS — R79.9 ABNORMAL FINDING OF BLOOD CHEMISTRY, UNSPECIFIED: ICD-10-CM

## 2018-11-30 DIAGNOSIS — Z95.1 PRESENCE OF AORTOCORONARY BYPASS GRAFT: Chronic | ICD-10-CM

## 2018-12-08 ENCOUNTER — OUTPATIENT (OUTPATIENT)
Dept: OUTPATIENT SERVICES | Facility: HOSPITAL | Age: 83
LOS: 1 days | Discharge: HOME | End: 2018-12-08

## 2018-12-08 DIAGNOSIS — A49.9 BACTERIAL INFECTION, UNSPECIFIED: ICD-10-CM

## 2018-12-08 DIAGNOSIS — R94.5 ABNORMAL RESULTS OF LIVER FUNCTION STUDIES: ICD-10-CM

## 2018-12-08 DIAGNOSIS — Z95.1 PRESENCE OF AORTOCORONARY BYPASS GRAFT: Chronic | ICD-10-CM

## 2019-07-09 NOTE — PATIENT PROFILE ADULT. - LIVES WITH, PROFILE
[facility] - 19 EGA - 34.6    0700 - Report received from BAUTISTA Shaikh RN. POC discussed, care assumed.   0740 -  Full Assessment complete. VSS. No complaints of contractions, ROM, or vaginal bleeding at this time. Pt reports good FM. Pt denies HA epigastric pain and visual changes. Pt states she has had mild swelling. POC discussed with pt , all questions answered.  External monitors applied at this time for 1 hour NST. Category 1 FHT at this time.  0801 - NST obtained. Monitors removed. Pt denies needs  1157 Pt sleeping at this time.  1510 FOB at bedside, pt denies needs.  1612 Pt denies needs states she had 2 UCs over the last couple hours but they were not painful.  1726 Betamethasone given, see MAR. Pt denies needs at this time.  1810 Dr. Estrada at bedside, POC discussed.   children

## 2020-02-21 NOTE — PHYSICAL THERAPY INITIAL EVALUATION ADULT - LEVEL OF INDEPENDENCE: SIT/STAND, REHAB EVAL
Vaccine Information Statement(s) was given today. This has been reviewed, questions answered, and verbal consent given by Patient for injection(s) and administration of Influenza (Inactivated).    1. Does the patient have a moderate to severe fever?  No  2. Has the patient had a serious reaction to a flu shot before?   No  3. Has the patient ever had Guillian Perham Syndrome within 6 weeks of a previous flu shot?  No  4. Is the patient less than 6 months of age?  No    Patient is eligible to receive the vaccine based on all questions being answered as 'No'.    Patient tolerated without incident. See immunization grid for documentation.     moderate assist (50% patients effort)

## 2020-03-07 NOTE — ED ADULT NURSE NOTE - ALCOHOL PRE SCREEN (AUDIT - C)
Patient was treated appropriately for UTI and pregnancy, however, will need treatment for gonorrhea as well as notification. Statement Selected

## 2020-05-01 NOTE — ED ADULT NURSE NOTE - IS THE PATIENT ABLE TO BE SCREENED?
Telephone visit:     Confirmed identification, patient agrees to a telephonic call. This visit was not recorded or stored.     Reason for telephone visit: Patient has a confirmed COVID-19 diagnosis     Start time: 2:07  End time: 2:19    Patient location: Home   Provider location: Clinic    Chief complaint:   120/80; p- 91; t-98.4;  Chief Complaint   Patient presents with   • Exposure Coronavirus (Covid-19)     need letter work [phone]   dx'd with covid-19 4/17/2020;temp 101.0- reolved after 3 days- some arthralgias for a few days;no cough/sob/no chest pain/no n/vom/diarrhea;no loss of sense of smell- - everything is back to normal now;has been not working since since 4/17/20;   Needs note for rtw-wants note faxed            No diagnosis found.    Plan:          Follow up recommendations:4-6 MONTHS        Farhan Duque MD               
Yes

## 2020-05-27 NOTE — ED PROVIDER NOTE - MEDICAL DECISION MAKING DETAILS
Stable.
85m w CAD and recent hospitalization sent in from rehab for eval of episode of chest pain and hypoxia. nontoxic appearing, no resp distress. asymptomatic in ED. Labs, EKG, & imaging reviewed. AC given for PE's. Patient admitted for further care and management.

## 2021-10-27 NOTE — DISCHARGE NOTE ADULT - CAREGIVER ADDRESS
Pt with h/o MS presents to the ED c/o R wrist pain which began 1 hour ago s/p mechanical fall. Pt was walking outside and tripped on loose side walk and fell onto outstretched R hand with subsequent wrist flexion per pt. Pain rated 9/10; no meds taken PTA. No head injury or LOC. No neck pain reported; no c-spine tenderness to palpation. No blood thinners. Pt was able to get off the ground with assistance. Pt arrives with wrist immobilizer in place. CMS intact distally; no deformity. No chest pain, palpitations, or dizziness preceding the fall or currently. Pt was feeling well prior to the fall. Pt A&Ox4.  
[address]  [place]

## 2024-07-10 NOTE — DISCHARGE NOTE ADULT - NS AS DC FOLLOWUP STROKE INST
Left message for patient to call the office back    Influenza vaccination (VIS Pub Date: August 7, 2015)

## 2024-11-08 NOTE — PHYSICAL THERAPY INITIAL EVALUATION ADULT - WEIGHT-BEARING RESTRICTIONS: STAND/SIT, REHAB EVAL
- Schedule an Echocardiogram soon. This is an ultrasound to evaluate the function and structure of your heart.    - Schedule a cardiac CT, this test will help to evaluate the arteries around your heart  Cardiac CT Instructions  Arrive 30 minutes prior to your scheduled appointment  Take Lopressor 100 mg once, 1-2 hours prior to your test  Nothing to eat or drink 4 hours prior  It is ok to take your prescribed medications with a sip of water  No caffeine or strenuous activity 24 hours prior  No smoking for 24 hours prior  Bring your ID and insurance card    - Increase your Crestor to 40 mg daily (you can take two of your 20 mg tablets to equal 40 mg until your prescription runs out)    - Have your labs drawn today on the 1st floor    - Ok to continue your regular exercise. Listen to your body if symptoms arise, stop or slow down.    Follow healthy diet, cooking at home as much as possible.  Cook with fresh fruits and vegetables, lean protein, and limit red meat  Avoid fast food and pre-packaged food  Shop on the outside aisles of the grocery store    Exercise regularly  Approximately 30 minutes/day, 5 times/week  Exercising is considered anything that makes you break a sweat      Please call the office [phone], or send a message through [employer] with any worsening symptoms, concerns or questions.        
full weight-bearing

## 2025-01-03 NOTE — ED PROVIDER NOTE - NS ED MD DISPO ADMITTING SERVICE
[FreeTextEntry3] : Patient seen with SHARIFA Wells. Plan for CT then DCCV for long-standing persistent atrial fibrillation. If he feels better once sinus rhythm is restored, I would recommend a catheter ablation vs. a hybrid ablation.  [Time Spent: ___ minutes] : I have spent [unfilled] minutes of time on the encounter which excludes teaching and separately reported services. MED

## 2025-02-10 NOTE — PHYSICAL THERAPY INITIAL EVALUATION ADULT - HEALTH SCREEN CRITERIA
yes [Antalgic] : antalgic [UE/LE] : Sensory: Intact in bilateral upper & lower extremities [Normal DTR Reflexes] : DTR reflexes normal [Normal Proprioception] : sensation intact for proprioception [Normal] : Oriented to person, place, and time, insight and judgement were intact and the affect was normal [de-identified] : 4 view lumbar spine x-ray PACS 2/10/2025 Minimal anterolisthesis L4-5 relative maintenance of coronal and sagittal alignment  4 view cervical spine x-ray 2/10/2025 PACS Lordosis maintained with degenerative changes in the facets at multiple levels.  Some disc base generation at multiple subaxial levels with no evidence of instability  MRI lumbar spine 1/3/2025 [facility] Central stenosis at L4-5 L3-4 and L2-3  MRI cervical spine 1/3/2025 Motion artifact on the sagittal sequences.  Multilevel degenerative change without obvious cord compression or signal change.

## 2025-04-16 NOTE — PROGRESS NOTE ADULT - PROBLEM SELECTOR PLAN 1
"Jai Shrestha is a 54 y.o. male on day 1 of admission presenting with Healthcare-associated pneumonia.    Subjective   No new complaint     Scheduled medications  Scheduled Medications[1]          Last Recorded Vitals  Blood pressure 133/72, pulse 93, temperature 36.6 °C (97.9 °F), temperature source Temporal, resp. rate 16, height 1.803 m (5' 11\"), weight (!) 186 kg (411 lb), SpO2 92%.  Intake/Output last 3 Shifts:  I/O last 3 completed shifts:  In: 1100 (5.9 mL/kg) [P.O.:1000; IV Piggyback:100]  Out: 1350 (7.2 mL/kg) [Urine:1350 (0.2 mL/kg/hr)]  Weight: 186.4 kg     Exam  General:  obese male in no distress      Relevant Results  Results from last 7 days   Lab Units 04/15/25  2040 04/15/25  1611 04/15/25  1153 04/15/25  1117 04/15/25  0729 04/15/25  0120 04/15/25  0006 04/14/25  1922   POCT GLUCOSE mg/dL 157* 137*  --  304* 176* 138*   < >  --    GLUCOSE mg/dL  --   --  288*  --   --   --   --  63*    < > = values in this interval not displayed.       Assessment & Plan  Healthcare-associated pneumonia    IMPRESSION  TYPE 2 DIABETES MELLITUS  LONG TERM CURRENT INSULIN USE  High and unusual insulin regimen  He typically requires less insulin in the hospital        RECOMMENDATIONS  Continue glargine 80 units BID  Lispro 30 units QAC plus scale        Ashwin Cornejo MD         [1] amLODIPine, 10 mg, oral, Daily  aspirin, 81 mg, oral, Daily  atorvastatin, 80 mg, oral, Daily  azithromycin, 500 mg, intravenous, Daily  DULoxetine, 60 mg, oral, Daily  enoxaparin, 60 mg, subcutaneous, q12h TAMIR  furosemide, 80 mg, intravenous, BID  insulin glargine, 80 Units, subcutaneous, q12h  insulin lispro, 0-10 Units, subcutaneous, TID  insulin lispro, 30 Units, subcutaneous, TID AC  pantoprazole, 40 mg, oral, Daily before breakfast   Or  pantoprazole, 40 mg, intravenous, Daily before breakfast  perflutren protein A microsphere, 0.5 mL, intravenous, Once in imaging  piperacillin-tazobactam, 3.375 g, intravenous, q6h  regadenoson, 0.4 " mg, intravenous, Once in imaging  sulfur hexafluoride microsphr, 2 mL, intravenous, Once in imaging     IV abx - change to vanco  ID consult  follow up cultures  antipyretics  valdivia removed  d/c IVF - fluid overload

## 2025-07-09 NOTE — PHYSICAL THERAPY INITIAL EVALUATION ADULT - PERSONAL SAFETY AND JUDGMENT, REHAB EVAL
Labs were preop cardiac cath today.  BMP was repeated on admission.   
at risk behaviors demonstrated